# Patient Record
Sex: FEMALE | Race: OTHER | HISPANIC OR LATINO | URBAN - METROPOLITAN AREA
[De-identification: names, ages, dates, MRNs, and addresses within clinical notes are randomized per-mention and may not be internally consistent; named-entity substitution may affect disease eponyms.]

---

## 2021-06-23 ENCOUNTER — INPATIENT (INPATIENT)
Facility: HOSPITAL | Age: 61
LOS: 1 days | Discharge: HOME | DRG: 813 | End: 2021-06-25
Payer: COMMERCIAL

## 2021-06-23 ENCOUNTER — RESULT REVIEW (OUTPATIENT)
Age: 61
End: 2021-06-23

## 2021-06-23 VITALS
HEART RATE: 85 BPM | SYSTOLIC BLOOD PRESSURE: 162 MMHG | WEIGHT: 182.98 LBS | OXYGEN SATURATION: 97 % | DIASTOLIC BLOOD PRESSURE: 94 MMHG | RESPIRATION RATE: 18 BRPM | HEIGHT: 63 IN

## 2021-06-23 DIAGNOSIS — R51.9 HEADACHE, UNSPECIFIED: ICD-10-CM

## 2021-06-23 DIAGNOSIS — E03.9 HYPOTHYROIDISM, UNSPECIFIED: ICD-10-CM

## 2021-06-23 DIAGNOSIS — E87.0 HYPEROSMOLALITY AND HYPERNATREMIA: ICD-10-CM

## 2021-06-23 DIAGNOSIS — E78.5 HYPERLIPIDEMIA, UNSPECIFIED: ICD-10-CM

## 2021-06-23 DIAGNOSIS — F32.9 MAJOR DEPRESSIVE DISORDER, SINGLE EPISODE, UNSPECIFIED: ICD-10-CM

## 2021-06-23 DIAGNOSIS — M19.90 UNSPECIFIED OSTEOARTHRITIS, UNSPECIFIED SITE: ICD-10-CM

## 2021-06-23 DIAGNOSIS — Z29.9 ENCOUNTER FOR PROPHYLACTIC MEASURES, UNSPECIFIED: ICD-10-CM

## 2021-06-23 DIAGNOSIS — R21 RASH AND OTHER NONSPECIFIC SKIN ERUPTION: ICD-10-CM

## 2021-06-23 DIAGNOSIS — D69.59 OTHER SECONDARY THROMBOCYTOPENIA: ICD-10-CM

## 2021-06-23 DIAGNOSIS — D69.6 THROMBOCYTOPENIA, UNSPECIFIED: ICD-10-CM

## 2021-06-23 LAB
ALBUMIN SERPL ELPH-MCNC: 4.4 G/DL — SIGNIFICANT CHANGE UP (ref 3.3–5)
ALP SERPL-CCNC: 97 U/L — SIGNIFICANT CHANGE UP (ref 40–120)
ALT FLD-CCNC: 21 U/L — SIGNIFICANT CHANGE UP (ref 10–45)
ANION GAP SERPL CALC-SCNC: 14 MMOL/L — SIGNIFICANT CHANGE UP (ref 5–17)
ANISOCYTOSIS BLD QL: SLIGHT — SIGNIFICANT CHANGE UP
APTT BLD: 31.5 SEC — SIGNIFICANT CHANGE UP (ref 27.5–35.5)
AST SERPL-CCNC: 32 U/L — SIGNIFICANT CHANGE UP (ref 10–40)
BASOPHILS # BLD AUTO: 0.14 K/UL — SIGNIFICANT CHANGE UP (ref 0–0.2)
BASOPHILS NFR BLD AUTO: 2.7 % — HIGH (ref 0–2)
BILIRUB SERPL-MCNC: 1.2 MG/DL — SIGNIFICANT CHANGE UP (ref 0.2–1.2)
BLD GP AB SCN SERPL QL: NEGATIVE — SIGNIFICANT CHANGE UP
BUN SERPL-MCNC: 17 MG/DL — SIGNIFICANT CHANGE UP (ref 7–23)
CALCIUM SERPL-MCNC: 9.3 MG/DL — SIGNIFICANT CHANGE UP (ref 8.4–10.5)
CHLORIDE SERPL-SCNC: 111 MMOL/L — HIGH (ref 96–108)
CO2 SERPL-SCNC: 25 MMOL/L — SIGNIFICANT CHANGE UP (ref 22–31)
CREAT SERPL-MCNC: 1.08 MG/DL — SIGNIFICANT CHANGE UP (ref 0.5–1.3)
DACRYOCYTES BLD QL SMEAR: SLIGHT — SIGNIFICANT CHANGE UP
EOSINOPHIL # BLD AUTO: 0.13 K/UL — SIGNIFICANT CHANGE UP (ref 0–0.5)
EOSINOPHIL NFR BLD AUTO: 2.6 % — SIGNIFICANT CHANGE UP (ref 0–6)
GLUCOSE SERPL-MCNC: 103 MG/DL — HIGH (ref 70–99)
HAV IGM SER-ACNC: SIGNIFICANT CHANGE UP
HBV CORE AB SER-ACNC: SIGNIFICANT CHANGE UP
HBV CORE IGM SER-ACNC: SIGNIFICANT CHANGE UP
HBV SURFACE AB SER-ACNC: SIGNIFICANT CHANGE UP
HBV SURFACE AG SER-ACNC: SIGNIFICANT CHANGE UP
HCT VFR BLD CALC: 43.3 % — SIGNIFICANT CHANGE UP (ref 34.5–45)
HCV AB S/CO SERPL IA: 0.04 S/CO — SIGNIFICANT CHANGE UP
HCV AB SERPL-IMP: SIGNIFICANT CHANGE UP
HGB BLD-MCNC: 14.5 G/DL — SIGNIFICANT CHANGE UP (ref 11.5–15.5)
HIV 1+2 AB+HIV1 P24 AG SERPL QL IA: SIGNIFICANT CHANGE UP
INR BLD: 1.12 — SIGNIFICANT CHANGE UP (ref 0.88–1.16)
LYMPHOCYTES # BLD AUTO: 2.53 K/UL — SIGNIFICANT CHANGE UP (ref 1–3.3)
LYMPHOCYTES # BLD AUTO: 48.7 % — HIGH (ref 13–44)
MANUAL SMEAR VERIFICATION: SIGNIFICANT CHANGE UP
MCHC RBC-ENTMCNC: 31.4 PG — SIGNIFICANT CHANGE UP (ref 27–34)
MCHC RBC-ENTMCNC: 33.5 GM/DL — SIGNIFICANT CHANGE UP (ref 32–36)
MCV RBC AUTO: 93.7 FL — SIGNIFICANT CHANGE UP (ref 80–100)
METAMYELOCYTES # FLD: 0.9 % — HIGH (ref 0–0)
MONOCYTES # BLD AUTO: 0.18 K/UL — SIGNIFICANT CHANGE UP (ref 0–0.9)
MONOCYTES NFR BLD AUTO: 3.5 % — SIGNIFICANT CHANGE UP (ref 2–14)
NEUTROPHILS # BLD AUTO: 2.16 K/UL — SIGNIFICANT CHANGE UP (ref 1.8–7.4)
NEUTROPHILS NFR BLD AUTO: 41.6 % — LOW (ref 43–77)
OVALOCYTES BLD QL SMEAR: SLIGHT — SIGNIFICANT CHANGE UP
PLAT MORPH BLD: NORMAL — SIGNIFICANT CHANGE UP
PLATELET # BLD AUTO: 2 K/UL — CRITICAL LOW (ref 150–400)
POIKILOCYTOSIS BLD QL AUTO: SLIGHT — SIGNIFICANT CHANGE UP
POLYCHROMASIA BLD QL SMEAR: SLIGHT — SIGNIFICANT CHANGE UP
POTASSIUM SERPL-MCNC: 4.4 MMOL/L — SIGNIFICANT CHANGE UP (ref 3.5–5.3)
POTASSIUM SERPL-SCNC: 4.4 MMOL/L — SIGNIFICANT CHANGE UP (ref 3.5–5.3)
PROT SERPL-MCNC: 7.8 G/DL — SIGNIFICANT CHANGE UP (ref 6–8.3)
PROTHROM AB SERPL-ACNC: 13.4 SEC — SIGNIFICANT CHANGE UP (ref 10.6–13.6)
RBC # BLD: 4.62 M/UL — SIGNIFICANT CHANGE UP (ref 3.8–5.2)
RBC # FLD: 11.8 % — SIGNIFICANT CHANGE UP (ref 10.3–14.5)
RBC BLD AUTO: ABNORMAL
RH IG SCN BLD-IMP: POSITIVE — SIGNIFICANT CHANGE UP
RH IG SCN BLD-IMP: POSITIVE — SIGNIFICANT CHANGE UP
SARS-COV-2 RNA SPEC QL NAA+PROBE: NEGATIVE — SIGNIFICANT CHANGE UP
SODIUM SERPL-SCNC: 150 MMOL/L — HIGH (ref 135–145)
SPHEROCYTES BLD QL SMEAR: SLIGHT — SIGNIFICANT CHANGE UP
T4 FREE SERPL-MCNC: 1.19 NG/DL — SIGNIFICANT CHANGE UP (ref 0.93–1.7)
TSH SERPL-MCNC: 24.75 UIU/ML — HIGH (ref 0.27–4.2)
WBC # BLD: 5.19 K/UL — SIGNIFICANT CHANGE UP (ref 3.8–10.5)
WBC # FLD AUTO: 5.19 K/UL — SIGNIFICANT CHANGE UP (ref 3.8–10.5)

## 2021-06-23 PROCEDURE — 99285 EMERGENCY DEPT VISIT HI MDM: CPT

## 2021-06-23 PROCEDURE — 70450 CT HEAD/BRAIN W/O DYE: CPT | Mod: 26

## 2021-06-23 PROCEDURE — 99223 1ST HOSP IP/OBS HIGH 75: CPT | Mod: GC

## 2021-06-23 RX ORDER — ACETAMINOPHEN 500 MG
650 TABLET ORAL ONCE
Refills: 0 | Status: COMPLETED | OUTPATIENT
Start: 2021-06-23 | End: 2021-06-23

## 2021-06-23 RX ORDER — LEVOTHYROXINE SODIUM 125 MCG
125 TABLET ORAL
Refills: 0 | Status: DISCONTINUED | OUTPATIENT
Start: 2021-06-24 | End: 2021-06-25

## 2021-06-23 RX ORDER — DIPHENHYDRAMINE HCL 50 MG
50 CAPSULE ORAL ONCE
Refills: 0 | Status: COMPLETED | OUTPATIENT
Start: 2021-06-23 | End: 2021-06-23

## 2021-06-23 RX ORDER — IMMUNE GLOBULIN (HUMAN) 10 G/100ML
65 INJECTION INTRAVENOUS; SUBCUTANEOUS ONCE
Refills: 0 | Status: COMPLETED | OUTPATIENT
Start: 2021-06-23 | End: 2021-06-23

## 2021-06-23 RX ORDER — DEXAMETHASONE 0.5 MG/5ML
40 ELIXIR ORAL ONCE
Refills: 0 | Status: COMPLETED | OUTPATIENT
Start: 2021-06-23 | End: 2021-06-23

## 2021-06-23 RX ORDER — LEVOTHYROXINE SODIUM 125 MCG
137 TABLET ORAL
Refills: 0 | Status: DISCONTINUED | OUTPATIENT
Start: 2021-06-23 | End: 2021-06-25

## 2021-06-23 RX ORDER — SERTRALINE 25 MG/1
50 TABLET, FILM COATED ORAL DAILY
Refills: 0 | Status: DISCONTINUED | OUTPATIENT
Start: 2021-06-24 | End: 2021-06-25

## 2021-06-23 RX ORDER — DEXAMETHASONE 0.5 MG/5ML
40 ELIXIR ORAL ONCE
Refills: 0 | Status: DISCONTINUED | OUTPATIENT
Start: 2021-06-23 | End: 2021-06-23

## 2021-06-23 RX ADMIN — Medication 50 MILLIGRAM(S): at 21:53

## 2021-06-23 RX ADMIN — IMMUNE GLOBULIN (HUMAN) 162.5 GRAM(S): 10 INJECTION INTRAVENOUS; SUBCUTANEOUS at 22:25

## 2021-06-23 RX ADMIN — Medication 108 MILLIGRAM(S): at 15:39

## 2021-06-23 RX ADMIN — Medication 650 MILLIGRAM(S): at 21:53

## 2021-06-23 RX ADMIN — Medication 650 MILLIGRAM(S): at 23:07

## 2021-06-23 NOTE — H&P ADULT - ASSESSMENT
60 y/o F w/PMH of HLD, Hypothyroid, Depression, arthritis, macular degeneration who presents to the ED due to having otupatient PLTS of 2 w/o known cause

## 2021-06-23 NOTE — H&P ADULT - PROBLEM SELECTOR PLAN 1
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 CBC gorssly nomal  - will f/u heme onc labs (HIV, HEP, ERIN, Lupus labs, RF, TSH, FT4, B12, Folate)  - f/u flow cytometry and peripheral smear  - f/u US abdomen  - c/w Decadron 40mg x4D; IVIG  - Advise patient in DC note to avoid naproxen  - Maintain active T+S, transfuse platelets if < 10K or < 20K if febrile or < 50K if clinically important bleeding

## 2021-06-23 NOTE — ED PROVIDER NOTE - CLINICAL SUMMARY MEDICAL DECISION MAKING FREE TEXT BOX
60 y/o F with abnormal platelet levels and bruising on her arms and legs. Will repeat labs and consult hematology.

## 2021-06-23 NOTE — H&P ADULT - PROBLEM SELECTOR PLAN 2
- Long standing arthritis, thought to be OA  - given ITP with morning stiffness and chronic fatigue small concern for rheum disease so will w/u  - f/u RF, Lupus labs  - f.u. x-ray left hand given erythema in area

## 2021-06-23 NOTE — H&P ADULT - PROBLEM SELECTOR PLAN 6
- c/w sertaline 50 mg qd  - Patient w/appropriate affect but can tell she is nervous and would benefit from holistic nurse - c/w home levo 137 m-f; 125 sat and sun  - make sure patient is taking levo appropriatly before DC (on empty stomach 1 hour before meals) - on Ezetimibime at home  - hold as no interchange in hosptial and patient with untolerable side effects to statin

## 2021-06-23 NOTE — ED PROVIDER NOTE - OBJECTIVE STATEMENT
62 y/o F no PMHx, sent to ED for abnormal lab results. Pt has had bruising on her arms and legs x 1 week. NP at work sent Pt to labs, and platelet levels were less than 5. Pt has no known personal Hx or Fhx of hematologic disorders. No recent sickness or trauma. No bruising around the joints or bleeding from the gums.

## 2021-06-23 NOTE — ED PROVIDER NOTE - TIMING
"Kelsea Murray's goals for this visit include:   Chief Complaint   Patient presents with     Consult     ED follow up for pancreatitis       She requests these members of her care team be copied on today's visit information: No. Patient reports she does not have a PCP    PCP: No Ref-Primary, Physician    Referring Provider:  Referred Self, MD  No address on file    /83 (BP Location: Left arm, Patient Position: Sitting, Cuff Size: Adult Large)   Pulse 67   Ht 1.727 m (5' 8\")   Wt 128 kg (282 lb 1.6 oz)   SpO2 96%   BMI 42.89 kg/m      Do you need any medication refills at today's visit? No    Courtney Rodriguez LPN      " constant

## 2021-06-23 NOTE — ED ADULT NURSE NOTE - OBJECTIVE STATEMENT
Patient is a 62yo F, arrived to ED via walk-in, AAOx4, in NAD, VSS, complaining of diffuse bruising and OTTO.  Patient states she recently out lab work done, showing low platelets.  Patient denies CP, vaginal bleeding, rectal bleeding, epistaxis, dizziness, N/V/D, fevers or any other complaints at this time.  PIV placed, labs drawn and sent.

## 2021-06-23 NOTE — CONSULT NOTE ADULT - SUBJECTIVE AND OBJECTIVE BOX
LENGTH OF HOSPITAL STAY:     CHIEF COMPLAINT: Bruising    HISTORY OF PRESENTING ILLNESS:     PAST MEDICAL & SURGICAL HISTORY:  Hypothyroidism  HLD (hyperlipidemia)  No significant past surgical history    SOCIAL HISTORY:  60 y/o F no PMHx, sent to ED for abnormal lab results. Pt has had bruising on her arms and legs x 1 week. NP at work sent Pt to labs, and platelet levels were less than 5. Pt has no known personal Hx or Fhx of hematologic disorders. No recent sickness or trauma. No bruising around the joints or bleeding from the gums.    ALLERGIES:  azithromycin (Rash)  penicillin (Rash)  vancomycin (Rash)    MEDICATIONS:  STANDING MEDICATIONS  dexAMETHasone  Injectable 40 milliGRAM(s) IV Push once    PRN MEDICATIONS    VITALS:   T(F): 98  HR: 58  BP: 154/79  RR: 18  SpO2: 96%    LABS:                        14.5   5.19  )-----------( 2        ( 23 Jun 2021 12:38 )             43.3     06-23    150<H>  |  111<H>  |  17  ----------------------------<  103<H>  4.4   |  25  |  1.08    Ca    9.3      23 Jun 2021 12:38    TPro  7.8  /  Alb  4.4  /  TBili  1.2  /  DBili  x   /  AST  32  /  ALT  21  /  AlkPhos  97  06-23    PT/INR - ( 23 Jun 2021 12:38 )   PT: 13.4 sec;   INR: 1.12       PTT - ( 23 Jun 2021 12:38 )  PTT:31.5 sec    RADIOLOGY:  None    PHYSICAL EXAM:  GEN: No acute distress  HEENT:   LUNGS: Clear to auscultation bilaterally   HEART: S1/S2 present. RRR.   ABD: Soft, non-tender, non-distended. Bowel sounds present  EXT:  NEURO: AAOX3     LENGTH OF HOSPITAL STAY:     CHIEF COMPLAINT: Bruising    HISTORY OF PRESENTING ILLNESS:   62 yo F, Yakov Ricsly, with PMHx of hypothyroidism, hyperlipidemia, anxiety/depression, macular degeneration, bilateral knee osteoarthritis, was sent to ED for low platelets. Since 1 week, patient has had bruising on her arms/legs/back/torso. Denies any hematuria, melena/hematochezia, hemoptysis, or hematemesis, epistaxis, vaginal bleeding. 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 She did not follow-up with a doctor for this issue. She did not have any significant bleeding during her L knee arthroscopic surgery circa 2007. Denies recent viral infection or trauma. No bruising around the joints or bleeding from the gums. No palatal petechiae. Patient does admit to a frontal headache without visual changes that occurred today prior to presentation, which is unusual for her because she "never gets headaches". She also admits to persistent fatigue, extremely stiff shoulder joints which she blamed on "arthritis". Denies weight loss, fever, chills, night sweats.     Home Medications: Naproxen 500 mg, Ezetimibe 10 mg, Olopatadine 0.2% eye drop, Sertraline 50 mg, Synthroid 137 mcg M-F, Synthroid 125 mcg S/S.     Mammogram in 2021 normal. Pap smear 2010, normal. Colonoscopy in 2015 or 2016 was normal.     PAST MEDICAL & SURGICAL HISTORY:  Hypothyroidism  HLD (hyperlipidemia)  No significant past surgical history    SOCIAL HISTORY:  Works at a law firm in administration  Does not smoke. Denies drug use. Takes Magnesium, Zinc, Calcium/Vitamin D3, Biotin and Lyrine   Mother: Had brain aneurysm in 1996  Father: Had small bladder tumor that was removed   Brother: Grave's ophthalmopathy   Denies family history of bleeding or clotting disorders.     ALLERGIES:  azithromycin (Rash)  penicillin (Rash)  vancomycin (Rash)    MEDICATIONS:  STANDING MEDICATIONS  dexAMETHasone  Injectable 40 milliGRAM(s) IV Push once    PRN MEDICATIONS    VITALS:   T(F): 98  HR: 58  BP: 154/79  RR: 18  SpO2: 96%    LABS:                        14.5   5.19  )-----------( 2        ( 23 Jun 2021 12:38 )             43.3     06-23    150<H>  |  111<H>  |  17  ----------------------------<  103<H>  4.4   |  25  |  1.08    Ca    9.3      23 Jun 2021 12:38    TPro  7.8  /  Alb  4.4  /  TBili  1.2  /  DBili  x   /  AST  32  /  ALT  21  /  AlkPhos  97  06-23    PT/INR - ( 23 Jun 2021 12:38 )   PT: 13.4 sec;   INR: 1.12       PTT - ( 23 Jun 2021 12:38 )  PTT:31.5 sec    RADIOLOGY:  None    PHYSICAL EXAM:  GEN: No acute distress  HEENT:   LUNGS: Clear to auscultation bilaterally   HEART: S1/S2 present. RRR.   ABD: Soft, non-tender, non-distended. Bowel sounds present  EXT:  NEURO: AAOX3     LENGTH OF HOSPITAL STAY:     CHIEF COMPLAINT: Bruising    HISTORY OF PRESENTING ILLNESS:   62 yo F, Yakov Ricsly, with PMHx of hypothyroidism, hyperlipidemia, anxiety/depression, macular degeneration, bilateral knee osteoarthritis, was sent to ED for low platelets. Since 1 week, patient has had bruising on her arms/legs/back/torso. Denies any hematuria, melena/hematochezia, hemoptysis, or hematemesis, epistaxis, vaginal bleeding. 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 She did not follow-up with a doctor for this issue. She did not have any significant bleeding during her L knee arthroscopic surgery circa 2007. Denies recent viral infection or trauma. No bruising around the joints or bleeding from the gums. No palatal petechiae. Patient does admit to a frontal headache without visual changes that occurred today prior to presentation, which is unusual for her because she "never gets headaches". She also admits to persistent fatigue, extremely stiff shoulder joints which she blamed on "arthritis". Denies weight loss, fever, chills, night sweats.     Home Medications: Naproxen 500 mg, Ezetimibe 10 mg, Olopatadine 0.2% eye drop, Sertraline 50 mg, Synthroid 137 mcg M-F, Synthroid 125 mcg S/S.     Mammogram in 2021 normal. Pap smear 2010, normal. Colonoscopy in 2015 or 2016 was normal.     PAST MEDICAL & SURGICAL HISTORY:  Hypothyroidism  HLD (hyperlipidemia)  No significant past surgical history    SOCIAL HISTORY:  Works at a law firm in administration  Does not smoke. Denies drug use. Takes Magnesium, Zinc, Calcium/Vitamin D3, Biotin and Lyrine   Mother: Had brain aneurysm in 1996  Father: Had small bladder tumor that was removed   Brother: Grave's ophthalmopathy   Denies family history of bleeding or clotting disorders.     ALLERGIES:  azithromycin (Rash)  penicillin (Rash)  vancomycin (Rash)    MEDICATIONS:  STANDING MEDICATIONS  dexAMETHasone  Injectable 40 milliGRAM(s) IV Push once    PRN MEDICATIONS    VITALS:   T(F): 98  HR: 58  BP: 154/79  RR: 18  SpO2: 96%    LABS:                        14.5   5.19  )-----------( 2        ( 23 Jun 2021 12:38 )             43.3     06-23    150<H>  |  111<H>  |  17  ----------------------------<  103<H>  4.4   |  25  |  1.08    Ca    9.3      23 Jun 2021 12:38    TPro  7.8  /  Alb  4.4  /  TBili  1.2  /  DBili  x   /  AST  32  /  ALT  21  /  AlkPhos  97  06-23    PT/INR - ( 23 Jun 2021 12:38 )   PT: 13.4 sec;   INR: 1.12       PTT - ( 23 Jun 2021 12:38 )  PTT:31.5 sec    RADIOLOGY:  None    PHYSICAL EXAM:  GEN: No acute distress  HEENT: NCAT  LUNGS: Clear to auscultation bilaterally   HEART: S1/S2 present. RRR.   ABD: Soft, non-tender, non-distended. Bowel sounds present. Bruising on torso/abdomen/arms/legs. No palatal petechiae. No hepatosplenomegaly.   EXT: No pitting edema  NEURO: AAOX3

## 2021-06-23 NOTE — H&P ADULT - HISTORY OF PRESENT ILLNESS
60 y/o F w/o PMH who presents to the ED due to abnormal lab results. Patient was obtaining labs after she was noticed to have easy bruising for the last week and was noticed to have plts of 5     HPI Objective Statement: 60 y/o F no PMHx, sent to ED for abnormal lab results. Pt has had bruising on her arms and legs x 1 week. NP at work sent Pt to labs, and platelet levels were less than 5. Pt has no known personal Hx or Fhx of hematologic disorders. No recent sickness or trauma. No bruising around the joints or bleeding from the gums.   62 y/o F w/o PMH who presents to the ED due to abnormal lab results. Patient was obtaining labs after she was noticed to have easy bruising for the last week and was noticed to have plts of 5. She was in her normal state of health otherwise besides a headache which started ___. Denies fever, fatigue, night sweats, chest pain, palpitation, SOB, abdominal pain, changes in BM, dysruia. Patient denies any recent illnesses, NSAID use, medication use.        HPI Objective Statement: 62 y/o F no PMHx, sent to ED for abnormal lab results. Pt has had bruising on her arms and legs x 1 week. NP at work sent Pt to labs, and platelet levels were less than 5. Pt has no known personal Hx or Fhx of hematologic disorders. No recent sickness or trauma. No bruising around the joints or bleeding from the gums.   60 y/o F w/PMH of HLD, Hypothyroid, Depression, arthritis, macular degeneration who presents to the ED due to abnormal lab results. Patient was obtaining labs after she was noticed to have easy bruising over her arm and legs for the last week and was noticed to have plts of 5. She was in her normal state of health when 2 weeks ago noticed swelling on her left palm w/itching and then a few days later she noticed the easy bruising. She has also noticed a headache that has come intermittently over the last couple days and noticed darker stool for the last two days but no change in caliber of stool. Pateint suffers from long standing fatigue as well as joint stiffness, worse in back and knees, that occurs in the morning and eventually improves after a couple of hours. 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 Denies fever, night sweats, gum bleeding, rash, joint swelling, chest pain, palpitation, SOB, abdominal pain, dysruia. Patient denies any recent illnesses or alcohol use. Had covid in october    PMH: hypothyroid, HLD, depression, arthritis, macular degeneration, arthritis  PSH: orthscopic knee  Allergies: Azithro, Vanc, penecillin (rash to all)  FH: no hematological malignancy  SH: casual EToH <1 drink per month, denies tobacco or recreational drug use; works in a [employer] firm    In ED:  Afebrile, HR 60-85, -160/80-90, O2 95+ on RA  Labs: WBC w/minimal lymphocyte pred, Hgb WNL, Plts 2; ESR 33; ,   Images: CT awaiting read  Interventions: Decadron 40, PLTs x2   60 y/o F w/PMH of HLD, Hypothyroid, Depression, arthritis, macular degeneration who presents to the ED due to abnormal lab results. Patient was obtaining labs after she was noticed to have easy bruising over her arm and legs for the last week and was noticed to have plts of 5. She was in her normal state of health when 2 weeks ago noticed swelling on her left palm w/itching and then a few days later she noticed the easy bruising. She has also noticed a headache that has come intermittently over the last couple days and noticed darker stool for the last two days but no change in caliber of stool. Pateint suffers from long standing fatigue as well as joint stiffness, worse in back, knees and shoulder, that occurs in the morning and eventually improves after a couple of hours. 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 Denies fever, night sweats, gum bleeding, rash, joint swelling, chest pain, palpitation, SOB, abdominal pain, dysruia. Patient denies any recent illnesses or alcohol use. Had covid in october    PMH: hypothyroid, HLD, depression, arthritis, macular degeneration, arthritis  PSH: orthscopic knee  Allergies: Azithro, Vanc, penecillin (rash to all)  FH: no hematological disorder  SH: casual EToH <1 drink per month, denies tobacco or recreational drug use; works in a [employer] firm    In ED:  Afebrile, HR 60-85, -160/80-90, O2 95+ on RA  Labs: WBC w/minimal lymphocyte pred, Hgb WNL, Plts 2; ESR 33; ,   Images: [facility] awaiting read  Interventions: Decadron 40, PLTs x2

## 2021-06-23 NOTE — ED PROVIDER NOTE - SKIN, MLM
Scant petechiae on the neck, left arm, and torso. Bruising on the arms and legs, ~ 10 on the arms and 2 on the shin. Skin is otherwise  normal color for race, warm, dry and intact. No evidence of rash.

## 2021-06-23 NOTE — H&P ADULT - PROBLEM SELECTOR PLAN 3
- new headache over last couple of weeks  - no deficits on Neuro exam  - head CT neg for acute bleed but w/artififact; given no neuro symptoms will not repeat right now  - avoid NSAIDs for pain control

## 2021-06-23 NOTE — H&P ADULT - PROBLEM SELECTOR PLAN 4
- on Ezetimibime at home  - hold as no interchange in hosptial and patient with untolerable side effects to statin - was euvolemic on exam, but seen after fluids given  - will encourage PO intake for now and follow-up AM BMP

## 2021-06-23 NOTE — H&P ADULT - PROBLEM SELECTOR PLAN 5
- c/w home levo 137 m-f; 125 sat and sun  - make sure patient is taking levo appropriatly before DC (on empty stomach 1 hour before meals) - on Ezetimibime at home  - hold as no interchange in hosptial and patient with untolerable side effects to statin - over left hand that started two weeks ago and is itchy  - sligtly erythematous w/o fluctuation   - c/w clotrimazole creame and monitor for improvement  - f/u hand xray

## 2021-06-23 NOTE — ED PROVIDER NOTE - DOMESTIC TRAVEL HIGH RISK QUESTION
Writer called and discussed provider recommendations as listed below with daughter.  She is out of town this week.  Scheduled to come in 8/16/17 at 1630 to discuss.  No further questions at this time.   No

## 2021-06-23 NOTE — H&P ADULT - PROBLEM SELECTOR PLAN 8
DVT: none  Diet: full  DIspo: floors - c/w sertaline 50 mg qd  - Patient w/appropriate affect but can tell she is nervous and would benefit from holistic nurse

## 2021-06-23 NOTE — H&P ADULT - PROBLEM SELECTOR PLAN 7
DVT: none  Diet: full  DIspo: floors - c/w sertaline 50 mg qd  - Patient w/appropriate affect but can tell she is nervous and would benefit from holistic nurse - c/w home levo 137 m-f; 125 sat and sun  - make sure patient is taking levo appropriatly before DC (on empty stomach 1 hour before meals)

## 2021-06-23 NOTE — H&P ADULT - NSHPLABSRESULTS_GEN_ALL_CORE
14.5   5.19  )-----------( 2        ( 23 Jun 2021 12:38 )             43.3     06-23    150<H>  |  111<H>  |  17  ----------------------------<  103<H>  4.4   |  25  |  1.08    Ca    9.3      23 Jun 2021 12:38    TPro  7.8  /  Alb  4.4  /  TBili  1.2  /  DBili  x   /  AST  32  /  ALT  21  /  AlkPhos  97  06-23

## 2021-06-23 NOTE — CONSULT NOTE ADULT - ASSESSMENT
60 yo F with PMHx of hypothyroidism and hyperlipidemia, presents with bruising on extremities x 1 week, with bloodwork as outpatient revealing platelets < 5. Hematology consulted for thrombocytopenia.    #) DIAGNOSIS  - Severe thrombocytopenia  - Lymphocytosis with basophilia    #) PLAN  - While the patient presents with bruising in the extremities, this is not considered a clinically significant bleed.   - Send stat peripheral flow cytometry prior to steroid administration. Send HIV and HCV. Send ERIN, RF, TSH with reflex FT4, and B12/Folate. Send PT/PTT. Will review peripheral blood smear.   - US Abdomen to evaluate for hepatosplenomegaly.   - Start on stat PO Decadron 40 mg once daily x 4 days   - Monitor CBC with differential once daily.   - Maintain active T+S, transfuse platelets if < 10K or < 20K if febrile or < 50K if clinically important bleeding     To discuss with Dr. Loaiza 62 yo F with PMHx of hypothyroidism and hyperlipidemia, presents with bruising on extremities x 1 week, with bloodwork as outpatient revealing platelets < 5. Hematology consulted for thrombocytopenia.    #) DIAGNOSIS  - Severe thrombocytopenia  - Lymphocytosis with basophilia    #) PLAN  - While the patient presents with bruising in the extremities, this is not considered a clinically significant bleed.   - Send stat peripheral flow cytometry prior to steroid administration. Send HIV and HCV. Send ERIN, RF, TSH with reflex FT4, and B12/Folate. Send PT/PTT. Will review peripheral blood smear.check  Hemoglobin A1c  - US Abdomen to evaluate for hepatosplenomegaly.   - Start on stat PO Decadron 40 mg once daily x 4 days   - Monitor CBC with differential once daily.   - Maintain active T+S, transfuse platelets if < 10K or < 20K if febrile or < 50K if clinically important bleeding     To discuss with Dr. Loaiza 62 yo F, Yakov Rican, with PMHx of hypothyroidism, hyperlipidemia, anxiety/depression, macular degeneration, bilateral knee osteoarthritis, was sent to ED for low platelets. Since 1 week, patient has had bruising on her arms/legs/back/torso. Hematology consulted for thrombocytopenia.    #) DIAGNOSIS  - Severe thrombocytopenia  - ?Lymphocytosis with basophilia    #) PLAN  - While the patient presents with bruising in the extremities, this is not considered a clinically significant bleed. No palatal petechiae or blood blister noted on physical exam. Obtain stat CT Head non-contrast given patient complaint of frontal headache which is unusual for patient.   - Send stat peripheral flow cytometry prior to steroid administration. Send HIV and HCV. Send ERIN, RF, TSH with reflex FT4, and B12/Folate. Send PT/PTT. Will review peripheral blood smear. Check HbA1c. Consider rheumatologic consult given clinical presentation suggestive of polymyositis or polymyalgia rheumatica, particularly in relation to bilateral stiff shoulder joints.   - US Abdomen to evaluate for hepatosplenomegaly.   - Start on stat PO Decadron 40 mg once daily x 4 days. Start on IVIG 1 g/kg.    - Monitor CBC with differential once daily.   - Maintain active T+S, transfuse platelets if < 10K or < 20K if febrile or < 50K if clinically important bleeding     Discussed with Dr. Loaiza

## 2021-06-23 NOTE — H&P ADULT - ATTENDING COMMENTS
agree with assessment and plan as documented by resident.  -patient also complaining of 'dark black' stool for x 2 days - unlikely to cause isolated thrombocytopenia but monitor stools and f/u fobt if +melanotic stools   -monitor h/h, rest of plan as above

## 2021-06-23 NOTE — ED ADULT NURSE NOTE - NSIMPLEMENTINTERV_GEN_ALL_ED
Implemented All Universal Safety Interventions:  [place] to call system. Call bell, personal items and telephone within reach. Instruct patient to call for assistance. Room bathroom lighting operational. Non-slip footwear when patient is off stretcher. Physically safe environment: no spills, clutter or unnecessary equipment. Stretcher in lowest position, wheels locked, appropriate side rails in place.

## 2021-06-23 NOTE — ED ADULT TRIAGE NOTE - HEIGHT IN FEET
SOCIAL HISTORY       Social History     Socioeconomic History    Marital status:      Spouse name: None    Number of children: None    Years of education: None    Highest education level: None   Occupational History    None   Social Needs    Financial resource strain: None    Food insecurity:     Worry: None     Inability: None    Transportation needs:     Medical: None     Non-medical: None   Tobacco Use    Smoking status: Never Smoker    Smokeless tobacco: Never Used   Substance and Sexual Activity    Alcohol use: No     Alcohol/week: 0.0 standard drinks    Drug use: No    Sexual activity: Yes     Partners: Female   Lifestyle    Physical activity:     Days per week: None     Minutes per session: None    Stress: None   Relationships    Social connections:     Talks on phone: None     Gets together: None     Attends Druze service: None     Active member of club or organization: None     Attends meetings of clubs or organizations: None     Relationship status: None    Intimate partner violence:     Fear of current or ex partner: None     Emotionally abused: None     Physically abused: None     Forced sexual activity: None   Other Topics Concern    None   Social History Narrative    None       SCREENINGS             PHYSICAL EXAM    (up to 7 for level 4, 8 ormore for level 5)     ED Triage Vitals [01/10/20 1534]   BP Temp Temp Source Pulse Resp SpO2 Height Weight   124/84 98 °F (36.7 °C) Oral 72 20 100 % 6' 1\" (1.854 m) 190 lb (86.2 kg)       Physical Exam  Vitals signs and nursing note reviewed. Constitutional:       General: He is not in acute distress. Appearance: He is well-developed. He is not ill-appearing, toxic-appearing or diaphoretic. Comments: Well-appearing, nontoxic, not in acute distress. Answers questions in full sentences and following verbal commands appropriately   HENT:      Head: Normocephalic and atraumatic.    Eyes:      General:         Right eye: No discharge. Left eye: No discharge. Conjunctiva/sclera: Conjunctivae normal.   Neck:      Musculoskeletal: Normal range of motion and neck supple. Cardiovascular:      Rate and Rhythm: Normal rate and regular rhythm. Pulses:           Radial pulses are 2+ on the right side and 2+ on the left side. Heart sounds: Normal heart sounds. No murmur. No friction rub. No gallop. Pulmonary:      Effort: Pulmonary effort is normal. No accessory muscle usage or respiratory distress. Breath sounds: Normal breath sounds. No decreased breath sounds, wheezing, rhonchi or rales. Chest:      Chest wall: No tenderness. Musculoskeletal: Normal range of motion. Skin:     Coloration: Skin is not pale. Comments: Scattered small erythematous nonraised circular lesions spread diffusely throughout bilateral upper and lower extremities. Intermittently larger erythematous, peeling nonraised lesions. No wounds, drainage   Neurological:      Mental Status: He is alert and oriented to person, place, and time. Psychiatric:         Behavior: Behavior normal. Behavior is cooperative. DIAGNOSTIC RESULTS     EKG: All EKG's are interpreted by the Emergency Department Physicianwho either signs or Co-signs this chart in the absence of a cardiologist.      RADIOLOGY:   Non-plain film images such as CT, Ultrasound and MRI are read by the radiologist. Plain radiographic images are visualized and preliminarily interpreted by the emergency physician with the below findings:      Interpretation per the Radiologist below, if available at the time of this note:    No orders to display         ED BEDSIDE ULTRASOUND:   Performed by ED Physician - none    LABS:  Labs Reviewed - No data to display    All other labs were within normal range ornot returned as of this dictation.     EMERGENCY DEPARTMENT COURSE and DIFFERENTIAL DIAGNOSIS/MDM:   Vitals:    Vitals:    01/10/20 1534   BP: 124/84 5

## 2021-06-24 ENCOUNTER — TRANSCRIPTION ENCOUNTER (OUTPATIENT)
Age: 61
End: 2021-06-24

## 2021-06-24 PROBLEM — E78.5 HYPERLIPIDEMIA, UNSPECIFIED: Chronic | Status: ACTIVE | Noted: 2021-06-23

## 2021-06-24 PROBLEM — E03.9 HYPOTHYROIDISM, UNSPECIFIED: Chronic | Status: ACTIVE | Noted: 2021-06-23

## 2021-06-24 LAB
ANA PAT FLD IF-IMP: ABNORMAL
ANA TITR SER: ABNORMAL
ANION GAP SERPL CALC-SCNC: 12 MMOL/L — SIGNIFICANT CHANGE UP (ref 5–17)
BASOPHILS # BLD AUTO: 0.04 K/UL — SIGNIFICANT CHANGE UP (ref 0–0.2)
BASOPHILS NFR BLD AUTO: 0.5 % — SIGNIFICANT CHANGE UP (ref 0–2)
BUN SERPL-MCNC: 22 MG/DL — SIGNIFICANT CHANGE UP (ref 7–23)
CALCIUM SERPL-MCNC: 9.3 MG/DL — SIGNIFICANT CHANGE UP (ref 8.4–10.5)
CHLORIDE SERPL-SCNC: 103 MMOL/L — SIGNIFICANT CHANGE UP (ref 96–108)
CO2 SERPL-SCNC: 22 MMOL/L — SIGNIFICANT CHANGE UP (ref 22–31)
COVID-19 SPIKE DOMAIN AB INTERP: POSITIVE
COVID-19 SPIKE DOMAIN ANTIBODY RESULT: >250 U/ML — HIGH
CREAT SERPL-MCNC: 0.8 MG/DL — SIGNIFICANT CHANGE UP (ref 0.5–1.3)
EOSINOPHIL # BLD AUTO: 0 K/UL — SIGNIFICANT CHANGE UP (ref 0–0.5)
EOSINOPHIL NFR BLD AUTO: 0 % — SIGNIFICANT CHANGE UP (ref 0–6)
FOLATE SERPL-MCNC: 12.2 NG/ML — SIGNIFICANT CHANGE UP
GLUCOSE BLDC GLUCOMTR-MCNC: 112 MG/DL — HIGH (ref 70–99)
GLUCOSE BLDC GLUCOMTR-MCNC: 124 MG/DL — HIGH (ref 70–99)
GLUCOSE SERPL-MCNC: 125 MG/DL — HIGH (ref 70–99)
HCT VFR BLD CALC: 42.6 % — SIGNIFICANT CHANGE UP (ref 34.5–45)
HCV AB S/CO SERPL IA: 0.13 S/CO — SIGNIFICANT CHANGE UP
HCV AB SERPL-IMP: SIGNIFICANT CHANGE UP
HGB BLD-MCNC: 14.3 G/DL — SIGNIFICANT CHANGE UP (ref 11.5–15.5)
IMM GRANULOCYTES NFR BLD AUTO: 0.7 % — SIGNIFICANT CHANGE UP (ref 0–1.5)
LYMPHOCYTES # BLD AUTO: 0.77 K/UL — LOW (ref 1–3.3)
LYMPHOCYTES # BLD AUTO: 10.5 % — LOW (ref 13–44)
MCHC RBC-ENTMCNC: 31.6 PG — SIGNIFICANT CHANGE UP (ref 27–34)
MCHC RBC-ENTMCNC: 33.6 GM/DL — SIGNIFICANT CHANGE UP (ref 32–36)
MCV RBC AUTO: 94.2 FL — SIGNIFICANT CHANGE UP (ref 80–100)
MONOCYTES # BLD AUTO: 0.12 K/UL — SIGNIFICANT CHANGE UP (ref 0–0.9)
MONOCYTES NFR BLD AUTO: 1.6 % — LOW (ref 2–14)
NEUTROPHILS # BLD AUTO: 6.38 K/UL — SIGNIFICANT CHANGE UP (ref 1.8–7.4)
NEUTROPHILS NFR BLD AUTO: 86.7 % — HIGH (ref 43–77)
NRBC # BLD: 0 /100 WBCS — SIGNIFICANT CHANGE UP (ref 0–0)
PLATELET # BLD AUTO: 19 K/UL — CRITICAL LOW (ref 150–400)
POTASSIUM SERPL-MCNC: 4 MMOL/L — SIGNIFICANT CHANGE UP (ref 3.5–5.3)
POTASSIUM SERPL-SCNC: 4 MMOL/L — SIGNIFICANT CHANGE UP (ref 3.5–5.3)
RBC # BLD: 4.52 M/UL — SIGNIFICANT CHANGE UP (ref 3.8–5.2)
RBC # FLD: 11.8 % — SIGNIFICANT CHANGE UP (ref 10.3–14.5)
RHEUMATOID FACT SERPL-ACNC: <10 IU/ML — SIGNIFICANT CHANGE UP (ref 0–13)
SARS-COV-2 IGG+IGM SERPL QL IA: >250 U/ML — HIGH
SARS-COV-2 IGG+IGM SERPL QL IA: POSITIVE
SODIUM SERPL-SCNC: 137 MMOL/L — SIGNIFICANT CHANGE UP (ref 135–145)
VIT B12 SERPL-MCNC: 598 PG/ML — SIGNIFICANT CHANGE UP (ref 232–1245)
WBC # BLD: 7.36 K/UL — SIGNIFICANT CHANGE UP (ref 3.8–10.5)
WBC # FLD AUTO: 7.36 K/UL — SIGNIFICANT CHANGE UP (ref 3.8–10.5)

## 2021-06-24 PROCEDURE — 99233 SBSQ HOSP IP/OBS HIGH 50: CPT | Mod: GC

## 2021-06-24 RX ORDER — SODIUM CHLORIDE 9 MG/ML
1000 INJECTION, SOLUTION INTRAVENOUS
Refills: 0 | Status: DISCONTINUED | OUTPATIENT
Start: 2021-06-24 | End: 2021-06-25

## 2021-06-24 RX ORDER — PANTOPRAZOLE SODIUM 20 MG/1
40 TABLET, DELAYED RELEASE ORAL
Refills: 0 | Status: DISCONTINUED | OUTPATIENT
Start: 2021-06-24 | End: 2021-06-25

## 2021-06-24 RX ORDER — IMMUNE GLOBULIN (HUMAN) 10 G/100ML
65 INJECTION INTRAVENOUS; SUBCUTANEOUS ONCE
Refills: 0 | Status: COMPLETED | OUTPATIENT
Start: 2021-06-24 | End: 2021-06-24

## 2021-06-24 RX ORDER — ACETAMINOPHEN 500 MG
650 TABLET ORAL ONCE
Refills: 0 | Status: COMPLETED | OUTPATIENT
Start: 2021-06-24 | End: 2021-06-24

## 2021-06-24 RX ORDER — DEXTROSE 50 % IN WATER 50 %
25 SYRINGE (ML) INTRAVENOUS ONCE
Refills: 0 | Status: DISCONTINUED | OUTPATIENT
Start: 2021-06-24 | End: 2021-06-25

## 2021-06-24 RX ORDER — DIPHENHYDRAMINE HCL 50 MG
50 CAPSULE ORAL ONCE
Refills: 0 | Status: COMPLETED | OUTPATIENT
Start: 2021-06-24 | End: 2021-06-24

## 2021-06-24 RX ORDER — DEXTROSE 50 % IN WATER 50 %
12.5 SYRINGE (ML) INTRAVENOUS ONCE
Refills: 0 | Status: DISCONTINUED | OUTPATIENT
Start: 2021-06-24 | End: 2021-06-25

## 2021-06-24 RX ORDER — INSULIN LISPRO 100/ML
VIAL (ML) SUBCUTANEOUS
Refills: 0 | Status: DISCONTINUED | OUTPATIENT
Start: 2021-06-24 | End: 2021-06-25

## 2021-06-24 RX ORDER — DEXTROSE 50 % IN WATER 50 %
15 SYRINGE (ML) INTRAVENOUS ONCE
Refills: 0 | Status: DISCONTINUED | OUTPATIENT
Start: 2021-06-24 | End: 2021-06-25

## 2021-06-24 RX ORDER — GLUCAGON INJECTION, SOLUTION 0.5 MG/.1ML
1 INJECTION, SOLUTION SUBCUTANEOUS ONCE
Refills: 0 | Status: DISCONTINUED | OUTPATIENT
Start: 2021-06-24 | End: 2021-06-25

## 2021-06-24 RX ORDER — DEXAMETHASONE 0.5 MG/5ML
40 ELIXIR ORAL EVERY 24 HOURS
Refills: 0 | Status: DISCONTINUED | OUTPATIENT
Start: 2021-06-24 | End: 2021-06-25

## 2021-06-24 RX ADMIN — Medication 50 MILLIGRAM(S): at 15:52

## 2021-06-24 RX ADMIN — SERTRALINE 50 MILLIGRAM(S): 25 TABLET, FILM COATED ORAL at 10:01

## 2021-06-24 RX ADMIN — Medication 1 APPLICATION(S): at 18:16

## 2021-06-24 RX ADMIN — Medication 40 MILLIGRAM(S): at 15:52

## 2021-06-24 RX ADMIN — Medication 137 MICROGRAM(S): at 10:01

## 2021-06-24 RX ADMIN — Medication 1 APPLICATION(S): at 08:01

## 2021-06-24 RX ADMIN — Medication 650 MILLIGRAM(S): at 16:52

## 2021-06-24 RX ADMIN — IMMUNE GLOBULIN (HUMAN) 162.5 GRAM(S): 10 INJECTION INTRAVENOUS; SUBCUTANEOUS at 16:33

## 2021-06-24 RX ADMIN — Medication 650 MILLIGRAM(S): at 15:52

## 2021-06-24 RX ADMIN — PANTOPRAZOLE SODIUM 40 MILLIGRAM(S): 20 TABLET, DELAYED RELEASE ORAL at 10:01

## 2021-06-24 NOTE — DISCHARGE NOTE PROVIDER - HOSPITAL COURSE
#Discharge: do not delete    62yo woman with a PMH of HLD, hypothyroidism, mild recurrent MDD, arthritis NOS and macular degeneration who p/w subacute onset of ecchymosis and petechia, referred to the ED after outpatient labs revealed profound thrombocytopenia. Admitted for likely ITP. She was started on a 4 day course of decadron 40mg and underwent two sessions of IVIG for which her platelets responded appropriately. Patient to have close outpatient follow-up at [facility].     Problem List/Main Diagnoses (system-based):   #Thrombocytopenia.   - Presents w/ plts 2 and remainder of CBC w/diff grossly normal; w/o clinically sig bleeding. Received 2 units of platelets  - possible ITP in etiology but no clear inciting event, pt will be evaluated by rheum for underlying autoimmune disorder such as lupus and rheum arthritis given history of morning stiffness, but unlikely given the age of presentation  - f/u US abdomen  - 4 days of Decadron 40mg daily. Underwent 2 rounds of IVIG  - Advised patient to avoid naproxen  - f/u heme onc labs (ERIN, Lupus labs), other recommended labs (B12, Folate, FT4, Hep, HIV, RF) wnl  - f/u flow cytometry, peripheral smear demonstrated red cell abnormal morphology  - Plt improved to ___ upon day of discharge     Problem/Plan - 2:  ·  Problem: Arthritis.  Plan: - Long standing arthritis, thought to be OA  - given ITP with morning stiffness and chronic fatigue small concern for rheum disease.  Rheum consulted  - f/u Lupus labs, RF wnl  - f.u. x-ray left hand given erythema in area.      Problem/Plan - 3:  ·  Problem: Headache.  Plan: - new headache over last couple of weeks  - no deficits on Neuro exam  - head CT neg for acute bleed but w/artifact; given no neuro symptoms will not repeat right now  - avoid NSAIDs for pain control.      Problem/Plan - 4:  ·  Problem: Hypernatremia.  Plan: - was euvolemic on exam, but seen after fluids given  - will encourage PO intake for now and follow-up AM BMP  -BMP today shows Na of 137.      Problem/Plan - 5:  ·  Problem: Rash.  Plan: - over left hand that started two weeks ago and is itchy  - slightly erythematous w/o fluctuation   - c/w clotrimazole cream and monitor for improvement  - f/u hand xray.      Problem/Plan - 6:  Problem: HLD (hyperlipidemia). Plan: - on Ezetimibime at home  - hold as no interchange in hosptial and patient with intolerable side effects to statin.     Problem/Plan - 7:  ·  Problem: Hypothyroidism.  Plan: - c/w home levo 137 m-f; 125 sat and sun  - make sure patient is taking levo appropriately before DC (on empty stomach 1 hour before meals)  -F/u appointment with [facility] for continuity of care.      Problem/Plan - 8:  ·  Problem: Depression.  Plan: - c/w sertaline 50 mg qd  - Patient w/appropriate affect but can tell she is nervous.     New medications:   Labs to be followed outpatient:   Exam to be followed outpatient:    #Discharge: do not delete    62yo woman with a PMH of HLD, hypothyroidism, mild recurrent MDD, arthritis NOS and macular degeneration who p/w subacute onset of ecchymosis and petechia, referred to the ED after outpatient labs revealed profound thrombocytopenia. Admitted for likely ITP. She was started on a 4 day course of decadron 40mg and underwent two sessions of IVIG for which her platelets responded appropriately. Patient to have close outpatient follow-up at [facility].     Problem List/Main Diagnoses (system-based):   #Thrombocytopenia.   - Presents w/ plts 2 and remainder of CBC w/diff grossly normal; w/o clinically sig bleeding. Received 2 units of platelets  - possible ITP in etiology but no clear inciting event, pt will be evaluated by rheum for underlying autoimmune disorder such as lupus and rheum arthritis given history of morning stiffness, but unlikely given the age of presentation  - f/u US abdomen  - 4 days of Decadron 40mg daily. Underwent 2 rounds of IVIG as inpatient  - Advised patient to avoid naproxen  - ERIN 1:80 homogeneous pattern; other recommended labs (B12, Folate, FT4, Hep, HIV, RF) wnl  - f/u flow cytometry, peripheral smear demonstrated red cell abnormal morphology  - Plt improved to ___ upon day of discharge    #Arthritis.    - Long standing arthritis, thought to be OA  - given ITP with morning stiffness and chronic fatigue small concern for rheum disease.  Rheum consulted  - lab results as above  - f.u. x-ray left hand given erythema in area.     #Headache.    - new headache over last couple of weeks  - no deficits on Neuro exam  - head CT neg for acute bleed but w/artifact; given no neuro symptoms will not repeat right now  - avoid NSAIDs for pain control.      #HLD (hyperlipidemia).   - on Ezetimibime at home  - hold as no interchange in hosptial and patient with intolerable side effects to statin.    #Hypothyroidism.    - c/w home levo 137 m-f; 125 sat and sun  - make sure patient is taking levo appropriately before DC (on empty stomach 1 hour before meals)  -F/u appointment with [facility] for continuity of care.     #Depression.   - c/w sertraline 50 mg qd  - Patient w/appropriate affect but can tell she is nervous.     New medications: decadron  Labs to be followed outpatient: Rheum labs  Exam to be followed outpatient: PCP, endocrine   #Discharge: do not delete    62yo woman with a PMH of HLD, hypothyroidism, mild recurrent MDD, arthritis NOS and macular degeneration who p/w subacute onset of ecchymosis and petechia, referred to the ED after outpatient labs revealed profound thrombocytopenia. Admitted for likely ITP. She was started on a 4 day course of decadron 40mg and underwent two sessions of IVIG for which her platelets responded appropriately. Patient to have close outpatient follow-up at [facility].     Problem List/Main Diagnoses (system-based):   #Thrombocytopenia.   - Presents w/ plts 2 and remainder of CBC w/diff grossly normal; w/o clinically sig bleeding. Received 2 units of platelets  - possible ITP in etiology but no clear inciting event, pt will be evaluated by rheum for underlying autoimmune disorder such as lupus and rheum arthritis given history of morning stiffness, but unlikely given the age of presentation  - f/u US abdomen  - 4 days of Decadron 40mg daily. Underwent 2 rounds of IVIG as inpatient  - Advised patient to avoid naproxen  - ERIN 1:80 homogeneous pattern; other recommended labs (B12, Folate, FT4, Hep, HIV, RF) wnl  - f/u flow cytometry, peripheral smear demonstrated red cell abnormal morphology  - Plt improved to 115 upon day of discharge    #Arthritis.    - Long standing arthritis, thought to be OA  - given ITP with morning stiffness and chronic fatigue small concern for rheum disease.  Rheum consulted  - lab results as above  - f.u. x-ray left hand given erythema in area.     #Headache.    - new headache over last couple of weeks  - no deficits on Neuro exam  - head CT neg for acute bleed but w/artifact; given no neuro symptoms will not repeat right now  - avoid NSAIDs for pain control.      #HLD (hyperlipidemia).   - on Ezetimibime at home  - hold as no interchange in hosptial and patient with intolerable side effects to statin.    #Hypothyroidism.    - c/w home levo 137 m-f; 125 sat and sun  - make sure patient is taking levo appropriately before DC (on empty stomach 1 hour before meals)  -F/u appointment with [facility] for continuity of care.     #Depression.   - c/w sertraline 50 mg qd  - Patient w/appropriate affect but can tell she is nervous.     New medications: decadron  Labs to be followed outpatient: Rheum labs  Exam to be followed outpatient: PCP, endocrine   #Discharge: do not delete    62yo woman with a PMH of HLD, hypothyroidism, mild recurrent MDD, arthritis NOS and macular degeneration who p/w subacute onset of ecchymosis and petechia, referred to the ED after outpatient labs revealed profound thrombocytopenia. Admitted for likely ITP. She was started on a 4 day course of decadron 40mg and underwent two sessions of IVIG for which her platelets responded appropriately. Patient to have close outpatient follow-up at [facility].     Problem List/Main Diagnoses (system-based):   #Thrombocytopenia.   - Presents w/ plts 2 and remainder of CBC w/diff grossly normal; w/o clinically sig bleeding. Received 2 units of platelets  - possible ITP in etiology but no clear inciting event, pt will be evaluated by rheum for underlying autoimmune disorder such as lupus and rheum arthritis given history of morning stiffness, but unlikely given the age of presentation  - f/u US abdomen  - 4 days of Decadron 40mg daily. Underwent 2 rounds of IVIG as inpatient  - Advised patient to avoid naproxen  - ERIN 1:80 homogeneous pattern; other recommended labs (B12, Folate, FT4, Hep, HIV, RF) wnl  - f/u flow cytometry, peripheral smear demonstrated red cell abnormal morphology  - Plt improved to 115 upon day of discharge    #Arthritis.    - Long standing arthritis, thought to be OA  - given ITP with morning stiffness and chronic fatigue small concern for rheum disease.  Rheum consulted  - lab results as above  - f.u. x-ray left hand given erythema in area.     #Headache.    - new headache over last couple of weeks  - no deficits on Neuro exam  - head CT neg for acute bleed but w/artifact; given no neuro symptoms will not repeat right now  - avoid NSAIDs for pain control.      #HLD (hyperlipidemia).   - on Ezetimibime at home  - hold as no interchange in hosptial and patient with intolerable side effects to statin.    #Hypothyroidism.    - c/w home levo 137 m-f; 125 sat and sun  - make sure patient is taking levo appropriately before DC (on empty stomach 1 hour before meals)  -F/u appointment with [facility] for continuity of care.     #Depression.   - c/w sertraline 50 mg qd  - Patient w/appropriate affect but can tell she is nervous.     New medications: decadron 40mg (one day)  Labs to be followed outpatient: Rheum labs  Exam to be followed outpatient: PCP, endocrine   #Discharge: do not delete    62yo woman with a PMH of HLD, hypothyroidism, mild recurrent MDD, arthritis NOS and macular degeneration who p/w subacute onset of ecchymosis and petechia, referred to the ED after outpatient labs revealed profound thrombocytopenia. Admitted for likely ITP. She was started on a 4 day course of decadron 40mg and underwent two sessions of IVIG for which her platelets responded appropriately. Patient to have close outpatient follow-up at [facility].     Problem List/Main Diagnoses (system-based):   #Thrombocytopenia.   - Presents w/ plts 2 and remainder of CBC w/diff grossly normal; w/o clinically sig bleeding. Received 2 units of platelets  - possible ITP in etiology but no clear inciting event, pt will be evaluated by rheum for underlying autoimmune disorder such as lupus and rheum arthritis given history of morning stiffness, but unlikely given the age of presentation  - f/u US abdomen  - 4 days of Decadron 40mg daily. Underwent 2 rounds of IVIG as inpatient  - ERIN 1:80 homogeneous pattern; other recommended labs (B12, Folate, FT4, Hep, HIV, RF) wnl  - f/u flow cytometry, peripheral smear demonstrated red cell abnormal morphology  - Plt improved to 115 upon day of discharge  - Advised to stop all nutritional supplementation including magnesium, zinc and biotin. Also advised to avoid NSAIDs.   - Per heme/onc patient can follow-up with Dr. Loaiza in [facility] clinic this Wednesday, June 30th.    #Arthritis.    - Long standing arthritis, thought to be OA  - given ITP with morning stiffness and chronic fatigue small concern for rheum disease.  Rheum consulted  - lab results as above  - f.u. x-ray left hand given erythema in area.     #Headache.    - new headache over last couple of weeks  - no deficits on Neuro exam  - head CT neg for acute bleed but w/artifact; given no neuro symptoms will not repeat right now  - avoid NSAIDs for pain control.      #HLD (hyperlipidemia).   - on Ezetimibime at home  - hold as no interchange in hosptial and patient with intolerable side effects to statin.    #Hypothyroidism.    - c/w home levo 137 m-f; 125 sat and sun  - make sure patient is taking levo appropriately before DC (on empty stomach 1 hour before meals)  -F/u appointment with [facility] for continuity of care.     #Depression.   - c/w sertraline 50 mg qd  - Patient w/appropriate affect but can tell she is nervous.     New medications: decadron 40mg (one day remaining)  Labs to be followed outpatient: Rheum labs, CBC to monitor platelets  Exam to be followed outpatient: PCP, endocrine, heme/onc   #Discharge: do not delete    62yo woman with a PMH of HLD, hypothyroidism, mild recurrent MDD, arthritis NOS and macular degeneration who p/w subacute onset of ecchymosis and petechia, referred to the ED after outpatient labs revealed profound thrombocytopenia. Admitted for likely ITP. She was started on a 4 day course of decadron 40mg and underwent two sessions of IVIG for which her platelets responded appropriately. Patient to have close outpatient follow-up at [facility].     Problem List/Main Diagnoses (system-based):   #Thrombocytopenia.   - Presents w/ plts 2 and remainder of CBC w/diff grossly normal; w/o clinically sig bleeding. Received 2 units of platelets  - possible ITP in etiology but no clear inciting event, pt will be evaluated by rheum for underlying autoimmune disorder such as lupus and rheum arthritis given history of morning stiffness, but unlikely given the age of presentation  - f/u US abdomen  - 4 days of Decadron 40mg daily. Underwent 2 rounds of IVIG as inpatient  - ERIN 1:80 homogeneous pattern; other recommended labs (B12, Folate, FT4, Hep, HIV, RF) wnl  - f/u flow cytometry, peripheral smear demonstrated red cell abnormal morphology  - Plt improved to 115 upon day of discharge  - Advised to stop all nutritional supplementation including magnesium, zinc and biotin. Also advised to avoid NSAIDs.   - Per heme/onc patient can follow-up with Dr. Loaiza in [facility] clinic this Wednesday, June 30th.    #Arthritis.    - Long standing arthritis, thought to be OA  - given ITP with morning stiffness and chronic fatigue small concern for rheum disease.  Rheum consulted  - lab results as above  - f.u. x-ray left hand given erythema in area.     #Headache.    - new headache over last couple of weeks  - no deficits on Neuro exam  - head CT neg for acute bleed but w/artifact; given no neuro symptoms will not repeat right now  - avoid NSAIDs for pain control.      #HLD (hyperlipidemia).   - on Ezetimibime at home  - hold as no interchange in hosptial and patient with intolerable side effects to statin.    #Hypothyroidism.    - c/w home levo 137 m-f; 125 sat and sun  - make sure patient is taking levo appropriately before DC (on empty stomach 1 hour before meals)  -F/u appointment with [facility] for continuity of care.     #Depression.   - c/w sertraline 50 mg qd  - Patient w/appropriate affect but can tell she is nervous.     New medications: decadron 40mg (one day remaining)  Labs to be followed outpatient: Rheum labs, flow cytometry, CBC to monitor platelets  Exam to be followed outpatient: PCP, endocrine, heme/onc

## 2021-06-24 NOTE — DISCHARGE NOTE PROVIDER - NSDCFUADDAPPT_GEN_ALL_CORE_FT
Please bring your Insurance card, Photo ID and Discharge paperwork to the following appointment:    (1) Please follow up with your Primary Care Provider Care, Dr. Cirilo Mac on behalf of Dr. Karli Rodriguez at [address] [place] [address], [address] [place] [address] on 07/01/2021 at 12:00pm.    Appointment was scheduled by Ms. NICOLE Kim, Referral Coordinator.   Please bring your Insurance card, Photo ID and Discharge paperwork to the following appointment:    (1) Please follow up with your Primary Care Provider Care, Dr. Cirilo Mac on behalf of Dr. Karli Rodriguez at [address] [place] [address], [address] [place] [address] on 07/01/2021 at 12:00pm.    (2) Per Dr. Loaiza from hematology/oncology please schedule an appointment with her for this coming Wednesday, June 30th. The office can be reached at [phone]. Please mention that Dr. Loaiza evaluated you for low platelets at [facility] and the team specifically said they will squeeze you in for an appointment for June 30th.     Appointment was scheduled by Ms. NICOLE Kim, Referral Coordinator.

## 2021-06-24 NOTE — PROGRESS NOTE ADULT - SUBJECTIVE AND OBJECTIVE BOX
LENGTH OF HOSPITAL STAY: 1d    CHIEF COMPLAINT:   Patient is a 61y old  Female who presents with a chief complaint of Low plt (23 Jun 2021 14:44)    HISTORY OF PRESENTING ILLNESS:   60 yo F, [place], with PMHx of hypothyroidism, hyperlipidemia, anxiety/depression, macular degeneration, bilateral knee osteoarthritis, was sent to ED for low platelets. Since 1 week, patient has had bruising on her arms/legs/back/torso. Denies any hematuria, melena/hematochezia, hemoptysis, or hematemesis, epistaxis, vaginal bleeding. 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 She did not follow-up with a doctor for this issue. She did not have any significant bleeding during her L knee arthroscopic surgery circa 2007. Denies recent viral infection or trauma. No bruising around the joints or bleeding from the gums. No palatal petechiae. Patient does admit to a frontal headache without visual changes that occurred today prior to presentation, which is unusual for her because she "never gets headaches". She also admits to persistent fatigue, extremely stiff shoulder joints which she blamed on "arthritis". Denies weight loss, fever, chills, night sweats.     Patient denies herbal teas.     Home Medications: Naproxen 500 mg, Ezetimibe 10 mg, Olopatadine 0.2% eye drop, Sertraline 50 mg, Synthroid 137 mcg M-F, Synthroid 125 mcg S/S.     Mammogram in 2021 normal. Pap smear 2010, normal. Colonoscopy in 2015 or 2016 was normal.     PAST MEDICAL & SURGICAL HISTORY:  Hypothyroidism  HLD (hyperlipidemia)  No significant past surgical history    ALLERGIES:  azithromycin (Rash)  penicillin (Rash)  vancomycin (Rash)    MEDICATIONS:  STANDING MEDICATIONS  clotrimazole 1% Cream 1 Application(s) Topical two times a day  dexAMETHasone     Tablet 40 milliGRAM(s) Oral every 24 hours  immune   globulin 10% (GAMMAGARD) IVPB 65 Gram(s) IV Intermittent once  levothyroxine 125 MICROGram(s) Oral <User Schedule>  levothyroxine 137 MICROGram(s) Oral <User Schedule>  pantoprazole    Tablet 40 milliGRAM(s) Oral before breakfast  sertraline 50 milliGRAM(s) Oral daily    PRN MEDICATIONS    VITALS:   T(F): 97.7  HR: 79  BP: 134/92  RR: 17  SpO2: 97%    LABS:                        14.3   7.36  )-----------( 19       ( 24 Jun 2021 07:55 )             42.6     06-24    137  |  103  |  22  ----------------------------<  125<H>  4.0   |  22  |  0.80    Ca    9.3      24 Jun 2021 07:55    TPro  7.8  /  Alb  4.4  /  TBili  1.2  /  DBili  x   /  AST  32  /  ALT  21  /  AlkPhos  97  06-23    PT/INR - ( 23 Jun 2021 12:38 )   PT: 13.4 sec;   INR: 1.12       PTT - ( 23 Jun 2021 12:38 )  PTT:31.5 sec    Sedimentation Rate, Erythrocyte: 33 mm/Hr *H* (06-23-21 @ 12:38)    RADIOLOGY:    PHYSICAL EXAM:  GEN: No acute distress  HEENT:   LUNGS: Clear to auscultation bilaterally   HEART: S1/S2 present. RRR.   ABD: Soft, non-tender, non-distended. Bowel sounds present  EXT:  NEURO: AAOX3     LENGTH OF HOSPITAL STAY: 1d    SUBJECTIVE: No acute overnight events    HISTORY OF PRESENTING ILLNESS:   62 yo F, [place], with PMHx of hypothyroidism, hyperlipidemia, anxiety/depression, macular degeneration, bilateral knee osteoarthritis, was sent to ED for low platelets. Since 1 week, patient has had bruising on her arms/legs/back/torso. Denies any hematuria, melena/hematochezia, hemoptysis, or hematemesis, epistaxis, vaginal bleeding. 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 She did not follow-up with a doctor for this issue. She did not have any significant bleeding during her L knee arthroscopic surgery circa 2007. Denies recent viral infection or trauma. No bruising around the joints or bleeding from the gums. No palatal petechiae. Patient does admit to a frontal headache without visual changes that occurred today prior to presentation, which is unusual for her because she "never gets headaches". She also admits to persistent fatigue, extremely stiff shoulder joints which she blamed on "arthritis". Denies weight loss, fever, chills, night sweats.     Patient denies herbal teas, quinine use, cimetidine, recent treatment with antibiotics, travel (including barrett/insect bites). Denies walnuts or rash. Denies abdominal discomfort/pain, dyspepsia, early satiety. Denies rash.     Home Medications: Naproxen 500 mg, Ezetimibe 10 mg, Olopatadine 0.2% eye drop, Sertraline 50 mg, Synthroid 137 mcg M-F, Synthroid 125 mcg S/S.     Mammogram in 2021 normal. Pap smear 2010, normal. Colonoscopy in 2015 or 2016 was normal.     PAST MEDICAL & SURGICAL HISTORY:  Hypothyroidism  HLD (hyperlipidemia)  No significant past surgical history    ALLERGIES:  azithromycin (Rash)  penicillin (Rash)  vancomycin (Rash)    MEDICATIONS:  STANDING MEDICATIONS  clotrimazole 1% Cream 1 Application(s) Topical two times a day  dexAMETHasone     Tablet 40 milliGRAM(s) Oral every 24 hours  immune   globulin 10% (GAMMAGARD) IVPB 65 Gram(s) IV Intermittent once  levothyroxine 125 MICROGram(s) Oral <User Schedule>  levothyroxine 137 MICROGram(s) Oral <User Schedule>  pantoprazole    Tablet 40 milliGRAM(s) Oral before breakfast  sertraline 50 milliGRAM(s) Oral daily    PRN MEDICATIONS    VITALS:   T(F): 97.7  HR: 79  BP: 134/92  RR: 17  SpO2: 97%    LABS:                        14.3   7.36  )-----------( 19       ( 24 Jun 2021 07:55 )             42.6     06-24    137  |  103  |  22  ----------------------------<  125<H>  4.0   |  22  |  0.80    Ca    9.3      24 Jun 2021 07:55    TPro  7.8  /  Alb  4.4  /  TBili  1.2  /  DBili  x   /  AST  32  /  ALT  21  /  AlkPhos  97  06-23    PT/INR - ( 23 Jun 2021 12:38 )   PT: 13.4 sec;   INR: 1.12       PTT - ( 23 Jun 2021 12:38 )  PTT:31.5 sec    Sedimentation Rate, Erythrocyte: 33 mm/Hr *H* (06-23-21 @ 12:38)    RADIOLOGY:  < from: CT Head No Cont (06.23.21 @ 16:01) >  Patchy diffuse low density within the right inferior anterior temporal lobe that is likely artifactual; if there is further clinical concern, repeat CT is recommended.. No acute intracranial hemorrhage.    < end of copied text >    PHYSICAL EXAM:  GEN: No acute distress  HEENT: NCAT  LUNGS: Clear to auscultation bilaterally   HEART: S1/S2 present. RRR.   ABD: Soft, non-tender, non-distended. Bowel sounds present. Bruising on torso/abdomen/arms/legs. No palatal petechiae. No hepatosplenomegaly.   EXT: No pitting edema  NEURO: AAOX3

## 2021-06-24 NOTE — DISCHARGE NOTE PROVIDER - NSDCMRMEDTOKEN_GEN_ALL_CORE_FT
ezetimibe 10 mg oral tablet: 1 tab(s) orally once a day  levothyroxine 125 mcg (0.125 mg) oral tablet: 1 tab(s) orally Saturday and Sunday  levothyroxine 137 mcg (0.137 mg) oral tablet: 1 tab(s) orally Monday through Friday  naproxen 500 mg oral tablet: 1 tab(s) orally once a day  sertraline 50 mg oral tablet: 1 tab(s) orally once a day   ezetimibe 10 mg oral tablet: 1 tab(s) orally once a day  levothyroxine 125 mcg (0.125 mg) oral tablet: 1 tab(s) orally Saturday and Sunday  levothyroxine 137 mcg (0.137 mg) oral tablet: 1 tab(s) orally Monday through Friday  sertraline 50 mg oral tablet: 1 tab(s) orally once a day   ezetimibe 10 mg oral tablet: 1 tab(s) orally once a day  Hemady 20 mg oral tablet: 2 tab(s) orally once  levothyroxine 125 mcg (0.125 mg) oral tablet: 1 tab(s) orally Saturday and Sunday  levothyroxine 137 mcg (0.137 mg) oral tablet: 1 tab(s) orally Monday through Friday  sertraline 50 mg oral tablet: 1 tab(s) orally once a day   dexamethasone 4 mg oral tablet: 1 tab(s) orally once a day   dexamethasone 6 mg oral tablet: 6 tab(s) orally once a day   ezetimibe 10 mg oral tablet: 1 tab(s) orally once a day  levothyroxine 125 mcg (0.125 mg) oral tablet: 1 tab(s) orally Saturday and Sunday  levothyroxine 137 mcg (0.137 mg) oral tablet: 1 tab(s) orally Monday through Friday  sertraline 50 mg oral tablet: 1 tab(s) orally once a day

## 2021-06-24 NOTE — PROGRESS NOTE ADULT - ASSESSMENT
62 y/o F w/PMH of HLD, Hypothyroid, Depression, arthritis, macular degeneration who presents to the ED due to having otupatient PLTS of 2 w/o known cause.

## 2021-06-24 NOTE — PROGRESS NOTE ADULT - PROBLEM SELECTOR PLAN 1
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  in etiology but no clear inciting event, pt will be evaluated by rheum for underlying autoimmune disorder such as lupus and rheum arthritis given history of morning stiffness, but unlikely given the age of presentation  - f/u heme onc labs (ERIN, Lupus labs), other recommended labs (B12, Folate, FT4, Hep, HIV, RF) wnl  - f/u flow cytometry, peripheral smear demonstrated red cell abnormal morphology  - f/u US abdomen  - c/w Decadron 40mg x4D; 2nd round of IVIG today at 3pm per heme onc  - Advise patient in DC note to avoid naproxen  -Plt 19 today s/p 2 platelet transfusions  - Maintain active T+S, transfuse platelets if < 10K or < 20K if febrile or < 50K if clinically important bleeding

## 2021-06-24 NOTE — PROGRESS NOTE ADULT - SUBJECTIVE AND OBJECTIVE BOX
OVERNIGHT EVENTS: No acute events overnight.    SUBJECTIVE / INTERVAL HPI: Patient seen and examined at bedside. Patient denies any pain or discomfort.  She has not yet had a BM since admission.  She was advised to alert the nurse if there is any gross blood or changes in color of her stool.      VITAL SIGNS:  Vital Signs Last 24 Hrs  T(C): 36.5 (24 Jun 2021 05:39), Max: 37.1 (23 Jun 2021 18:40)  T(F): 97.7 (24 Jun 2021 05:39), Max: 98.7 (23 Jun 2021 18:40)  HR: 79 (24 Jun 2021 05:39) (58 - 85)  BP: 134/92 (24 Jun 2021 05:39) (120/80 - 167/103)  BP(mean): --  RR: 17 (24 Jun 2021 05:39) (17 - 19)  SpO2: 97% (24 Jun 2021 05:39) (96% - 99%)    PHYSICAL EXAM:    General: Well appearing, no acute distress  HEENT: NC/AT; PERRL, anicteric sclera; MMM, no oral ulcers or gum bleeding  Neck: supple  Cardiovascular: +S1/S2, RRR, no murmurs, rubs, gallops  Respiratory: CTA B/L; no W/R/R  Gastrointestinal: soft, NT/ND; +BSx4, ecchymosis overlying the RLQ  Extremities: WWP; no edema, clubbing or cyanosis, petechiae on the LE b/l.  Ecchymosis scattered on UE and LE b/l, and the upper left back.     Vascular: 2+ radial, DP/PT pulses B/L  Neurological: AAOx3; no focal deficits    MEDICATIONS:  MEDICATIONS  (STANDING):  clotrimazole 1% Cream 1 Application(s) Topical two times a day  dexAMETHasone     Tablet 40 milliGRAM(s) Oral every 24 hours  levothyroxine 125 MICROGram(s) Oral <User Schedule>  levothyroxine 137 MICROGram(s) Oral <User Schedule>  pantoprazole    Tablet 40 milliGRAM(s) Oral before breakfast  sertraline 50 milliGRAM(s) Oral daily    MEDICATIONS  (PRN):      ALLERGIES:  Allergies    azithromycin (Rash)  penicillin (Rash)  vancomycin (Rash)    Intolerances        LABS:                        14.3   7.36  )-----------( 19       ( 24 Jun 2021 07:55 )             42.6     06-24    137  |  103  |  22  ----------------------------<  125<H>  4.0   |  22  |  0.80    Ca    9.3      24 Jun 2021 07:55    TPro  7.8  /  Alb  4.4  /  TBili  1.2  /  DBili  x   /  AST  32  /  ALT  21  /  AlkPhos  97  06-23    PT/INR - ( 23 Jun 2021 12:38 )   PT: 13.4 sec;   INR: 1.12          PTT - ( 23 Jun 2021 12:38 )  PTT:31.5 sec    CAPILLARY BLOOD GLUCOSE          RADIOLOGY & ADDITIONAL TESTS: Reviewed.    PLAN:

## 2021-06-24 NOTE — DISCHARGE NOTE PROVIDER - CARE PROVIDER_API CALL
Karli Rodriguez)  Internal Medicine  [address] [place] [address]  Phone: [phone]  Fax: [phone]  Scheduled Appointment: 07/01/2021 12:00 PM   Karli Rodriguez)  Internal Medicine  [address] [place] [address]  Phone: [phone]  Fax: [phone]  Scheduled Appointment: 07/01/2021 12:00 PM    Steven Jaimes)  EndocrinologyMetabDiabetes; Internal Medicine  [address], [address]  [place] [address]  Phone: [phone]  Fax: [phone]  Scheduled Appointment: 06/28/2021 09:20 AM   Karli Rodriguez)  Internal Medicine  [address] [place] [address]  Phone: [phone]  Fax: [phone]  Scheduled Appointment: 07/01/2021 12:00 PM    Steven Jaimes)  EndocrinologyMetabDiabetes; Internal Medicine  [address], [address]  [place] [address]  Phone: [phone]  Fax: [phone]  Scheduled Appointment: 06/28/2021 09:20 AM    Jazz Loaiza  [address] [address]  Phone: [phone]  Fax: (   )    -  Scheduled Appointment: 06/30/2021

## 2021-06-24 NOTE — PROGRESS NOTE ADULT - PROBLEM SELECTOR PLAN 2
- Long standing arthritis, thought to be OA  - given ITP with morning stiffness and chronic fatigue small concern for rheum disease.  Rheum consulted  - f/u Lupus labs, RF wnl  - f.u. x-ray left hand given erythema in area

## 2021-06-24 NOTE — DISCHARGE NOTE PROVIDER - NSDCCPCAREPLAN_GEN_ALL_CORE_FT
PRINCIPAL DISCHARGE DIAGNOSIS  Diagnosis: Thrombocytopenia  Assessment and Plan of Treatment: You came to [facility] due to low platelet counts of 2000. You report easy bruising in your upper and lower extremities. 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 However, the blood work so far does not strongly suggest autoimmunity as a cause. The hematology/oncology team was consulted and based on their recommendations we transfused you with two units of platelets, started you on a 4 day course of steroids, and gave you infusions with immunoglobulins. On the day of discharge your platelet counts improved to ___. Please take your last day of decadron 40mg tomorrow. We arranged for close follow-up at the [facility] clinic on July 1st.      SECONDARY DISCHARGE DIAGNOSES  Diagnosis: Hypothyroidism  Assessment and Plan of Treatment: You have a history of thyroid deficiency for which you take synthroid 137 mcg daily. We checked your thyroid stimulating hormone level which was substantially elevated at 24.750. It appears there may be discrepancy regarding your regimen as you admitted to occasionally skipping your synthroid dosages. Please follow-up at the endocrinology clinic and the [facility] clinic.     PRINCIPAL DISCHARGE DIAGNOSIS  Diagnosis: Thrombocytopenia  Assessment and Plan of Treatment: You came to [facility] due to low platelet counts of 2000. You report easy bruising in your upper and lower extremities. 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 However, the blood work so far does not strongly suggest autoimmunity as a cause. The hematology/oncology team was consulted and based on their recommendations we transfused you with two units of platelets, started you on a 4 day course of steroids, and gave you infusions with immunoglobulins. On the day of discharge your platelet counts improved to ___. Please take your last day of decadron 40mg tomorrow. We arranged for close follow-up at the [facility] clinic on July 1st.      SECONDARY DISCHARGE DIAGNOSES  Diagnosis: Hypothyroidism  Assessment and Plan of Treatment: You have a history of thyroid deficiency for which you take synthroid 137 mcg daily. We checked your thyroid stimulating hormone level which was substantially elevated at 24.750. It appears there may be discrepancy regarding your regimen as you admitted to occasionally skipping your synthroid dosages. Please follow-up with Dr. Jaimes in Endocrinology on Monday June 28th at 9:20am.     PRINCIPAL DISCHARGE DIAGNOSIS  Diagnosis: Thrombocytopenia  Assessment and Plan of Treatment: You came to [facility] due to low platelet counts of 2000. You report easy bruising in your upper and lower extremities. 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 However, the blood work so far does not strongly suggest autoimmunity as a cause. The hematology/oncology team was consulted and based on their recommendations we transfused you with two units of platelets, started you on a 4 day course of steroids, and gave you infusions with immunoglobulins. On the day of discharge your platelet counts improved to 115. Please take your last day of decadron 40mg tomorrow. We arranged for close follow-up at the [facility] clinic on July 1st.      SECONDARY DISCHARGE DIAGNOSES  Diagnosis: Hypothyroidism  Assessment and Plan of Treatment: You have a history of thyroid deficiency for which you take synthroid 137 mcg daily. We checked your thyroid stimulating hormone level which was substantially elevated at 24.750. It appears there may be discrepancy regarding your regimen as you admitted to occasionally skipping your synthroid dosages. Please follow-up with Dr. Jaimes in Endocrinology on Monday June 28th at 9:20am.     PRINCIPAL DISCHARGE DIAGNOSIS  Diagnosis: Thrombocytopenia  Assessment and Plan of Treatment: You came to [facility] due to low platelet counts of 2000. You report easy bruising in your upper and lower extremities. 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 However, the blood work so far does not strongly suggest autoimmunity as a cause. The hematology/oncology team was consulted and based on their recommendations we transfused you with two units of platelets, started you on a 4 day course of steroids, and gave you infusions with immunoglobulins. On the day of discharge your platelet counts improved to 115k. Please take your last day of decadron 40mg tomorrow. PLEASE DO NOT TAKE NSAIDS (e.g. ibuprofen, alleve, advil) DUE TO LOW PLATELETS. INSTEAD WE RECOMMEND TYLENOL AS NEEDED FOR YOUR KNEE PAIN. We arranged for close follow-up at the [facility] clinic on July 1st.      SECONDARY DISCHARGE DIAGNOSES  Diagnosis: Hypothyroidism  Assessment and Plan of Treatment: You have a history of thyroid deficiency for which you take synthroid 137 mcg daily Mon-Friday and 125mcg Saturdays and Sundays. We checked your thyroid stimulating hormone level which was substantially elevated at 24.750. It appears there may be discrepancy regarding your regimen as you admitted to occasionally skipping your synthroid dosages. Please follow-up with Dr. Jaimes in Endocrinology on Monday June 28th at 9:20am.     PRINCIPAL DISCHARGE DIAGNOSIS  Diagnosis: Thrombocytopenia  Assessment and Plan of Treatment: You came to [facility] due to low platelet counts of 2000. You report easy bruising in your upper and lower extremities. 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 However, the blood work so far does not strongly suggest autoimmunity as a cause. The hematology/oncology team was consulted and based on their recommendations we transfused you with two units of platelets, started you on a 4 day course of steroids, and gave you infusions with immunoglobulins. On the day of discharge your platelet counts improved to 115k. Please take your last day of decadron 40mg tomorrow. PLEASE DO NOT TAKE NSAIDS (e.g. ibuprofen, alleve, advil) DUE TO LOW PLATELETS. INSTEAD WE RECOMMEND TYLENOL AS NEEDED FOR YOUR KNEE PAIN. We arranged for close follow-up at the [facility] clinic on July 1st. Dr. Loaiza wants to see you in her office this June 30th. Please call the office upon your discharge so that this appointment gets confirmed.      SECONDARY DISCHARGE DIAGNOSES  Diagnosis: Hypothyroidism  Assessment and Plan of Treatment: You have a history of thyroid deficiency for which you take synthroid 137 mcg daily Mon-Friday and 125mcg Saturdays and Sundays. We checked your thyroid stimulating hormone level which was substantially elevated at 24.750. It appears there may be discrepancy regarding your regimen as you admitted to occasionally skipping your synthroid dosages. Please follow-up with Dr. Jaimes in Endocrinology on Monday June 28th at 9:20am.     PRINCIPAL DISCHARGE DIAGNOSIS  Diagnosis: Thrombocytopenia  Assessment and Plan of Treatment: You came to [facility] due to low platelet counts of 2000. You report easy bruising in your upper and lower extremities. 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 However, the blood work so far does not strongly suggest autoimmunity as a cause. The hematology/oncology team was consulted and based on their recommendations we transfused you with two units of platelets, started you on a 4 day course of steroids, and gave you infusions with immunoglobulins. On the day of discharge your platelet counts improved to 115k. Please take your last day of decadron 40mg tomorrow. We arranged for close follow-up at the [facility] clinic on July 1st. Dr. Loaiza wants to see you in her office this June 30th. Please call the office upon your discharge so that this appointment gets confirmed.  Recommendations:  1. Please do not take NSAIDs (ibuprofen, alleve, advil) due to low platelets. Instead we recommend tylenol as needed for your knee pain. 2. Per the hematology/oncology team they also recommend to stop all nutritional supplementation that includes magnesium, zinc and biotin.      SECONDARY DISCHARGE DIAGNOSES  Diagnosis: Hypothyroidism  Assessment and Plan of Treatment: You have a history of thyroid deficiency for which you take synthroid 137 mcg daily Mon-Friday and 125mcg Saturdays and Sundays. We checked your thyroid stimulating hormone level which was substantially elevated at 24.750. It appears there may be discrepancy regarding your regimen as you admitted to occasionally skipping your synthroid dosages. Please follow-up with Dr. Jaimes in Endocrinology on Monday June 28th at 9:20am.     PRINCIPAL DISCHARGE DIAGNOSIS  Diagnosis: Thrombocytopenia  Assessment and Plan of Treatment: You came to [facility] due to low platelet counts of 2000. You report easy bruising in your upper and lower extremities. 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 However, the blood work so far does not strongly suggest autoimmunity as a cause. The hematology/oncology team was consulted and based on their recommendations we transfused you with two units of platelets, started you on a 4 day course of steroids, and gave you infusions with immunoglobulins. On the day of discharge your platelet counts improved to 115k. Please take your last day of decadron 40mg tomorrow. We arranged for close follow-up at the [facility] clinic on July 1st. Dr. Loaiza wants to see you in her office this June 30th. Please call the office upon your discharge so that this appointment gets confirmed.  Recommendations:  1. Please do not take NSAIDs (ibuprofen, alleve, advil) due to low platelets. Instead we recommend tylenol as needed for your knee pain. 2. Per the hematology/oncology team they also recommend to stop all nutritional supplementation that includes magnesium, zinc and biotin.  3. On Saturday, June 26th please take your last day of dexamethasone 40mg at once (6 pills of 6mg and 1 pill of 4mg)      SECONDARY DISCHARGE DIAGNOSES  Diagnosis: Hypothyroidism  Assessment and Plan of Treatment: You have a history of thyroid deficiency for which you take synthroid 137 mcg daily Mon-Friday and 125mcg Saturdays and Sundays. We checked your thyroid stimulating hormone level which was substantially elevated at 24.750. It appears there may be discrepancy regarding your regimen as you admitted to occasionally skipping your synthroid dosages. Please follow-up with Dr. Jaimes in Endocrinology on Monday June 28th at 9:20am.

## 2021-06-24 NOTE — PROGRESS NOTE ADULT - ASSESSMENT
60 yo F, [place] R[place], with PMHx of hypothyroidism, hyperlipidemia, anxiety/depression, macular degeneration, bilateral knee osteoarthritis, was sent to ED for low platelets. Since 1 week, patient has had bruising on her arms/legs/back/torso. Hematology consulted for thrombocytopenia.    #) DIAGNOSIS  - Severe thrombocytopenia, rule-out ITP   - ?Lymphocytosis with basophilia    #) PLAN  - While the patient presents with bruising in the extremities, this is not considered a clinically significant bleed. No palatal petechiae or blood blister noted on physical exam. CT Head non-contrast (06/23) did not show evidence of intracranial bleeding.    - Follow-up peripheral flow cytometry and ERIN. Check HbA1c. HIV and HCV negative. RF WNL, B12/Folate WNL. TSH grossly elevated consistent with a diagnosis of hypothyroidism, poorly controlled. Consider rheumatology consult given clinical presentation suggestive of polymyositis or polymyalgia rheumatica, particularly in relation to bilateral stiff shoulder joints/hip girdle, muscle/joint pain.   - Home Medications reviewed: Naproxen can cause drug-induced ITP. However, she takes it only once-twice weekly. This is rare but known complication of Naproxen. Will consider as possible culprit if other testing is negative/inconclusive.   - US Abdomen to evaluate for hepatosplenomegaly.   - Continue with PO Decadron 40 mg once daily (Day 2 of 4). s/p IVIG x 1 (06/23), will receive 1 additional dose of IVIG today (06/24).    - Monitor CBC with differential once daily.   - Maintain active T+S, transfuse platelets if < 10K or < 20K if febrile or < 50K if clinically important bleeding     Discussed with Dr. Loaiza 60 yo F, [place], with PMHx of hypothyroidism, hyperlipidemia, anxiety/depression, macular degeneration, bilateral knee osteoarthritis, was sent to ED for low platelets. Since 1 week, patient has had bruising on her arms/legs/back/torso. Hematology consulted for thrombocytopenia.    #) DIAGNOSIS  - Severe thrombocytopenia, rule-out ITP   - Lymphopenia ()    #) PLAN  - While the patient presents with bruising in the extremities, this is not considered a clinically significant bleed. No palatal petechiae or blood blister noted on physical exam. CT Head non-contrast (06/23) did not show evidence of intracranial bleeding.    - Follow-up peripheral flow cytometry and ERIN. Check HbA1c. HIV and HCV negative. RF WNL, B12/Folate WNL. TSH grossly elevated consistent with a diagnosis of hypothyroidism, poorly controlled, consider Endocrinology consult. Consider Rheumatology consult given clinical presentation suggestive of polymyositis or polymyalgia rheumatica, particularly in relation to bilateral stiff shoulder joints/hip girdle, muscle/joint pain.   - Home Medications reviewed: Naproxen can cause drug-induced ITP. However, she takes it only once-twice weekly. This is rare but known complication of Naproxen. Will consider as possible culprit if other testing is negative/inconclusive.   - US Abdomen to evaluate for hepatosplenomegaly.   - Continue with PO Decadron 40 mg once daily (Day 2 of 4). s/p IVIG x 1 (06/23), will receive 1 additional dose of IVIG today (06/24). Noticeable improvement in platelets today.   - Monitor CBC with differential once daily.   - Maintain active T+S, transfuse platelets if < 10K or < 20K if febrile or < 50K if clinically important bleeding     Discussed with Dr. Loaiza

## 2021-06-24 NOTE — DISCHARGE NOTE PROVIDER - CARE PROVIDERS DIRECT ADDRESSES
,[email or web address]

## 2021-06-24 NOTE — DISCHARGE NOTE PROVIDER - PROVIDER TOKENS
PROVIDER:[TOKEN:[[id number]],SCHEDULEDAPPT:[07/01/2021],SCHEDULEDAPPTTIME:[12:00 PM]] PROVIDER:[[id number]],SCHEDULEDAPPT:[07/01/2021],SCHEDULEDAPPTTIME:[12:00 PM]],PROVIDER:[TOKEN:[[id number]],SCHEDULEDAPPT:[06/28/2021],SCHEDULEDAPPTTIME:[09:20 AM]] PROVIDER:[[id number]],SCHEDULEDAPPT:[07/01/2021],SCHEDULEDAPPTTIME:[12:00 PM]],PROVIDER:[TOKEN:[[id number]],SCHEDULEDAPPT:[06/28/2021],SCHEDULEDAPPTTIME:[09:20 AM]],FREE:[LAST:[Fadia],FIRST:[Jazz],PHONE:[phone]],FAX:[(   )    -],ADDRESS:[[address] [place] [address]],SCHEDULEDAPPT:[06/30/2021]]

## 2021-06-24 NOTE — DISCHARGE NOTE PROVIDER - NSFOLLOWUPCLINICS_GEN_ALL_ED_FT
[facility]  Family Medicine  [address]  [place], [place] [address]  Phone: [phone]  Fax:

## 2021-06-24 NOTE — DISCHARGE NOTE PROVIDER - NSDCFUSCHEDAPPT_GEN_ALL_CORE_FT
DREW ZAMORA ; 07/01/2021 ; [facility] [address] DREW ZAMORA ; 06/28/2021 ; [facility] [address]  DREW ZAMORA ; 07/01/2021 ; [facility] [address]

## 2021-06-25 ENCOUNTER — TRANSCRIPTION ENCOUNTER (OUTPATIENT)
Age: 61
End: 2021-06-25

## 2021-06-25 VITALS
SYSTOLIC BLOOD PRESSURE: 148 MMHG | OXYGEN SATURATION: 96 % | RESPIRATION RATE: 18 BRPM | HEART RATE: 70 BPM | DIASTOLIC BLOOD PRESSURE: 88 MMHG | TEMPERATURE: 98 F

## 2021-06-25 LAB
ALBUMIN SERPL ELPH-MCNC: 3.7 G/DL — SIGNIFICANT CHANGE UP (ref 3.3–5)
ALBUMIN SERPL ELPH-MCNC: 3.8 G/DL — SIGNIFICANT CHANGE UP (ref 3.3–5)
ALP SERPL-CCNC: 76 U/L — SIGNIFICANT CHANGE UP (ref 40–120)
ALP SERPL-CCNC: 85 U/L — SIGNIFICANT CHANGE UP (ref 40–120)
ALT FLD-CCNC: 19 U/L — SIGNIFICANT CHANGE UP (ref 10–45)
ALT FLD-CCNC: 19 U/L — SIGNIFICANT CHANGE UP (ref 10–45)
ANION GAP SERPL CALC-SCNC: 11 MMOL/L — SIGNIFICANT CHANGE UP (ref 5–17)
ANION GAP SERPL CALC-SCNC: 12 MMOL/L — SIGNIFICANT CHANGE UP (ref 5–17)
ANTI-RIBONUCLEAR PROTEIN: 0.4 AI — SIGNIFICANT CHANGE UP
AST SERPL-CCNC: 30 U/L — SIGNIFICANT CHANGE UP (ref 10–40)
AST SERPL-CCNC: 33 U/L — SIGNIFICANT CHANGE UP (ref 10–40)
BASOPHILS # BLD AUTO: 0.03 K/UL — SIGNIFICANT CHANGE UP (ref 0–0.2)
BASOPHILS # BLD AUTO: 0.04 K/UL — SIGNIFICANT CHANGE UP (ref 0–0.2)
BASOPHILS NFR BLD AUTO: 0.3 % — SIGNIFICANT CHANGE UP (ref 0–2)
BASOPHILS NFR BLD AUTO: 0.4 % — SIGNIFICANT CHANGE UP (ref 0–2)
BILIRUB SERPL-MCNC: 0.7 MG/DL — SIGNIFICANT CHANGE UP (ref 0.2–1.2)
BILIRUB SERPL-MCNC: 0.7 MG/DL — SIGNIFICANT CHANGE UP (ref 0.2–1.2)
BUN SERPL-MCNC: 22 MG/DL — SIGNIFICANT CHANGE UP (ref 7–23)
BUN SERPL-MCNC: 24 MG/DL — HIGH (ref 7–23)
C3 SERPL-MCNC: 131 MG/DL — SIGNIFICANT CHANGE UP (ref 81–157)
C4 SERPL-MCNC: 27 MG/DL — SIGNIFICANT CHANGE UP (ref 13–39)
CALCIUM SERPL-MCNC: 8.7 MG/DL — SIGNIFICANT CHANGE UP (ref 8.4–10.5)
CALCIUM SERPL-MCNC: 8.8 MG/DL — SIGNIFICANT CHANGE UP (ref 8.4–10.5)
CHLORIDE SERPL-SCNC: 104 MMOL/L — SIGNIFICANT CHANGE UP (ref 96–108)
CHLORIDE SERPL-SCNC: 106 MMOL/L — SIGNIFICANT CHANGE UP (ref 96–108)
CO2 SERPL-SCNC: 18 MMOL/L — LOW (ref 22–31)
CO2 SERPL-SCNC: 20 MMOL/L — LOW (ref 22–31)
CONFIRM APTT STACLOT: NEGATIVE — SIGNIFICANT CHANGE UP
CREAT SERPL-MCNC: 0.87 MG/DL — SIGNIFICANT CHANGE UP (ref 0.5–1.3)
CREAT SERPL-MCNC: 0.89 MG/DL — SIGNIFICANT CHANGE UP (ref 0.5–1.3)
DRVVT SCREEN TO CONFIRM RATIO: SIGNIFICANT CHANGE UP
DSDNA AB SER-ACNC: 24 IU/ML — SIGNIFICANT CHANGE UP
ENA SM AB FLD QL: <0.2 AI — SIGNIFICANT CHANGE UP
EOSINOPHIL # BLD AUTO: 0 K/UL — SIGNIFICANT CHANGE UP (ref 0–0.5)
EOSINOPHIL # BLD AUTO: 0 K/UL — SIGNIFICANT CHANGE UP (ref 0–0.5)
EOSINOPHIL NFR BLD AUTO: 0 % — SIGNIFICANT CHANGE UP (ref 0–6)
EOSINOPHIL NFR BLD AUTO: 0 % — SIGNIFICANT CHANGE UP (ref 0–6)
GLUCOSE BLDC GLUCOMTR-MCNC: 114 MG/DL — HIGH (ref 70–99)
GLUCOSE BLDC GLUCOMTR-MCNC: 121 MG/DL — HIGH (ref 70–99)
GLUCOSE SERPL-MCNC: 105 MG/DL — HIGH (ref 70–99)
GLUCOSE SERPL-MCNC: 146 MG/DL — HIGH (ref 70–99)
HCT VFR BLD CALC: 38.9 % — SIGNIFICANT CHANGE UP (ref 34.5–45)
HCT VFR BLD CALC: 39.5 % — SIGNIFICANT CHANGE UP (ref 34.5–45)
HGB BLD-MCNC: 12.9 G/DL — SIGNIFICANT CHANGE UP (ref 11.5–15.5)
HGB BLD-MCNC: 13.1 G/DL — SIGNIFICANT CHANGE UP (ref 11.5–15.5)
IMM GRANULOCYTES NFR BLD AUTO: 0.6 % — SIGNIFICANT CHANGE UP (ref 0–1.5)
IMM GRANULOCYTES NFR BLD AUTO: 0.8 % — SIGNIFICANT CHANGE UP (ref 0–1.5)
LA NT DPL PPP QL: 30.8 SEC — SIGNIFICANT CHANGE UP
LYMPHOCYTES # BLD AUTO: 0.89 K/UL — LOW (ref 1–3.3)
LYMPHOCYTES # BLD AUTO: 1.01 K/UL — SIGNIFICANT CHANGE UP (ref 1–3.3)
LYMPHOCYTES # BLD AUTO: 9 % — LOW (ref 13–44)
LYMPHOCYTES # BLD AUTO: 9.5 % — LOW (ref 13–44)
MAGNESIUM SERPL-MCNC: 2.3 MG/DL — SIGNIFICANT CHANGE UP (ref 1.6–2.6)
MAGNESIUM SERPL-MCNC: 2.3 MG/DL — SIGNIFICANT CHANGE UP (ref 1.6–2.6)
MCHC RBC-ENTMCNC: 31.3 PG — SIGNIFICANT CHANGE UP (ref 27–34)
MCHC RBC-ENTMCNC: 32 PG — SIGNIFICANT CHANGE UP (ref 27–34)
MCHC RBC-ENTMCNC: 32.7 GM/DL — SIGNIFICANT CHANGE UP (ref 32–36)
MCHC RBC-ENTMCNC: 33.7 GM/DL — SIGNIFICANT CHANGE UP (ref 32–36)
MCV RBC AUTO: 94.9 FL — SIGNIFICANT CHANGE UP (ref 80–100)
MCV RBC AUTO: 95.9 FL — SIGNIFICANT CHANGE UP (ref 80–100)
MONOCYTES # BLD AUTO: 0.26 K/UL — SIGNIFICANT CHANGE UP (ref 0–0.9)
MONOCYTES # BLD AUTO: 0.55 K/UL — SIGNIFICANT CHANGE UP (ref 0–0.9)
MONOCYTES NFR BLD AUTO: 2.6 % — SIGNIFICANT CHANGE UP (ref 2–14)
MONOCYTES NFR BLD AUTO: 5.2 % — SIGNIFICANT CHANGE UP (ref 2–14)
NEUTROPHILS # BLD AUTO: 8.63 K/UL — HIGH (ref 1.8–7.4)
NEUTROPHILS # BLD AUTO: 9.01 K/UL — HIGH (ref 1.8–7.4)
NEUTROPHILS NFR BLD AUTO: 84.3 % — HIGH (ref 43–77)
NEUTROPHILS NFR BLD AUTO: 87.3 % — HIGH (ref 43–77)
NRBC # BLD: 0 /100 WBCS — SIGNIFICANT CHANGE UP (ref 0–0)
NRBC # BLD: 0 /100 WBCS — SIGNIFICANT CHANGE UP (ref 0–0)
PHOSPHATE SERPL-MCNC: 3 MG/DL — SIGNIFICANT CHANGE UP (ref 2.5–4.5)
PHOSPHATE SERPL-MCNC: 3.2 MG/DL — SIGNIFICANT CHANGE UP (ref 2.5–4.5)
PLATELET # BLD AUTO: 115 K/UL — LOW (ref 150–400)
PLATELET # BLD AUTO: 118 K/UL — LOW (ref 150–400)
POTASSIUM SERPL-MCNC: 4.1 MMOL/L — SIGNIFICANT CHANGE UP (ref 3.5–5.3)
POTASSIUM SERPL-MCNC: 4.2 MMOL/L — SIGNIFICANT CHANGE UP (ref 3.5–5.3)
POTASSIUM SERPL-SCNC: 4.1 MMOL/L — SIGNIFICANT CHANGE UP (ref 3.5–5.3)
POTASSIUM SERPL-SCNC: 4.2 MMOL/L — SIGNIFICANT CHANGE UP (ref 3.5–5.3)
PROT SERPL-MCNC: 10 G/DL — HIGH (ref 6–8.3)
PROT SERPL-MCNC: 10.4 G/DL — HIGH (ref 6–8.3)
RBC # BLD: 4.1 M/UL — SIGNIFICANT CHANGE UP (ref 3.8–5.2)
RBC # BLD: 4.12 M/UL — SIGNIFICANT CHANGE UP (ref 3.8–5.2)
RBC # FLD: 12.1 % — SIGNIFICANT CHANGE UP (ref 10.3–14.5)
RBC # FLD: 12.1 % — SIGNIFICANT CHANGE UP (ref 10.3–14.5)
SODIUM SERPL-SCNC: 135 MMOL/L — SIGNIFICANT CHANGE UP (ref 135–145)
SODIUM SERPL-SCNC: 136 MMOL/L — SIGNIFICANT CHANGE UP (ref 135–145)
WBC # BLD: 10.67 K/UL — HIGH (ref 3.8–10.5)
WBC # BLD: 9.89 K/UL — SIGNIFICANT CHANGE UP (ref 3.8–10.5)
WBC # FLD AUTO: 10.67 K/UL — HIGH (ref 3.8–10.5)
WBC # FLD AUTO: 9.89 K/UL — SIGNIFICANT CHANGE UP (ref 3.8–10.5)

## 2021-06-25 PROCEDURE — 87389 HIV-1 AG W/HIV-1&-2 AB AG IA: CPT

## 2021-06-25 PROCEDURE — 84100 ASSAY OF PHOSPHORUS: CPT

## 2021-06-25 PROCEDURE — 86225 DNA ANTIBODY NATIVE: CPT

## 2021-06-25 PROCEDURE — 86038 ANTINUCLEAR ANTIBODIES: CPT

## 2021-06-25 PROCEDURE — 85598 HEXAGNAL PHOSPH PLTLT NEUTRL: CPT

## 2021-06-25 PROCEDURE — 86803 HEPATITIS C AB TEST: CPT

## 2021-06-25 PROCEDURE — 86160 COMPLEMENT ANTIGEN: CPT

## 2021-06-25 PROCEDURE — 86706 HEP B SURFACE ANTIBODY: CPT

## 2021-06-25 PROCEDURE — 86769 SARS-COV-2 COVID-19 ANTIBODY: CPT

## 2021-06-25 PROCEDURE — 86140 C-REACTIVE PROTEIN: CPT

## 2021-06-25 PROCEDURE — 85652 RBC SED RATE AUTOMATED: CPT

## 2021-06-25 PROCEDURE — 86431 RHEUMATOID FACTOR QUANT: CPT

## 2021-06-25 PROCEDURE — 85025 COMPLETE CBC W/AUTO DIFF WBC: CPT

## 2021-06-25 PROCEDURE — 70450 CT HEAD/BRAIN W/O DYE: CPT

## 2021-06-25 PROCEDURE — 86709 HEPATITIS A IGM ANTIBODY: CPT

## 2021-06-25 PROCEDURE — 82607 VITAMIN B-12: CPT

## 2021-06-25 PROCEDURE — 99285 EMERGENCY DEPT VISIT HI MDM: CPT | Mod: 25

## 2021-06-25 PROCEDURE — 86901 BLOOD TYPING SEROLOGIC RH(D): CPT

## 2021-06-25 PROCEDURE — 83615 LACTATE (LD) (LDH) ENZYME: CPT

## 2021-06-25 PROCEDURE — 83010 ASSAY OF HAPTOGLOBIN QUANT: CPT

## 2021-06-25 PROCEDURE — 80053 COMPREHEN METABOLIC PANEL: CPT

## 2021-06-25 PROCEDURE — 86235 NUCLEAR ANTIGEN ANTIBODY: CPT

## 2021-06-25 PROCEDURE — 85610 PROTHROMBIN TIME: CPT

## 2021-06-25 PROCEDURE — 86900 BLOOD TYPING SEROLOGIC ABO: CPT

## 2021-06-25 PROCEDURE — 86850 RBC ANTIBODY SCREEN: CPT

## 2021-06-25 PROCEDURE — 93005 ELECTROCARDIOGRAM TRACING: CPT

## 2021-06-25 PROCEDURE — 80048 BASIC METABOLIC PNL TOTAL CA: CPT

## 2021-06-25 PROCEDURE — 83735 ASSAY OF MAGNESIUM: CPT

## 2021-06-25 PROCEDURE — 87340 HEPATITIS B SURFACE AG IA: CPT

## 2021-06-25 PROCEDURE — 84439 ASSAY OF FREE THYROXINE: CPT

## 2021-06-25 PROCEDURE — 36415 COLL VENOUS BLD VENIPUNCTURE: CPT

## 2021-06-25 PROCEDURE — 87635 SARS-COV-2 COVID-19 AMP PRB: CPT

## 2021-06-25 PROCEDURE — 36430 TRANSFUSION BLD/BLD COMPNT: CPT

## 2021-06-25 PROCEDURE — P9037: CPT

## 2021-06-25 PROCEDURE — 85730 THROMBOPLASTIN TIME PARTIAL: CPT

## 2021-06-25 PROCEDURE — 99239 HOSP IP/OBS DSCHRG MGMT >30: CPT | Mod: GC

## 2021-06-25 PROCEDURE — 86704 HEP B CORE ANTIBODY TOTAL: CPT

## 2021-06-25 PROCEDURE — 82962 GLUCOSE BLOOD TEST: CPT

## 2021-06-25 PROCEDURE — 82746 ASSAY OF FOLIC ACID SERUM: CPT

## 2021-06-25 PROCEDURE — 96374 THER/PROPH/DIAG INJ IV PUSH: CPT

## 2021-06-25 PROCEDURE — 84443 ASSAY THYROID STIM HORMONE: CPT

## 2021-06-25 PROCEDURE — 86705 HEP B CORE ANTIBODY IGM: CPT

## 2021-06-25 RX ORDER — DEXAMETHASONE 0.5 MG/5ML
1 ELIXIR ORAL
Qty: 1 | Refills: 0
Start: 2021-06-25 | End: 2021-06-25

## 2021-06-25 RX ORDER — DEXAMETHASONE 0.5 MG/5ML
2 ELIXIR ORAL
Qty: 2 | Refills: 0
Start: 2021-06-25 | End: 2021-06-25

## 2021-06-25 RX ORDER — DEXAMETHASONE 0.5 MG/5ML
6 ELIXIR ORAL
Qty: 6 | Refills: 0
Start: 2021-06-25 | End: 2021-06-25

## 2021-06-25 RX ORDER — DEXAMETHASONE 0.5 MG/5ML
40 ELIXIR ORAL ONCE
Refills: 0 | Status: COMPLETED | OUTPATIENT
Start: 2021-06-25 | End: 2021-06-25

## 2021-06-25 RX ADMIN — PANTOPRAZOLE SODIUM 40 MILLIGRAM(S): 20 TABLET, DELAYED RELEASE ORAL at 06:18

## 2021-06-25 RX ADMIN — Medication 1 APPLICATION(S): at 06:24

## 2021-06-25 RX ADMIN — Medication 137 MICROGRAM(S): at 06:34

## 2021-06-25 RX ADMIN — Medication 40 MILLIGRAM(S): at 13:31

## 2021-06-25 RX ADMIN — SERTRALINE 50 MILLIGRAM(S): 25 TABLET, FILM COATED ORAL at 11:32

## 2021-06-25 NOTE — PROGRESS NOTE ADULT - SUBJECTIVE AND OBJECTIVE BOX
LENGTH OF HOSPITAL STAY: 2d    SUBJECTIVE: No acute overnight events    HISTORY OF PRESENTING ILLNESS:   60 y/o F w/PMH of HLD, Hypothyroid, Depression, arthritis, macular degeneration who presents to the ED due to abnormal lab results. Patient was obtaining labs after she was noticed to have easy bruising over her arm and legs for the last week and was noticed to have plts of 5. She was in her normal state of health when 2 weeks ago noticed swelling on her left palm w/itching and then a few days later she noticed the easy bruising. She has also noticed a headache that has come intermittently over the last couple days and noticed darker stool for the last two days but no change in caliber of stool. Pateint suffers from long standing fatigue as well as joint stiffness, worse in back, knees and shoulder, that occurs in the morning and eventually improves after a couple of hours. 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 Denies fever, night sweats, gum bleeding, rash, joint swelling, chest pain, palpitation, SOB, abdominal pain, dysruia. Patient denies any recent illnesses or alcohol use. Had covid in october    PMH: hypothyroid, HLD, depression, arthritis, macular degeneration, arthritis  PSH: orthscopic knee  Allergies: Azithro, Vanc, penecillin (rash to all)  FH: no hematological disorder  SH: casual EToH <1 drink per month, denies tobacco or recreational drug use; works in a [employer] firm    In ED:  Afebrile, HR 60-85, -160/80-90, O2 95+ on RA  Labs: WBC w/minimal lymphocyte pred, Hgb WNL, Plts 2; ESR 33; ,   Images: [facility] awaiting read  Interventions: Decadron 40, PLTs x2   (23 Jun 2021 15:07)    PAST MEDICAL & SURGICAL HISTORY:  Hypothyroidism  HLD (hyperlipidemia)  No significant past surgical history    ALLERGIES:  azithromycin (Rash)  penicillin (Rash)  vancomycin (Rash)    MEDICATIONS:  STANDING MEDICATIONS  clotrimazole 1% Cream 1 Application(s) Topical two times a day  dexAMETHasone     Tablet 40 milliGRAM(s) Oral every 24 hours  dextrose 40% Gel 15 Gram(s) Oral once  dextrose 5%. 1000 milliLiter(s) IV Continuous <Continuous>  dextrose 5%. 1000 milliLiter(s) IV Continuous <Continuous>  dextrose 50% Injectable 25 Gram(s) IV Push once  dextrose 50% Injectable 12.5 Gram(s) IV Push once  dextrose 50% Injectable 25 Gram(s) IV Push once  glucagon  Injectable 1 milliGRAM(s) IntraMuscular once  insulin lispro (ADMELOG) corrective regimen sliding scale   SubCutaneous Before meals and at bedtime  levothyroxine 125 MICROGram(s) Oral <User Schedule>  levothyroxine 137 MICROGram(s) Oral <User Schedule>  pantoprazole    Tablet 40 milliGRAM(s) Oral before breakfast  sertraline 50 milliGRAM(s) Oral daily    PRN MEDICATIONS    VITALS:   T(F): 98.1  HR: 70  BP: 148/88  RR: 18  SpO2: 96%    LABS:                        12.9   9.89  )-----------( 115      ( 25 Jun 2021 11:00 )             39.5     06-25    136  |  106  |  24<H>  ----------------------------<  146<H>  4.1   |  18<L>  |  0.87    Ca    8.8      25 Jun 2021 11:01  Phos  3.2     06-25  Mg     2.3     06-25    TPro  10.4<H>  /  Alb  3.7  /  TBili  0.7  /  DBili  x   /  AST  30  /  ALT  19  /  AlkPhos  76  06-25    RADIOLOGY:  Reviewed    PHYSICAL EXAM:  GEN: No acute distress  HEENT: NCAT  LUNGS: Clear to auscultation bilaterally   HEART: S1/S2 present. RRR.   ABD: Soft, non-tender, non-distended. Bowel sounds present  EXT: No pitting edema, bruising in all extremities, healing.   NEURO: AAOX3

## 2021-06-25 NOTE — PROGRESS NOTE ADULT - PROBLEM SELECTOR PLAN 8
- c/w sertaline 50 mg qd  - Patient w/appropriate affect but can tell she is nervous
- c/w sertaline 50 mg qd  - Patient w/appropriate affect but is nervous

## 2021-06-25 NOTE — PROGRESS NOTE ADULT - PROBLEM SELECTOR PLAN 7
- c/w home levo 137 m-f; 125 sat and sun  - make sure patient is taking levo appropriately before DC (on empty stomach 1 hour before meals)  -F/u appointment with [facility]
- c/w home levo 137 m-f; 125 sat and sun  - make sure patient is taking levo appropriately before DC (on empty stomach 1 hour before meals)  -F/u appointment with [facility] for continuity of care

## 2021-06-25 NOTE — PROGRESS NOTE ADULT - PROBLEM SELECTOR PLAN 2
- Long standing arthritis, thought to be OA  - given ITP with morning stiffness and chronic fatigue small concern for rheum disease.  Rheum consulted  - f/u Lupus labs, RF wnl  - f.u. x-ray left hand

## 2021-06-25 NOTE — PROGRESS NOTE ADULT - ATTENDING COMMENTS
Patient was seen and examined with the resident team today.  I agree with the above assessment and plan with the following exceptions/additions:     Briefly, this is a 62yo woman with a PMH of HLD, hypothyroidism, mild recurrent MDD, arthritis NOS and macular degeneration who p/w subacute onset of ecchymosis and petechia, referred to the ED after outpatient labs revealed profound thrombocytopenia.  Presentation c/f ITP.  Appears to be improving with IVIG x2 doses and steroids.     #ITP - Decadron x4 days per H/O (last day 6/26); s/p 2 doses of IVIG (6/23 and 6/24); f/u flow cytometry and official read of abdominal ultrasound; avoid NSAIDs and agree with PPI for GI Ppx; would like Hematology f/u at [facility]  #Arthritis NOS, SLE R/O - f/u lupus serologies, Rheum aware and will f/u labs    #Poorly controlled hypothyroidism - TSH >24, appears to have integumentary manifestations of hypothyroidism; c/w current regimen to avoid overwhelming her (she appears very anxious) but please refer to an Endocrinologist at [facility]   #Mild recurrent MDD / (suspected) CAD - c/w Zoloft  #Preventative Medicine - would like new PCP within [facility]   #DVT PPx - ambulatory and also thrombocytopenic   #Dispo - home once medically cleared     Dilma Rojas  [phone]
Patient was seen and examined with the resident team today.  I agree with the above assessment and plan with the following exceptions/additions:     Briefly, this is a 62yo woman with a PMH of HLD, hypothyroidism, mild recurrent MDD, arthritis NOS and macular degeneration who p/w subacute onset of ecchymosis and petechia, referred to the ED after outpatient labs revealed profound thrombocytopenia.  Presentation c/f ITP.     #ITP - Decadron x4 days per H/O; will get 2nd dose of IVIG today; f/u flow cytometry and official read of abdominal ultrasound; avoid NSAIDs and agree with PPI for GI Ppx; would like Hematology f/u at [facility]  #Arthritis NOS, SLE R/O - f/u lupus serologies, Rheum consult today; f/u hand x-ray   #Poorly controlled hypothyroidism - TSH >24, appears to have integumentary manifestations of hypothyroidism; c/w current regimen to avoid overwhelming her (she appears very anxious) but please refer to an Endocrinologist at [facility]   #Mild recurrent MDD / (suspected) CAD - c/w Zoloft  #Preventative Medicine - would like new PCP within [facility]   #DVT PPx - ambulatory and also thrombocytopenic   #Dispo - home once medically cleared     Dilma Rojas  [phone]

## 2021-06-25 NOTE — DISCHARGE NOTE NURSING/CASE MANAGEMENT/SOCIAL WORK - NSDCFUADDAPPT_GEN_ALL_CORE_FT
Please bring your Insurance card, Photo ID and Discharge paperwork to the following appointment:    (1) Please follow up with your Primary Care Provider Care, Dr. Cirilo Mac on behalf of Dr. Karli Rodriguez at [address] [place] [address], [address] [place] [address] on 07/01/2021 at 12:00pm.    (2) Per Dr. Loaiza from hematology/oncology please schedule an appointment with her for this coming Wednesday, June 30th. The office can be reached at [phone]. Please mention that Dr. Loaiza evaluated you for low platelets at [facility] and the team specifically said they will squeeze you in for an appointment for June 30th.     Appointment was scheduled by Ms. NICOLE Kim, Referral Coordinator.

## 2021-06-25 NOTE — PROGRESS NOTE ADULT - PROBLEM SELECTOR PLAN 5
- over left hand that started two weeks ago and is itchy  - slightly erythematous w/o fluctuation   - monitor for improvement  - f/u hand xray
- over left hand that started two weeks ago and is itchy  - slightly erythematous w/o fluctuation   - c/w clotrimazole cream and monitor for improvement  - f/u hand xray

## 2021-06-25 NOTE — PROGRESS NOTE ADULT - SUBJECTIVE AND OBJECTIVE BOX
OVERNIGHT EVENTS: No o/n events    SUBJECTIVE / INTERVAL HPI: Patient seen and examined at bedside.  Patient was sleeping on approach, but awoke on approach.  She reported decrease sleep for a total of 4 hours.  She does not report any new bruises.  She denies abdominal pain, SOB, chest pain, n/v/d.     VITAL SIGNS:  Vital Signs Last 24 Hrs  T(C): 36.9 (25 Jun 2021 06:05), Max: 36.9 (24 Jun 2021 11:26)  T(F): 98.5 (25 Jun 2021 06:05), Max: 98.5 (25 Jun 2021 06:05)  HR: 71 (25 Jun 2021 06:05) (71 - 86)  BP: 130/79 (25 Jun 2021 06:05) (126/75 - 146/96)  BP(mean): --  RR: 18 (25 Jun 2021 06:05) (18 - 18)  SpO2: 95% (25 Jun 2021 06:05) (95% - 97%)    PHYSICAL EXAM:    General: Well developed, well nourished, no acute distress  HEENT: NC/AT; PERRL, anicteric sclera; MMM  Neck: supple  Cardiovascular: +S1/S2, RRR, no murmurs, rubs, gallops  Respiratory: CTA B/L; no W/R/R  Gastrointestinal: soft, NT/ND; +BSx4; ecchymosis on the LLQ   Extremities: WWP; no edema, clubbing or cyanosis, petechiae on the LE b/l, ecchymosis on the UE and LE b/l in various stages of healing  Vascular: 2+ radial, DP/PT pulses B/L  Neurological: AAOx3; no focal deficits    MEDICATIONS:  MEDICATIONS  (STANDING):  clotrimazole 1% Cream 1 Application(s) Topical two times a day  dexAMETHasone     Tablet 40 milliGRAM(s) Oral every 24 hours  dextrose 40% Gel 15 Gram(s) Oral once  dextrose 5%. 1000 milliLiter(s) (50 mL/Hr) IV Continuous <Continuous>  dextrose 5%. 1000 milliLiter(s) (100 mL/Hr) IV Continuous <Continuous>  dextrose 50% Injectable 25 Gram(s) IV Push once  dextrose 50% Injectable 12.5 Gram(s) IV Push once  dextrose 50% Injectable 25 Gram(s) IV Push once  glucagon  Injectable 1 milliGRAM(s) IntraMuscular once  insulin lispro (ADMELOG) corrective regimen sliding scale   SubCutaneous Before meals and at bedtime  levothyroxine 125 MICROGram(s) Oral <User Schedule>  levothyroxine 137 MICROGram(s) Oral <User Schedule>  pantoprazole    Tablet 40 milliGRAM(s) Oral before breakfast  sertraline 50 milliGRAM(s) Oral daily    MEDICATIONS  (PRN):      ALLERGIES:  Allergies    azithromycin (Rash)  penicillin (Rash)  vancomycin (Rash)    Intolerances        LABS:                        14.3   7.36  )-----------( 19       ( 24 Jun 2021 07:55 )             42.6     06-24    137  |  103  |  22  ----------------------------<  125<H>  4.0   |  22  |  0.80    Ca    9.3      24 Jun 2021 07:55    TPro  7.8  /  Alb  4.4  /  TBili  1.2  /  DBili  x   /  AST  32  /  ALT  21  /  AlkPhos  97  06-23    PT/INR - ( 23 Jun 2021 12:38 )   PT: 13.4 sec;   INR: 1.12          PTT - ( 23 Jun 2021 12:38 )  PTT:31.5 sec    CAPILLARY BLOOD GLUCOSE      POCT Blood Glucose.: 121 mg/dL (25 Jun 2021 08:18)      RADIOLOGY & ADDITIONAL TESTS: Reviewed.    PLAN:

## 2021-06-25 NOTE — PROGRESS NOTE ADULT - PROBLEM SELECTOR PLAN 1
- Presents w/ plts 2 and remainder of CBC w/diff grossly normal; w/o clinically sig bleeding  - possible ITP  in etiology but no clear inciting event, pt will be evaluated by rheum for underlying autoimmune disorder such as lupus and rheum arthritis given history of morning stiffness, but unlikely given the age of presentation  - f/u heme onc labs (Lupus labs), other recommended labs (B12, Folate, FT4, Hep, HIV, RF) wnl. ERIN mildy elevated  - f/u flow cytometry, peripheral smear demonstrated red cell abnormal morphology  - c/w Decadron 40mg x4D; s/p 2nd round of IVIG  - Advise patient in DC note to avoid naproxen  - s/p 2 platelet transfusions  - Maintain active T+S, transfuse platelets if < 10K or < 20K if febrile or < 50K if clinically important bleeding

## 2021-06-25 NOTE — DISCHARGE NOTE NURSING/CASE MANAGEMENT/SOCIAL WORK - PATIENT PORTAL LINK FT
You can access the FollowMyHealth Patient Portal offered by [facility] by registering at the following website: [email or web address]. By joining [employer]’s FollowMyHealth portal, you will also be able to view your health information using other applications (apps) compatible with our system.
none

## 2021-06-25 NOTE — PROGRESS NOTE ADULT - ASSESSMENT
62 y/o F w/PMH of HLD, Hypothyroid, Depression, arthritis, macular degeneration who presents to the ED due to having otupatient PLTS of 2 w/o known cause

## 2021-06-25 NOTE — PROGRESS NOTE ADULT - ASSESSMENT
60 yo F, [place], with PMHx of hypothyroidism, hyperlipidemia, anxiety/depression, macular degeneration, bilateral knee osteoarthritis, was sent to ED for low platelets. Since 1 week, patient has had bruising on her arms/legs/back/torso. Hematology consulted for thrombocytopenia.    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 No palatal petechiae or blood blister noted on physical exam. CT Head non-contrast (06/23) did not show evidence of intracranial bleeding.    - Follow-up peripheral flow cytometry. HIV and HCV negative. RF WNL, B12/Folate WNL. TSH grossly elevated consistent with a diagnosis of hypothyroidism, poorly controlled, consider Endocrinology consult. Consider Rheumatology consult given clinical presentation suggestive of polymyositis or polymyalgia rheumatica, particularly in relation to bilateral stiff shoulder joints/hip girdle, muscle/joint pain, and ERIN 1:80.   - Home Medications reviewed: Naproxen can cause drug-induced ITP. However, she takes it only once-twice weekly. This is rare but known complication of Naproxen. Will consider as possible culprit if other testing is negative/inconclusive. Advise to stop all nutritional supplementation including magnesium, zinc and biotin.   - Can undergo US Abdomen to evaluate for hepatosplenomegaly as outpatient  - Continue with PO Decadron 40 mg once daily (Day 3 of 4). s/p IVIG x 2 (06/23, 06/24). Continued improvement in platelets today.   - Monitor CBC with differential once daily.   - Maintain active T+S, transfuse platelets if < 10K or < 20K if febrile or < 50K if clinically important bleeding   - Can discharge to see Dr. Jazz Loaiza at [facility] next Wednesday. Patient will have to call  to schedule an appointment.     Discussed with Dr. Loaiza

## 2021-06-25 NOTE — PROGRESS NOTE ADULT - PROBLEM SELECTOR PLAN 3
- new headache over last couple of weeks  - no deficits on Neuro exam  - head CT neg for acute bleed but w/artifact; given no neuro symptoms will not repeat right now  - avoid NSAIDs for pain control
- new headache over last couple of weeks, not reported today   - no deficits on Neuro exam  - head CT neg for acute bleed but w/artifact; given no neuro symptoms will not repeat right now  - avoid NSAIDs for pain control

## 2021-06-25 NOTE — PROGRESS NOTE ADULT - PROBLEM SELECTOR PLAN 6
- on Ezetimibime at home  - hold as no interchange in hosptial and patient with intolerable side effects to statin
- on Ezetimibime at home  - hold as no interchange in hospital and patient with intolerable side effects to statin

## 2021-06-25 NOTE — PROGRESS NOTE ADULT - PROBLEM SELECTOR PLAN 4
- was euvolemic on exam, but seen after fluids given  - will encourage PO intake for now and follow-up AM BMP  -BMP today shows Na of 137
- was euvolemic on exam, but seen after fluids given  - will encourage PO intake for now and follow-up AM BMP  -Resolved yesterday 6.24

## 2021-06-28 ENCOUNTER — APPOINTMENT (OUTPATIENT)
Age: 61
End: 2021-06-28
Payer: COMMERCIAL

## 2021-06-28 VITALS
HEIGHT: 62 IN | HEART RATE: 73 BPM | SYSTOLIC BLOOD PRESSURE: 108 MMHG | WEIGHT: 183 LBS | BODY MASS INDEX: 33.68 KG/M2 | DIASTOLIC BLOOD PRESSURE: 76 MMHG

## 2021-06-28 PROCEDURE — 99204 OFFICE O/P NEW MOD 45 MIN: CPT

## 2021-06-28 PROCEDURE — 99072 ADDL SUPL MATRL&STAF TM PHE: CPT

## 2021-06-30 ENCOUNTER — LABORATORY RESULT (OUTPATIENT)
Age: 61
End: 2021-06-30

## 2021-06-30 ENCOUNTER — APPOINTMENT (OUTPATIENT)
Dept: HEMATOLOGY ONCOLOGY | Facility: CLINIC | Age: 61
End: 2021-06-30
Payer: COMMERCIAL

## 2021-06-30 ENCOUNTER — NON-APPOINTMENT (OUTPATIENT)
Age: 61
End: 2021-06-30

## 2021-06-30 VITALS
WEIGHT: 183 LBS | DIASTOLIC BLOOD PRESSURE: 86 MMHG | SYSTOLIC BLOOD PRESSURE: 136 MMHG | HEART RATE: 82 BPM | BODY MASS INDEX: 34.55 KG/M2 | TEMPERATURE: 97.4 F | HEIGHT: 61.02 IN | OXYGEN SATURATION: 97 %

## 2021-06-30 DIAGNOSIS — Z78.9 OTHER SPECIFIED HEALTH STATUS: ICD-10-CM

## 2021-06-30 PROCEDURE — 99204 OFFICE O/P NEW MOD 45 MIN: CPT | Mod: 25

## 2021-06-30 PROCEDURE — 36415 COLL VENOUS BLD VENIPUNCTURE: CPT

## 2021-06-30 PROCEDURE — 99072 ADDL SUPL MATRL&STAF TM PHE: CPT

## 2021-06-30 RX ORDER — NAPROXEN 500 MG/1
500 TABLET ORAL
Qty: 60 | Refills: 0 | Status: DISCONTINUED | COMMUNITY
Start: 2021-06-18 | End: 2021-06-30

## 2021-06-30 NOTE — PHYSICAL EXAM
[Normal] : affect appropriate [de-identified] : Raised tender rash on left shin, E. Nodosum? [de-identified] : Patient is covered in multiple ecchymosis and petechia

## 2021-06-30 NOTE — ASSESSMENT
[FreeTextEntry1] : Repeat blood work done including CBC and ACE level...\par \par Patient was strongly advised to DC all OTC meds... She was given an article about the dangers of vitamins...\par \par \par Follow-up here in 2 weeks or sooner if needed.

## 2021-06-30 NOTE — HISTORY OF PRESENT ILLNESS
[de-identified] : 61 years old female admitted to [facility] with ITP platelets of 2000... Patient was placed on Decadron and IVIG and was discharged 2 days later with a platelet of over 100,000... Patient admits to taking multiple OTC meds, including zinc, a powder to make her hair grow, Biotene, etc.

## 2021-07-01 ENCOUNTER — TRANSCRIPTION ENCOUNTER (OUTPATIENT)
Age: 61
End: 2021-07-01

## 2021-07-01 ENCOUNTER — APPOINTMENT (OUTPATIENT)
Dept: INTERNAL MEDICINE | Facility: CLINIC | Age: 61
End: 2021-07-01
Payer: COMMERCIAL

## 2021-07-01 ENCOUNTER — APPOINTMENT (OUTPATIENT)
Dept: INTERNAL MEDICINE | Facility: CLINIC | Age: 61
End: 2021-07-01

## 2021-07-01 VITALS
DIASTOLIC BLOOD PRESSURE: 76 MMHG | BODY MASS INDEX: 33.86 KG/M2 | SYSTOLIC BLOOD PRESSURE: 105 MMHG | OXYGEN SATURATION: 98 % | WEIGHT: 184 LBS | RESPIRATION RATE: 14 BRPM | TEMPERATURE: 97.3 F | HEIGHT: 62 IN | HEART RATE: 80 BPM

## 2021-07-01 DIAGNOSIS — F32.9 MAJOR DEPRESSIVE DISORDER, SINGLE EPISODE, UNSPECIFIED: ICD-10-CM

## 2021-07-01 DIAGNOSIS — L52 ERYTHEMA NODOSUM: ICD-10-CM

## 2021-07-01 PROCEDURE — 99072 ADDL SUPL MATRL&STAF TM PHE: CPT

## 2021-07-01 PROCEDURE — 99204 OFFICE O/P NEW MOD 45 MIN: CPT | Mod: GC

## 2021-07-02 DIAGNOSIS — E87.0 HYPEROSMOLALITY AND HYPERNATREMIA: ICD-10-CM

## 2021-07-02 DIAGNOSIS — F32.9 MAJOR DEPRESSIVE DISORDER, SINGLE EPISODE, UNSPECIFIED: ICD-10-CM

## 2021-07-02 DIAGNOSIS — Z88.0 ALLERGY STATUS TO PENICILLIN: ICD-10-CM

## 2021-07-02 DIAGNOSIS — E03.9 HYPOTHYROIDISM, UNSPECIFIED: ICD-10-CM

## 2021-07-02 DIAGNOSIS — M19.90 UNSPECIFIED OSTEOARTHRITIS, UNSPECIFIED SITE: ICD-10-CM

## 2021-07-02 DIAGNOSIS — H35.30 UNSPECIFIED MACULAR DEGENERATION: ICD-10-CM

## 2021-07-02 DIAGNOSIS — E78.5 HYPERLIPIDEMIA, UNSPECIFIED: ICD-10-CM

## 2021-07-02 DIAGNOSIS — R51.9 HEADACHE, UNSPECIFIED: ICD-10-CM

## 2021-07-02 DIAGNOSIS — D69.3 IMMUNE THROMBOCYTOPENIC PURPURA: ICD-10-CM

## 2021-07-02 DIAGNOSIS — D72.810 LYMPHOCYTOPENIA: ICD-10-CM

## 2021-07-02 DIAGNOSIS — R53.1 WEAKNESS: ICD-10-CM

## 2021-07-02 PROBLEM — L52 ERYTHEMA NODOSUM: Status: ACTIVE | Noted: 2021-06-30

## 2021-07-03 ENCOUNTER — TRANSCRIPTION ENCOUNTER (OUTPATIENT)
Age: 61
End: 2021-07-03

## 2021-07-03 DIAGNOSIS — E55.9 VITAMIN D DEFICIENCY, UNSPECIFIED: ICD-10-CM

## 2021-07-03 RX ORDER — ERGOCALCIFEROL 1.25 MG/1
1.25 MG CAPSULE ORAL
Qty: 4 | Refills: 0 | Status: ACTIVE | COMMUNITY
Start: 2021-07-03 | End: 1900-01-01

## 2021-07-04 ENCOUNTER — EMERGENCY (EMERGENCY)
Facility: HOSPITAL | Age: 61
LOS: 1 days | Discharge: HOME | End: 2021-07-04
Attending: EMERGENCY MEDICINE | Admitting: EMERGENCY MEDICINE
Payer: COMMERCIAL

## 2021-07-04 VITALS
SYSTOLIC BLOOD PRESSURE: 127 MMHG | OXYGEN SATURATION: 98 % | DIASTOLIC BLOOD PRESSURE: 90 MMHG | HEART RATE: 81 BPM | TEMPERATURE: 98 F | RESPIRATION RATE: 18 BRPM

## 2021-07-04 VITALS
SYSTOLIC BLOOD PRESSURE: 147 MMHG | TEMPERATURE: 99 F | HEIGHT: 63 IN | RESPIRATION RATE: 16 BRPM | WEIGHT: 182.98 LBS | OXYGEN SATURATION: 97 % | HEART RATE: 81 BPM | DIASTOLIC BLOOD PRESSURE: 86 MMHG

## 2021-07-04 DIAGNOSIS — D69.6 THROMBOCYTOPENIA, UNSPECIFIED: ICD-10-CM

## 2021-07-04 DIAGNOSIS — E78.5 HYPERLIPIDEMIA, UNSPECIFIED: ICD-10-CM

## 2021-07-04 DIAGNOSIS — Z20.822 CONTACT WITH AND (SUSPECTED) EXPOSURE TO COVID-19: ICD-10-CM

## 2021-07-04 DIAGNOSIS — Z88.1 ALLERGY STATUS TO OTHER ANTIBIOTIC AGENTS STATUS: ICD-10-CM

## 2021-07-04 DIAGNOSIS — Z88.0 ALLERGY STATUS TO PENICILLIN: ICD-10-CM

## 2021-07-04 DIAGNOSIS — E03.9 HYPOTHYROIDISM, UNSPECIFIED: ICD-10-CM

## 2021-07-04 LAB
ALBUMIN SERPL ELPH-MCNC: 3.5 G/DL — SIGNIFICANT CHANGE UP (ref 3.3–5)
ALP SERPL-CCNC: 83 U/L — SIGNIFICANT CHANGE UP (ref 40–120)
ALT FLD-CCNC: 20 U/L — SIGNIFICANT CHANGE UP (ref 10–45)
ANION GAP SERPL CALC-SCNC: 7 MMOL/L — SIGNIFICANT CHANGE UP (ref 5–17)
APTT BLD: 24 SEC — LOW (ref 27.5–35.5)
AST SERPL-CCNC: 33 U/L — SIGNIFICANT CHANGE UP (ref 10–40)
BASOPHILS # BLD AUTO: 0.1 K/UL — SIGNIFICANT CHANGE UP (ref 0–0.2)
BASOPHILS NFR BLD AUTO: 2.2 % — HIGH (ref 0–2)
BILIRUB SERPL-MCNC: 0.4 MG/DL — SIGNIFICANT CHANGE UP (ref 0.2–1.2)
BLD GP AB SCN SERPL QL: NEGATIVE — SIGNIFICANT CHANGE UP
BUN SERPL-MCNC: 13 MG/DL — SIGNIFICANT CHANGE UP (ref 7–23)
CALCIUM SERPL-MCNC: 8.8 MG/DL — SIGNIFICANT CHANGE UP (ref 8.4–10.5)
CHLORIDE SERPL-SCNC: 104 MMOL/L — SIGNIFICANT CHANGE UP (ref 96–108)
CO2 SERPL-SCNC: 24 MMOL/L — SIGNIFICANT CHANGE UP (ref 22–31)
CREAT SERPL-MCNC: 1.01 MG/DL — SIGNIFICANT CHANGE UP (ref 0.5–1.3)
EOSINOPHIL # BLD AUTO: 0.14 K/UL — SIGNIFICANT CHANGE UP (ref 0–0.5)
EOSINOPHIL NFR BLD AUTO: 3.1 % — SIGNIFICANT CHANGE UP (ref 0–6)
GLUCOSE SERPL-MCNC: 93 MG/DL — SIGNIFICANT CHANGE UP (ref 70–99)
HCT VFR BLD CALC: 36.7 % — SIGNIFICANT CHANGE UP (ref 34.5–45)
HEMATOPATHOLOGY REPORT: SIGNIFICANT CHANGE UP
HGB BLD-MCNC: 12.5 G/DL — SIGNIFICANT CHANGE UP (ref 11.5–15.5)
IMM GRANULOCYTES NFR BLD AUTO: 0.4 % — SIGNIFICANT CHANGE UP (ref 0–1.5)
INR BLD: 1.04 — SIGNIFICANT CHANGE UP (ref 0.88–1.16)
LYMPHOCYTES # BLD AUTO: 1.83 K/UL — SIGNIFICANT CHANGE UP (ref 1–3.3)
LYMPHOCYTES # BLD AUTO: 40.5 % — SIGNIFICANT CHANGE UP (ref 13–44)
MCHC RBC-ENTMCNC: 32.5 PG — SIGNIFICANT CHANGE UP (ref 27–34)
MCHC RBC-ENTMCNC: 34.1 GM/DL — SIGNIFICANT CHANGE UP (ref 32–36)
MCV RBC AUTO: 95.3 FL — SIGNIFICANT CHANGE UP (ref 80–100)
MONOCYTES # BLD AUTO: 0.62 K/UL — SIGNIFICANT CHANGE UP (ref 0–0.9)
MONOCYTES NFR BLD AUTO: 13.7 % — SIGNIFICANT CHANGE UP (ref 2–14)
NEUTROPHILS # BLD AUTO: 1.81 K/UL — SIGNIFICANT CHANGE UP (ref 1.8–7.4)
NEUTROPHILS NFR BLD AUTO: 40.1 % — LOW (ref 43–77)
NRBC # BLD: 0 /100 WBCS — SIGNIFICANT CHANGE UP (ref 0–0)
PLATELET # BLD AUTO: 15 K/UL — CRITICAL LOW (ref 150–400)
POTASSIUM SERPL-MCNC: 4.3 MMOL/L — SIGNIFICANT CHANGE UP (ref 3.5–5.3)
POTASSIUM SERPL-SCNC: 4.3 MMOL/L — SIGNIFICANT CHANGE UP (ref 3.5–5.3)
PROT SERPL-MCNC: 7.7 G/DL — SIGNIFICANT CHANGE UP (ref 6–8.3)
PROTHROM AB SERPL-ACNC: 12.4 SEC — SIGNIFICANT CHANGE UP (ref 10.6–13.6)
RBC # BLD: 3.85 M/UL — SIGNIFICANT CHANGE UP (ref 3.8–5.2)
RBC # FLD: 11.9 % — SIGNIFICANT CHANGE UP (ref 10.3–14.5)
RH IG SCN BLD-IMP: POSITIVE — SIGNIFICANT CHANGE UP
SARS-COV-2 RNA SPEC QL NAA+PROBE: NEGATIVE — SIGNIFICANT CHANGE UP
SODIUM SERPL-SCNC: 135 MMOL/L — SIGNIFICANT CHANGE UP (ref 135–145)
WBC # BLD: 4.52 K/UL — SIGNIFICANT CHANGE UP (ref 3.8–10.5)
WBC # FLD AUTO: 4.52 K/UL — SIGNIFICANT CHANGE UP (ref 3.8–10.5)

## 2021-07-04 PROCEDURE — 80053 COMPREHEN METABOLIC PANEL: CPT

## 2021-07-04 PROCEDURE — 36415 COLL VENOUS BLD VENIPUNCTURE: CPT

## 2021-07-04 PROCEDURE — 99284 EMERGENCY DEPT VISIT MOD MDM: CPT

## 2021-07-04 PROCEDURE — 85610 PROTHROMBIN TIME: CPT

## 2021-07-04 PROCEDURE — 86901 BLOOD TYPING SEROLOGIC RH(D): CPT

## 2021-07-04 PROCEDURE — 86850 RBC ANTIBODY SCREEN: CPT

## 2021-07-04 PROCEDURE — 87635 SARS-COV-2 COVID-19 AMP PRB: CPT

## 2021-07-04 PROCEDURE — 99283 EMERGENCY DEPT VISIT LOW MDM: CPT

## 2021-07-04 PROCEDURE — 85730 THROMBOPLASTIN TIME PARTIAL: CPT

## 2021-07-04 PROCEDURE — 85025 COMPLETE CBC W/AUTO DIFF WBC: CPT

## 2021-07-04 PROCEDURE — 86900 BLOOD TYPING SEROLOGIC ABO: CPT

## 2021-07-04 RX ORDER — DEXAMETHASONE 0.5 MG/5ML
20 ELIXIR ORAL ONCE
Refills: 0 | Status: COMPLETED | OUTPATIENT
Start: 2021-07-04 | End: 2021-07-04

## 2021-07-04 RX ORDER — DEXAMETHASONE 0.5 MG/5ML
5 ELIXIR ORAL
Qty: 15 | Refills: 0
Start: 2021-07-04 | End: 2021-07-06

## 2021-07-04 RX ADMIN — Medication 20 MILLIGRAM(S): at 20:44

## 2021-07-04 NOTE — ED PROVIDER NOTE - PATIENT PORTAL LINK FT
You can access the FollowMyHealth Patient Portal offered by [facility] by registering at the following website: [email or web address]. By joining [employer]’s FollowMyHealth portal, you will also be able to view your health information using other applications (apps) compatible with our system.

## 2021-07-04 NOTE — ED PROVIDER NOTE - OBJECTIVE STATEMENT
60 y/o F w/ PMHx hyperlipidemia, hypothyroidism, admitted in June for possible ITP, treated w/ steroids and IVIG. Presents to the ED due to low platelets found in outpatient labs, advised by  to come for evaluation. Denies headache, head trauma, dizziness, lightheadedness, fevers, chills, SOB, chest pain, black or bloody stools, upper GI or lower GI bleeding, bruising. 62 y/o F w/ PMHx hyperlipidemia, hypothyroidism, admitted in June for possible ITP, treated w/ steroids and IVIG. Presents to the ED due to low platelets found in outpatient labs, advised by rheum  to come for evaluation. Denies headache, head trauma, dizziness, lightheadedness, fevers, chills, SOB, chest pain, black or bloody stools, upper GI or lower GI bleeding, bruising.

## 2021-07-04 NOTE — ED PROVIDER NOTE - PHYSICAL EXAMINATION
CONSTITUTIONAL: Awake, alert and in no apparent distress.  HEENT: Head is atraumatic. Eyes clear bilaterally, normal EOMI. Airway patent.  CARDIAC: Normal rate, regular rhythm.  Heart sounds S1, S2.   RESPIRATORY: Breath sounds clear and equal bilaterally. no tachypnea, respiratory distress.   GASTROINTESTINAL: Abdomen soft, non-tender, no guarding, distension.  MUSCULOSKELETAL: Spine appears normal, no midline spinal tenderness, range of motion is not limited, no muscle or joint tenderness. no bony tenderness.   NEUROLOGICAL: Alert, no focal deficits, no motor or sensory deficits.  SKIN: Diffuse ecchymosis to skin. Skin normal color for race, warm, dry and intact. No evidence of rash.  PSYCHIATRIC: Normal mood and affect. no apparent risk to self or others.

## 2021-07-04 NOTE — ED ADULT NURSE NOTE - OBJECTIVE STATEMENT
Pt is a 60 y/o female A&Ox4 in NAD ambulatory with steady gait sent in by Md for low platelet level of "11,000." Pt reports was admitted 2 weeks ago for same c/o, had repeat blood draw on 7/2. Pt denies chest pain, SOB, N/V/D, fever/chills. Pt talking in clear, full sentences, respirations even and unlabored.

## 2021-07-04 NOTE — ED ADULT TRIAGE NOTE - CHIEF COMPLAINT QUOTE
pt states "I got a call from my doctor Sudhakar Daly that my platelets are 11,000 on labs drawn on 7/2" Pt denies CP SOB abd pain n/v weakness. denies any recent falls head trauma reports recent admission for platelet transfusion

## 2021-07-04 NOTE — ED PROVIDER NOTE - CLINICAL SUMMARY MEDICAL DECISION MAKING FREE TEXT BOX
60 y/o F w/ low platelets. No fever, no bleeding ,no head trauma. Concern for reoccurrence of ITP. Discussed w/ Dr. Loaiza, will d/c w/ oral Dexamethasone, advice to give 3 more days of Dexamethasone. 62 y/o F w/ low platelets. No fever, no bleeding ,no head trauma. Concern for reoccurrence of ITP. Discussed numbers w/ Dr. Loaiza, priscila d/c w/ oral Dexamethasone, advice to give 3 more days of Dexamethasone.

## 2021-07-05 ENCOUNTER — INPATIENT (INPATIENT)
Facility: HOSPITAL | Age: 61
LOS: 2 days | Discharge: HOME | DRG: 813 | End: 2021-07-08
Attending: INTERNAL MEDICINE | Admitting: HOSPITALIST
Payer: COMMERCIAL

## 2021-07-05 VITALS
RESPIRATION RATE: 18 BRPM | WEIGHT: 182.98 LBS | DIASTOLIC BLOOD PRESSURE: 99 MMHG | OXYGEN SATURATION: 99 % | HEIGHT: 63 IN | SYSTOLIC BLOOD PRESSURE: 134 MMHG | HEART RATE: 85 BPM | TEMPERATURE: 98 F

## 2021-07-05 DIAGNOSIS — E78.5 HYPERLIPIDEMIA, UNSPECIFIED: ICD-10-CM

## 2021-07-05 DIAGNOSIS — F32.9 MAJOR DEPRESSIVE DISORDER, SINGLE EPISODE, UNSPECIFIED: ICD-10-CM

## 2021-07-05 DIAGNOSIS — D69.6 THROMBOCYTOPENIA, UNSPECIFIED: ICD-10-CM

## 2021-07-05 DIAGNOSIS — R63.8 OTHER SYMPTOMS AND SIGNS CONCERNING FOOD AND FLUID INTAKE: ICD-10-CM

## 2021-07-05 DIAGNOSIS — Z86.2 PERSONAL HISTORY OF DISEASES OF THE BLOOD AND BLOOD-FORMING ORGANS AND CERTAIN DISORDERS INVOLVING THE IMMUNE MECHANISM: ICD-10-CM

## 2021-07-05 DIAGNOSIS — M19.90 UNSPECIFIED OSTEOARTHRITIS, UNSPECIFIED SITE: ICD-10-CM

## 2021-07-05 DIAGNOSIS — E03.9 HYPOTHYROIDISM, UNSPECIFIED: ICD-10-CM

## 2021-07-05 LAB
ALBUMIN SERPL ELPH-MCNC: 3.8 G/DL — SIGNIFICANT CHANGE UP (ref 3.3–5)
ALP SERPL-CCNC: 77 U/L — SIGNIFICANT CHANGE UP (ref 40–120)
ALT FLD-CCNC: 27 U/L — SIGNIFICANT CHANGE UP (ref 10–45)
ANION GAP SERPL CALC-SCNC: 12 MMOL/L — SIGNIFICANT CHANGE UP (ref 5–17)
APTT BLD: 21.6 SEC — LOW (ref 27.5–35.5)
AST SERPL-CCNC: 42 U/L — HIGH (ref 10–40)
BASOPHILS # BLD AUTO: 0.03 K/UL — SIGNIFICANT CHANGE UP (ref 0–0.2)
BASOPHILS NFR BLD AUTO: 0.3 % — SIGNIFICANT CHANGE UP (ref 0–2)
BILIRUB SERPL-MCNC: 0.5 MG/DL — SIGNIFICANT CHANGE UP (ref 0.2–1.2)
BLD GP AB SCN SERPL QL: NEGATIVE — SIGNIFICANT CHANGE UP
BUN SERPL-MCNC: 16 MG/DL — SIGNIFICANT CHANGE UP (ref 7–23)
CALCIUM SERPL-MCNC: 9.2 MG/DL — SIGNIFICANT CHANGE UP (ref 8.4–10.5)
CHLORIDE SERPL-SCNC: 109 MMOL/L — HIGH (ref 96–108)
CO2 SERPL-SCNC: 19 MMOL/L — LOW (ref 22–31)
CREAT SERPL-MCNC: 0.89 MG/DL — SIGNIFICANT CHANGE UP (ref 0.5–1.3)
CRP SERPL-MCNC: 4.5 MG/L — HIGH (ref 0–4)
EOSINOPHIL # BLD AUTO: 0 K/UL — SIGNIFICANT CHANGE UP (ref 0–0.5)
EOSINOPHIL NFR BLD AUTO: 0 % — SIGNIFICANT CHANGE UP (ref 0–6)
ERYTHROCYTE [SEDIMENTATION RATE] IN BLOOD: 59 MM/HR — HIGH
GLUCOSE SERPL-MCNC: 107 MG/DL — HIGH (ref 70–99)
HCT VFR BLD CALC: 39.8 % — SIGNIFICANT CHANGE UP (ref 34.5–45)
HGB BLD-MCNC: 13.6 G/DL — SIGNIFICANT CHANGE UP (ref 11.5–15.5)
IMM GRANULOCYTES NFR BLD AUTO: 0.6 % — SIGNIFICANT CHANGE UP (ref 0–1.5)
INR BLD: 1.04 — SIGNIFICANT CHANGE UP (ref 0.88–1.16)
LYMPHOCYTES # BLD AUTO: 1 K/UL — SIGNIFICANT CHANGE UP (ref 1–3.3)
LYMPHOCYTES # BLD AUTO: 10.3 % — LOW (ref 13–44)
MCHC RBC-ENTMCNC: 31.7 PG — SIGNIFICANT CHANGE UP (ref 27–34)
MCHC RBC-ENTMCNC: 34.2 GM/DL — SIGNIFICANT CHANGE UP (ref 32–36)
MCV RBC AUTO: 92.8 FL — SIGNIFICANT CHANGE UP (ref 80–100)
MONOCYTES # BLD AUTO: 0.63 K/UL — SIGNIFICANT CHANGE UP (ref 0–0.9)
MONOCYTES NFR BLD AUTO: 6.5 % — SIGNIFICANT CHANGE UP (ref 2–14)
NEUTROPHILS # BLD AUTO: 8.03 K/UL — HIGH (ref 1.8–7.4)
NEUTROPHILS NFR BLD AUTO: 82.3 % — HIGH (ref 43–77)
NRBC # BLD: 0 /100 WBCS — SIGNIFICANT CHANGE UP (ref 0–0)
PLATELET # BLD AUTO: 4 K/UL — CRITICAL LOW (ref 150–400)
POTASSIUM SERPL-MCNC: 4.6 MMOL/L — SIGNIFICANT CHANGE UP (ref 3.5–5.3)
POTASSIUM SERPL-SCNC: 4.6 MMOL/L — SIGNIFICANT CHANGE UP (ref 3.5–5.3)
PROT SERPL-MCNC: 8.3 G/DL — SIGNIFICANT CHANGE UP (ref 6–8.3)
PROTHROM AB SERPL-ACNC: 12.4 SEC — SIGNIFICANT CHANGE UP (ref 10.6–13.6)
RBC # BLD: 4.29 M/UL — SIGNIFICANT CHANGE UP (ref 3.8–5.2)
RBC # FLD: 11.9 % — SIGNIFICANT CHANGE UP (ref 10.3–14.5)
RH IG SCN BLD-IMP: POSITIVE — SIGNIFICANT CHANGE UP
SARS-COV-2 RNA SPEC QL NAA+PROBE: NEGATIVE — SIGNIFICANT CHANGE UP
SODIUM SERPL-SCNC: 140 MMOL/L — SIGNIFICANT CHANGE UP (ref 135–145)
WBC # BLD: 9.75 K/UL — SIGNIFICANT CHANGE UP (ref 3.8–10.5)
WBC # FLD AUTO: 9.75 K/UL — SIGNIFICANT CHANGE UP (ref 3.8–10.5)

## 2021-07-05 PROCEDURE — 99223 1ST HOSP IP/OBS HIGH 75: CPT | Mod: GC

## 2021-07-05 PROCEDURE — 99285 EMERGENCY DEPT VISIT HI MDM: CPT

## 2021-07-05 RX ORDER — GLUCAGON INJECTION, SOLUTION 0.5 MG/.1ML
1 INJECTION, SOLUTION SUBCUTANEOUS ONCE
Refills: 0 | Status: DISCONTINUED | OUTPATIENT
Start: 2021-07-05 | End: 2021-07-08

## 2021-07-05 RX ORDER — DEXTROSE 50 % IN WATER 50 %
25 SYRINGE (ML) INTRAVENOUS ONCE
Refills: 0 | Status: DISCONTINUED | OUTPATIENT
Start: 2021-07-05 | End: 2021-07-08

## 2021-07-05 RX ORDER — DEXAMETHASONE 0.5 MG/5ML
40 ELIXIR ORAL ONCE
Refills: 0 | Status: COMPLETED | OUTPATIENT
Start: 2021-07-05 | End: 2021-07-05

## 2021-07-05 RX ORDER — INSULIN LISPRO 100/ML
VIAL (ML) SUBCUTANEOUS
Refills: 0 | Status: DISCONTINUED | OUTPATIENT
Start: 2021-07-05 | End: 2021-07-06

## 2021-07-05 RX ORDER — IMMUNE GLOBULIN (HUMAN) 10 G/100ML
65 INJECTION INTRAVENOUS; SUBCUTANEOUS ONCE
Refills: 0 | Status: COMPLETED | OUTPATIENT
Start: 2021-07-06 | End: 2021-07-06

## 2021-07-05 RX ORDER — DEXTROSE 50 % IN WATER 50 %
12.5 SYRINGE (ML) INTRAVENOUS ONCE
Refills: 0 | Status: DISCONTINUED | OUTPATIENT
Start: 2021-07-05 | End: 2021-07-08

## 2021-07-05 RX ORDER — SERTRALINE 25 MG/1
50 TABLET, FILM COATED ORAL DAILY
Refills: 0 | Status: DISCONTINUED | OUTPATIENT
Start: 2021-07-05 | End: 2021-07-08

## 2021-07-05 RX ORDER — DIPHENHYDRAMINE HCL 50 MG
50 CAPSULE ORAL ONCE
Refills: 0 | Status: COMPLETED | OUTPATIENT
Start: 2021-07-05 | End: 2021-07-05

## 2021-07-05 RX ORDER — LEVOTHYROXINE SODIUM 125 MCG
125 TABLET ORAL
Refills: 0 | Status: DISCONTINUED | OUTPATIENT
Start: 2021-07-05 | End: 2021-07-08

## 2021-07-05 RX ORDER — PANTOPRAZOLE SODIUM 20 MG/1
40 TABLET, DELAYED RELEASE ORAL
Refills: 0 | Status: DISCONTINUED | OUTPATIENT
Start: 2021-07-05 | End: 2021-07-08

## 2021-07-05 RX ORDER — DEXAMETHASONE 0.5 MG/5ML
40 ELIXIR ORAL EVERY 24 HOURS
Refills: 0 | Status: COMPLETED | OUTPATIENT
Start: 2021-07-06 | End: 2021-07-07

## 2021-07-05 RX ORDER — SODIUM CHLORIDE 9 MG/ML
1000 INJECTION, SOLUTION INTRAVENOUS
Refills: 0 | Status: DISCONTINUED | OUTPATIENT
Start: 2021-07-05 | End: 2021-07-08

## 2021-07-05 RX ORDER — IMMUNE GLOBULIN (HUMAN) 10 G/100ML
65 INJECTION INTRAVENOUS; SUBCUTANEOUS ONCE
Refills: 0 | Status: COMPLETED | OUTPATIENT
Start: 2021-07-05 | End: 2021-07-05

## 2021-07-05 RX ORDER — INSULIN LISPRO 100/ML
VIAL (ML) SUBCUTANEOUS AT BEDTIME
Refills: 0 | Status: DISCONTINUED | OUTPATIENT
Start: 2021-07-05 | End: 2021-07-08

## 2021-07-05 RX ORDER — ACETAMINOPHEN 500 MG
650 TABLET ORAL ONCE
Refills: 0 | Status: COMPLETED | OUTPATIENT
Start: 2021-07-05 | End: 2021-07-05

## 2021-07-05 RX ORDER — LEVOTHYROXINE SODIUM 125 MCG
1 TABLET ORAL
Qty: 0 | Refills: 0 | DISCHARGE

## 2021-07-05 RX ORDER — LEVOTHYROXINE SODIUM 125 MCG
137 TABLET ORAL
Refills: 0 | Status: DISCONTINUED | OUTPATIENT
Start: 2021-07-05 | End: 2021-07-08

## 2021-07-05 RX ORDER — DEXTROSE 50 % IN WATER 50 %
15 SYRINGE (ML) INTRAVENOUS ONCE
Refills: 0 | Status: DISCONTINUED | OUTPATIENT
Start: 2021-07-05 | End: 2021-07-08

## 2021-07-05 RX ADMIN — Medication 650 MILLIGRAM(S): at 18:45

## 2021-07-05 RX ADMIN — IMMUNE GLOBULIN (HUMAN) 162.5 GRAM(S): 10 INJECTION INTRAVENOUS; SUBCUTANEOUS at 19:25

## 2021-07-05 RX ADMIN — Medication 108 MILLIGRAM(S): at 18:06

## 2021-07-05 RX ADMIN — Medication 50 MILLIGRAM(S): at 18:45

## 2021-07-05 NOTE — H&P ADULT - PROBLEM SELECTOR PLAN 5
Home med: Ezetimibe 10  -hold as interchange in hospital is statin and patient with intolerable side effects to statin

## 2021-07-05 NOTE — ED PROVIDER NOTE - CLINICAL SUMMARY MEDICAL DECISION MAKING FREE TEXT BOX
ITP with bruising to extremities. no active bleeding. no ha. vss. labs noted. plt 4. case d/w heme fellow and will given IVIG, dexamethasone and plan for admission

## 2021-07-05 NOTE — H&P ADULT - NSICDXPASTMEDICALHX_GEN_ALL_CORE_FT
PAST MEDICAL HISTORY:  Arthritis     History of ITP     HLD (hyperlipidemia)     Hypothyroidism

## 2021-07-05 NOTE — H&P ADULT - HISTORY OF PRESENT ILLNESS
61F PMH ITP (Dx in June 2021 rx'ed w/ Steroids/IVIG), Hypothyroidism, HLD, Depression, arthritis, and macular degeneration presents from outpt Hematologist office w/ Plt count drop from 15k on 7/4 to 1k on 7/5. Of note pt was seen at [facility] ED on 7/4 after was found to have Plt<15k on outpt labs was dc'ed home as per recommendation of hematologist,  on Decadron x 3 days and outpt Hem f/up. Pt notes multiple large  bruising to b/l UE but has not increased over the past few days. Pt also suffers from long standing fatigue and diffuse joint stiffness, worse in back, knees and shoulder, that occurs in the morning and eventually improves after a couple of hours. On ROS denies fever, night sweats, gum bleeding, rash, joint swelling, chest pain, palpitation, SOB, abdominal pain, any recent illness, travel hx  and dysuria. Pt seen by H/O in the ED recommended IVIG and Steroids. Admitted to medicine for management of ITP.   Pt was admitted at [facility] in June and had extensive autoimmune/infx/Hem w/up that was grossly unremarkable and was instructed to f/u w/ Rheum and Hem as outpt. F/up w/ Rheum at [facility]    ED Course:   VS: 98.3 85 134/99 99% RA  Labs: Plt 4; PTT 21.6; Cl 109 HCO3 19 AST 42  Imaging: None   Rx: Decadron 40 IV X1; Benadryl IVIG X1   61F PMH ITP (Dx in June 2021 rx'ed w/ Steroids/IVIG), Hypothyroidism, HLD, Depression, arthritis, and macular degeneration presents from outpt Hematologist office w/ Plt count drop from 15k on 7/4 to 1k on 7/5. Of note pt was seen at [facility] ED on 7/4 after was found to have Plt<15k on outpt labs was dc'ed home as per recommendation of hematologist,  on Decadron x 3 days and outpt Hem f/up. Pt notes multiple large  bruising to b/l UE but has not increased over the past few days. Pt also suffers from long standing fatigue and diffuse joint stiffness, worse in back, knees and shoulder, that occurs in the morning and eventually improves after a couple of hours. On ROS denies fever, night sweats, gum bleeding, rash, joint swelling, chest pain, palpitation, SOB, abdominal pain, any recent illness, travel hx  and dysuria. Pt seen by H/O in the ED recommended IVIG and Steroids. Admitted to medicine for management of ITP.   Pt was admitted at [facility] in June and had extensive autoimmune/infx/Hem w/up that was grossly unremarkable and was instructed to f/u w/ Rheum and Hem as outpt. F/up w/ Rheum at [facility]    ED Course:   VS: 98.3 85 134/99 99% RA  Labs: Plt 4; PTT 21.6; Cl 109 HCO3 19 AST 42  Imaging: None   Rx: Decadron 40 IV X1; Benadryl IVIG X1    Outpt Hem Dr. Quiñones, Rheum Dr. Foster at [facility]

## 2021-07-05 NOTE — PROGRESS NOTE ADULT - SUBJECTIVE AND OBJECTIVE BOX
Discussed case with ED provider.  Patient to be admitted to Medicine for management of ITP   Patient visited ED yesterday after outpatient labs revealed significant drop in platelets.   Was discharged from ED after PO Dexamethasone and at a follow up with [facility] hematology was found to have platelet count of 1 today (from 15k yesterday)  Recommend IVIG 1g/kg/d x2 days and 40 mg Dexamethasone x 4 days (Patient has already received dexamethasone from ED visit yesterday and only administer remaining doses)  Administer remaining Dexamethasone intravenously  Full consult note to follow in am    To D/w Service Hematology Dr. Mercado     Discussed case with ED provider.  Patient to be admitted to Medicine for management of ITP   Patient visited ED yesterday after outpatient labs revealed significant drop in platelets.   Was discharged from ED after PO Dexamethasone and at a follow up with [facility] hematology was found to have platelet count of 1k today (from 15k yesterday)  Recommend IVIG 1g/kg/d x2 days and 40 mg Dexamethasone x 4 days (Patient has already received dexamethasone from ED visit yesterday and only administer remaining doses)  Administer remaining Dexamethasone intravenously  Full consult note to follow in am    To D/w Service Hematology Dr. Mercado

## 2021-07-05 NOTE — H&P ADULT - ASSESSMENT
61F PMH ITP (Dx in June 2021 rx'ed w/ Steroids/IVIG), Hypothyroidism, HLD, Depression, arthritis, and macular degeneration presents from outpt Hematologist office for acute drop in Plt count (15k ->1k in 24hrs).  Admitted for management of ITP

## 2021-07-05 NOTE — ED PROVIDER NOTE - PHYSICAL EXAMINATION
multiple large bruises present to b/l UE with multiple areas of petechia to b/l UE. nodular lesion present to shin LLE. no bruising to trunk.

## 2021-07-05 NOTE — ED PROVIDER NOTE - ATTENDING CONTRIBUTION TO CARE
Pt w prior treatement for presumed ITP, referred to ED for low platelets. Seen yesterday by myself, on discussion with Dr. Loaiza and taking into account plt ct yesterday, no new acute complaints, recommended me to dc on decadron, given first dose in ED. pt returned today for lower numbers, no acute bleeding complaints, headache, new bruising. rpt platelet count four, will admit for ivig and steroids disc w hemeonc.

## 2021-07-05 NOTE — H&P ADULT - NSHPPHYSICALEXAM_GEN_ALL_CORE
VITAL SIGNS:  T(C): 36.8 (07-05-21 @ 20:29), Max: 36.8 (07-04-21 @ 21:48)  T(F): 98.2 (07-05-21 @ 20:29), Max: 98.3 (07-04-21 @ 21:48)  HR: 80 (07-05-21 @ 20:29) (58 - 85)  BP: 127/84 (07-05-21 @ 20:29) (114/78 - 160/95)  BP(mean): --  RR: 18 (07-05-21 @ 20:29) (18 - 18)  SpO2: 98% (07-05-21 @ 20:29) (95% - 99%)  Wt(kg): --    PHYSICAL EXAM:  Constitutional: NAD  Respiratory: CTA B/L; no W/R/R, no retractions  Cardiac: +S1/S2; RRR; no M/R/G   Gastrointestinal: abdomen soft, NT/ND; no rebound or guarding; +BSx4  Extremities: multiple large bruises present to b/l UE with multiple areas of petechia to b/l UE. Nodular lesion present to shin LLE. No bruising to trunk. WWP, no clubbing or cyanosis; no peripheral edema  Vascular: 2+ radial, femoral, DP/PT pulses B/L  Dermatologic: multiple large bruises present to b/l UE with multiple areas of petechia to b/l UE. Nodular lesion present to shin LL  Lymphatic: no submandibular or cervical LAD  Neurologic: AAOx3; no focal deficits

## 2021-07-05 NOTE — PATIENT PROFILE ADULT - DATE OF LAST VACCINATION
Last 5 Patient Entered Readings                                                               Current 30 Day Average: 118/83     Recent Readings 3/13/2017 3/12/2017 3/9/2017 3/8/2017 3/6/2017    Systolic BP (mmHg) 108 124 118 116 116    Diastolic BP (mmHg) 77 81 84 87 83    Pulse 85 61 63 66 69        Hypertension Medications             hydrochlorothiazide (MICROZIDE) 12.5 mg capsule Take 1 capsule (12.5 mg total) by mouth once daily.        Called patient to introduce her into the HDM (hypertension digital medicine) clinic. LVM.   Will continue to monitor.  WCB in 1 week.      09-Apr-2021

## 2021-07-05 NOTE — ED PROVIDER NOTE - OBJECTIVE STATEMENT
60 y/o F w/ PMHx hyperlipidemia, hypothyroidism, admitted in June for possible ITP, treated w/ steroids and IVIG c/o low plt count. pt states seen her yesterday and plt 15. pt d/c home as per recommendation of hematologist with decadron. pt reports saw another hematologist today and plt count in offce was <1. pt notes bruising to extremities but has not increased over past few days. no ha or dizziness. no visual changes. no neck or back pain. no abd pain, n/v. no hematuria. no blood in stool. no further complaints.

## 2021-07-05 NOTE — H&P ADULT - NSHPLABSRESULTS_GEN_ALL_CORE
.  LABS:                         13.6   9.75  )-----------( 4        ( 05 Jul 2021 17:09 )             39.8     07-05    140  |  109<H>  |  16  ----------------------------<  107<H>  4.6   |  19<L>  |  0.89    Ca    9.2      05 Jul 2021 17:09    TPro  8.3  /  Alb  3.8  /  TBili  0.5  /  DBili  x   /  AST  42<H>  /  ALT  27  /  AlkPhos  77  07-05    PT/INR - ( 05 Jul 2021 17:09 )   PT: 12.4 sec;   INR: 1.04          PTT - ( 05 Jul 2021 17:09 )  PTT:21.6 sec              RADIOLOGY, EKG & ADDITIONAL TESTS: Reviewed.

## 2021-07-05 NOTE — ED ADULT TRIAGE NOTE - CHIEF COMPLAINT QUOTE
called back to return to ED for abnormal labs, states platelet count is 1. reports bruising on arms. denies any previous histroy of platelet issues.

## 2021-07-05 NOTE — H&P ADULT - ATTENDING COMMENTS
61F PMH ITP (Dx in June 2021 rx'ed w/ Steroids/IVIG), Hypothyroidism, HLD, Depression, arthritis, and macular degeneration presents from outpt Hematologist office for acute drop in Plt count (15k ->1k in 24hrs).  Admitted for management of ITP     #ITP: Unclear etiology, similar presentation 2 weeks ago thought maybe 2/2 underlying rheum dx; currently undergoing work up. Acute drop in Sonu62r to 4K in ED; per heme/onc no transfusion for now; will need IVIG treatment x2 days and 40 mg Dexamethasone x 4 days (7/4-7/7). f/up AM plt count. while on steroid: mSSI and PPI. f/up ESR/CRP

## 2021-07-05 NOTE — ED ADULT NURSE NOTE - OBJECTIVE STATEMENT
PT is a 62 y/o female A&Ox4 in NAD BIBA sent in by hematologist for platelet level "<1." Pt was seen in ED yesterday for low platelets and discharged on steroids. PT denies chest pain, SOB, h/a, N/V/D. Pt noted to have bruising to b/l upper extremities. PT talking in clear, full sentences, respirations even and unlabored.

## 2021-07-05 NOTE — H&P ADULT - PROBLEM SELECTOR PLAN 1
Presents for acute drop in Plt count from 15k to 1k in 24 hrs in the setting of known ITP, diagnosed in June 2021. Most likely in the setting of ITP flair w/out clear inciting event.   -Pt HD stable w/o clinically sig bleeding. No indication for Plt transfusion at this time  -Pt f/up w/ Hem and Rheum as outpt. Most recent autoimmune/ infx/ Hem w/up on admission in June 2021 unremarkable (ERIN 1: 80; RF-, DsDNA-; HIV -, Hep panel -)  -S/p eval by Hem in the ED w/ recs of IVIG and IV Steroids    Plan:  -IVIG 1g/kg/d x2 days (7/5-7/6) and 40 mg Dexamethasone x 4 days (7/4-7/7)  -f/u US abdomen  -Maintain active T+S, transfuse platelets if < 10K or < 20K if febrile or < 50K if clinically important bleeding  -f/u Hem recs Presents for acute drop in Plt count from 15k to 1k in 24 hrs in the setting of known ITP, diagnosed in June 2021. Most likely in the setting of ITP flair w/out clear inciting event.   -Pt HD stable w/o clinically sig bleeding. No indication for Plt transfusion at this time  -Pt f/up w/ Hem and Rheum as outpt. Most recent autoimmune/ infx/ Hem w/up on admission in June 2021 unremarkable (ERIN 1: 80; RF-, DsDNA-; HIV -, Hep panel -)  -S/p eval by Hem in the ED w/ recs of IVIG and IV Steroids    Plan:  -IVIG 1g/kg/d x2 days (7/5-7/6) and 40 mg Dexamethasone x 4 days (7/4-7/7)  -f/u US abdomen  -f/u ESR, CRP, B12, folate, TSH   -Maintain active T+S, transfuse platelets if < 10K or < 20K if febrile or < 50K if clinically important bleeding  -f/u Hem recs Presents for acute drop in Plt count from 15k to 1k in 24 hrs in the setting of known ITP, diagnosed in June 2021. Most likely in the setting of ITP flair w/out clear inciting event.   -Pt HD stable w/o clinically sig bleeding. No indication for Plt transfusion at this time  -Pt f/up w/ Hem and Rheum as outpt. Most recent autoimmune/ infx/ Hem w/up on admission in June 2021 unremarkable (ERIN 1: 80; RF-, DsDNA-; HIV -, Hep panel -)  -S/p eval by Hem in the ED w/ recs of IVIG and IV Steroids    Plan:  -IVIG 1g/kg/d x2 days (7/5-7/6) and 40 mg Dexamethasone x 4 days (7/4-7/7)   -PPX while on steroid: mSSI and PPI  -f/u US abdomen  -f/u ESR, CRP, B12, folate, TSH   -Maintain active T+S, transfuse platelets if < 10K or < 20K if febrile or < 50K if clinically important bleeding  -f/u Hem recs

## 2021-07-05 NOTE — ED ADULT NURSE NOTE - CAS DISCH ACCOMP BY
Hypoglycemia in setting of diabetes with Insulin use  Unclear if any change in diet recently - found to be hypoglycemic to 50 by EMS - given D10 by EMS and mental status improved  will hold home insulin regimen for now - use ISS and monitor FS  House endocrine called for evaluation Transport

## 2021-07-06 ENCOUNTER — TRANSCRIPTION ENCOUNTER (OUTPATIENT)
Age: 61
End: 2021-07-06

## 2021-07-06 DIAGNOSIS — E78.5 HYPERLIPIDEMIA, UNSPECIFIED: ICD-10-CM

## 2021-07-06 LAB
A1C WITH ESTIMATED AVERAGE GLUCOSE RESULT: 5.1 % — SIGNIFICANT CHANGE UP (ref 4–5.6)
ALBUMIN SERPL ELPH-MCNC: 3.4 G/DL — SIGNIFICANT CHANGE UP (ref 3.3–5)
ALP SERPL-CCNC: 62 U/L — SIGNIFICANT CHANGE UP (ref 40–120)
ALT FLD-CCNC: 25 U/L — SIGNIFICANT CHANGE UP (ref 10–45)
ANION GAP SERPL CALC-SCNC: 9 MMOL/L — SIGNIFICANT CHANGE UP (ref 5–17)
AST SERPL-CCNC: 33 U/L — SIGNIFICANT CHANGE UP (ref 10–40)
B-OH-BUTYR SERPL-SCNC: 0.1 MMOL/L — SIGNIFICANT CHANGE UP
BASOPHILS # BLD AUTO: 0 K/UL — SIGNIFICANT CHANGE UP (ref 0–0.2)
BASOPHILS NFR BLD AUTO: 0 % — SIGNIFICANT CHANGE UP (ref 0–2)
BILIRUB SERPL-MCNC: 0.6 MG/DL — SIGNIFICANT CHANGE UP (ref 0.2–1.2)
BUN SERPL-MCNC: 16 MG/DL — SIGNIFICANT CHANGE UP (ref 7–23)
CALCIUM SERPL-MCNC: 8.8 MG/DL — SIGNIFICANT CHANGE UP (ref 8.4–10.5)
CHLORIDE SERPL-SCNC: 109 MMOL/L — HIGH (ref 96–108)
CO2 SERPL-SCNC: 21 MMOL/L — LOW (ref 22–31)
COVID-19 SPIKE DOMAIN AB INTERP: POSITIVE
COVID-19 SPIKE DOMAIN ANTIBODY RESULT: >250 U/ML — HIGH
CREAT SERPL-MCNC: 0.85 MG/DL — SIGNIFICANT CHANGE UP (ref 0.5–1.3)
EOSINOPHIL # BLD AUTO: 0 K/UL — SIGNIFICANT CHANGE UP (ref 0–0.5)
EOSINOPHIL NFR BLD AUTO: 0 % — SIGNIFICANT CHANGE UP (ref 0–6)
ESTIMATED AVERAGE GLUCOSE: 100 MG/DL — SIGNIFICANT CHANGE UP (ref 68–114)
FOLATE SERPL-MCNC: 9.1 NG/ML — SIGNIFICANT CHANGE UP
GIANT PLATELETS BLD QL SMEAR: PRESENT — SIGNIFICANT CHANGE UP
GLUCOSE BLDC GLUCOMTR-MCNC: 102 MG/DL — HIGH (ref 70–99)
GLUCOSE BLDC GLUCOMTR-MCNC: 104 MG/DL — HIGH (ref 70–99)
GLUCOSE BLDC GLUCOMTR-MCNC: 136 MG/DL — HIGH (ref 70–99)
GLUCOSE BLDC GLUCOMTR-MCNC: 140 MG/DL — HIGH (ref 70–99)
GLUCOSE BLDC GLUCOMTR-MCNC: 142 MG/DL — HIGH (ref 70–99)
GLUCOSE SERPL-MCNC: 111 MG/DL — HIGH (ref 70–99)
HCT VFR BLD CALC: 36 % — SIGNIFICANT CHANGE UP (ref 34.5–45)
HGB BLD-MCNC: 12.2 G/DL — SIGNIFICANT CHANGE UP (ref 11.5–15.5)
HYPOCHROMIA BLD QL: SLIGHT — SIGNIFICANT CHANGE UP
LACTATE SERPL-SCNC: 0.9 MMOL/L — SIGNIFICANT CHANGE UP (ref 0.5–2)
LYMPHOCYTES # BLD AUTO: 0.7 K/UL — LOW (ref 1–3.3)
LYMPHOCYTES # BLD AUTO: 8 % — LOW (ref 13–44)
MAGNESIUM SERPL-MCNC: 2.2 MG/DL — SIGNIFICANT CHANGE UP (ref 1.6–2.6)
MANUAL SMEAR VERIFICATION: SIGNIFICANT CHANGE UP
MCHC RBC-ENTMCNC: 31.5 PG — SIGNIFICANT CHANGE UP (ref 27–34)
MCHC RBC-ENTMCNC: 33.9 GM/DL — SIGNIFICANT CHANGE UP (ref 32–36)
MCV RBC AUTO: 93 FL — SIGNIFICANT CHANGE UP (ref 80–100)
METAMYELOCYTES # FLD: 0.9 % — HIGH (ref 0–0)
MONOCYTES # BLD AUTO: 0 K/UL — SIGNIFICANT CHANGE UP (ref 0–0.9)
MONOCYTES NFR BLD AUTO: 0 % — LOW (ref 2–14)
NEUTROPHILS # BLD AUTO: 7.96 K/UL — HIGH (ref 1.8–7.4)
NEUTROPHILS NFR BLD AUTO: 91.1 % — HIGH (ref 43–77)
OVALOCYTES BLD QL SMEAR: SLIGHT — SIGNIFICANT CHANGE UP
PLAT MORPH BLD: NORMAL — SIGNIFICANT CHANGE UP
PLATELET # BLD AUTO: 13 K/UL — CRITICAL LOW (ref 150–400)
POLYCHROMASIA BLD QL SMEAR: SLIGHT — SIGNIFICANT CHANGE UP
POTASSIUM SERPL-MCNC: 3.9 MMOL/L — SIGNIFICANT CHANGE UP (ref 3.5–5.3)
POTASSIUM SERPL-SCNC: 3.9 MMOL/L — SIGNIFICANT CHANGE UP (ref 3.5–5.3)
PROT SERPL-MCNC: 9.3 G/DL — HIGH (ref 6–8.3)
RBC # BLD: 3.87 M/UL — SIGNIFICANT CHANGE UP (ref 3.8–5.2)
RBC # FLD: 11.9 % — SIGNIFICANT CHANGE UP (ref 10.3–14.5)
RBC BLD AUTO: ABNORMAL
SARS-COV-2 IGG+IGM SERPL QL IA: >250 U/ML — HIGH
SARS-COV-2 IGG+IGM SERPL QL IA: POSITIVE
SODIUM SERPL-SCNC: 139 MMOL/L — SIGNIFICANT CHANGE UP (ref 135–145)
TSH SERPL-MCNC: 2.19 UIU/ML — SIGNIFICANT CHANGE UP (ref 0.27–4.2)
VIT B12 SERPL-MCNC: 583 PG/ML — SIGNIFICANT CHANGE UP (ref 232–1245)
WBC # BLD: 8.74 K/UL — SIGNIFICANT CHANGE UP (ref 3.8–10.5)
WBC # FLD AUTO: 8.74 K/UL — SIGNIFICANT CHANGE UP (ref 3.8–10.5)

## 2021-07-06 PROCEDURE — 99233 SBSQ HOSP IP/OBS HIGH 50: CPT | Mod: GC

## 2021-07-06 PROCEDURE — 76700 US EXAM ABDOM COMPLETE: CPT | Mod: 26

## 2021-07-06 RX ORDER — DIPHENHYDRAMINE HCL 50 MG
50 CAPSULE ORAL ONCE
Refills: 0 | Status: COMPLETED | OUTPATIENT
Start: 2021-07-06 | End: 2021-07-06

## 2021-07-06 RX ORDER — COLCHICINE 0.6 MG
0.6 TABLET ORAL DAILY
Refills: 0 | Status: DISCONTINUED | OUTPATIENT
Start: 2021-07-06 | End: 2021-07-08

## 2021-07-06 RX ORDER — ACETAMINOPHEN 500 MG
650 TABLET ORAL ONCE
Refills: 0 | Status: COMPLETED | OUTPATIENT
Start: 2021-07-06 | End: 2021-07-06

## 2021-07-06 RX ADMIN — Medication 650 MILLIGRAM(S): at 19:30

## 2021-07-06 RX ADMIN — Medication 650 MILLIGRAM(S): at 19:23

## 2021-07-06 RX ADMIN — Medication 120 MILLIGRAM(S): at 18:59

## 2021-07-06 RX ADMIN — Medication 137 MICROGRAM(S): at 06:42

## 2021-07-06 RX ADMIN — Medication 50 MILLIGRAM(S): at 19:23

## 2021-07-06 RX ADMIN — SERTRALINE 50 MILLIGRAM(S): 25 TABLET, FILM COATED ORAL at 11:38

## 2021-07-06 RX ADMIN — IMMUNE GLOBULIN (HUMAN) 162.5 GRAM(S): 10 INJECTION INTRAVENOUS; SUBCUTANEOUS at 19:51

## 2021-07-06 RX ADMIN — Medication 0.6 MILLIGRAM(S): at 16:56

## 2021-07-06 RX ADMIN — PANTOPRAZOLE SODIUM 40 MILLIGRAM(S): 20 TABLET, DELAYED RELEASE ORAL at 06:43

## 2021-07-06 NOTE — DISCHARGE NOTE PROVIDER - CARE PROVIDERS DIRECT ADDRESSES
[email or web address] ,[email or web address]

## 2021-07-06 NOTE — DISCHARGE NOTE PROVIDER - HOSPITAL COURSE
#Discharge: do not delete    DREW ZAMORA is a 61y Female with a past medical history of _____    Presented with _____, found to have _____    Problem List/Main Diagnoses (system-based):   #     #     #    Inpatient treatment course:     New medications:   Labs to be followed outpatient:   Exam to be followed outpatient:       LABS & STUDIES:  COVID-19 PCR: Negative (05 Jul 2021 19:48)  COVID-19 PCR: Negative (04 Jul 2021 17:37)  COVID-19 PCR: Negative (23 Jun 2021 14:58)   #Discharge: do not delete    DREW ZAMORA is a 62 y/o F, with a past medical history of hypothyroidism, HLD, depression, arthritis, ITP (new diagnosis, initially admitted 6/23, seen in ED 7/4) sent to ED 7/5 by heme/onc outpt for plt count "<1", found to be thrombocytopenic, admitted 7/5 for further management of thrombocytopenia 2/2 to ITP.       Problem List/Main Diagnoses (system-based):   # Thrombocytopenia secondary to ITP  -Plt 4 on admission. Physical exam significant for diffuse ecchymosis throughout upper extremities and diffuse petechiae on all extremities. Ecchymosis did not grow in size and there were no other signs of bleeding including bleeding from gums, melena, hematochezia, hematemesis, hematuria, headache. Pt noted chronic morning joint stiffness throughout body including back and knees. Hospital course significant for 4 days of IV Decadron 40mg and 2 days of IVIG 65mg. Plt count progressively increased to 67. Etiology is likely primary ITP. Workup for ITP as per heme/onc only significant for H pylori IgG. Pt stable for discharge home as per heme/onc recs.   -F/u outpatient heme/onc Dr. Fulton 7/15  -F/u rheum for further workup of ITP and morning joint stiffness Dr. Daly 7/16    #Arthritis  -Chronic morning joint stiffness of back and knees. Previous workup negative for anti-dsDNA, anti-Cobb, anti-RNP, rheumatoid factor. ERIN titer 1:80.   -C/w colchicine as per outpt rheum.  -F/u rheum for further workup of ITP and morning joint stiffness Dr. Daly 7/16      New medications:   Labs to be followed outpatient: Tick-borne diseases antibody panel, Ehrlichia/Anaplasmosis PCR, Lyme PCR  Exam to be followed outpatient:       LABS & STUDIES:  COVID-19 PCR: Negative (05 Jul 2021 19:48)  COVID-19 PCR: Negative (04 Jul 2021 17:37)  COVID-19 PCR: Negative (23 Jun 2021 14:58)   #Discharge: do not delete    DREW ZAMORA is a 62 y/o F, with a past medical history of hypothyroidism, HLD, depression, arthritis, ITP (new diagnosis, initially admitted 6/23, seen in ED 7/4) sent to ED 7/5 by heme/onc outpt for plt count "<1", found to be thrombocytopenic, admitted 7/5 for further management of thrombocytopenia 2/2 to ITP.       Problem List/Main Diagnoses (system-based):   # Thrombocytopenia secondary to ITP  -Plt 4 on admission. Physical exam significant for diffuse ecchymosis throughout upper extremities and diffuse petechiae on all extremities. Ecchymosis did not grow in size and there were no other signs of bleeding including bleeding from gums, melena, hematochezia, hematemesis, hematuria, headache. Pt noted chronic morning joint stiffness throughout body including back and knees. Hospital course significant for 4 days of IV Decadron 40mg and 2 days of IVIG 65mg. Plt count progressively increased to 67. Etiology is likely primary ITP. Workup for ITP as per heme/onc only significant for H pylori IgG. Pt stable for discharge home as per heme/onc recs.   -F/u outpatient heme/onc Dr. Fulton 7/15  -F/u rheum for further workup of ITP and morning joint stiffness Dr. Daly 7/16  -H. pylori breath test to evaluate positive H pylori IgG    #Arthritis  -Chronic morning joint stiffness of back and knees. Previous workup negative for anti-dsDNA, anti-Cobb, anti-RNP, rheumatoid factor. ERIN titer 1:80.   -C/w colchicine as per outpt rheum.  -F/u rheum for further workup of ITP and morning joint stiffness Dr. Daly 7/16      New medications:   Labs to be followed outpatient: Tick-borne diseases antibody panel, Ehrlichia/Anaplasmosis PCR, Lyme PCR  Exam to be followed outpatient: H. pylori breath test to evaluate of the positive H pylori IgG.       LABS & STUDIES:  COVID-19 PCR: Negative (05 Jul 2021 19:48)  COVID-19 PCR: Negative (04 Jul 2021 17:37)  COVID-19 PCR: Negative (23 Jun 2021 14:58)

## 2021-07-06 NOTE — DISCHARGE NOTE PROVIDER - NSDCCPCAREPLAN_GEN_ALL_CORE_FT
PRINCIPAL DISCHARGE DIAGNOSIS  Diagnosis: Thrombocytopenia  Assessment and Plan of Treatment: Thrombocytopenia means that your platelet count is low. This is most likely because of immune thrombocytopenia (ITP). ITP is a bleeding disorder. People with ITP can bruise or bleed much more easily than normal. ITP causes low platelets. Platelets are cells that are normally found in the blood that help blood clot. People with ITP have fewer platelets than normal. That's because their immune system destroys their platelets. While in the hospital, you received steroids (dexamethasone) which can stop the body's immune system from destroying platelets. You also received intravenous immune globulin (IVIG) which also can stop the destruction of platelets. While you were here, your platelet count went from 4 in the beginning to 67.   There are other treatments available for ITP. It is recommended that you discuss these options with Dr. Fulton with whom you have an appointment on 7/15.      SECONDARY DISCHARGE DIAGNOSES  Diagnosis: Arthritis  Assessment and Plan of Treatment: You have chronic morning stiffness of your body including your back and knees. There are many causes of this and you are seeing Dr. Daly for further evaluation of your pain. Your arthritis may be due to inflammation and can be further discussed with Dr. Daly. Please follow up with Dr. Daly on 7/16 as planned.     PRINCIPAL DISCHARGE DIAGNOSIS  Diagnosis: Thrombocytopenia  Assessment and Plan of Treatment: Thrombocytopenia means that your platelet count is low. This is most likely because of immune thrombocytopenia (ITP). ITP is a bleeding disorder. People with ITP can bruise or bleed much more easily than normal. ITP causes low platelets. Platelets are cells that are normally found in the blood that help blood clot. People with ITP have fewer platelets than normal. That's because their immune system destroys their platelets. While in the hospital, you received steroids (dexamethasone) which can stop the body's immune system from destroying platelets. You also received intravenous immune globulin (IVIG) which also can stop the destruction of platelets. While you were here, your platelet count went from 4 in the beginning to 67.   There are other treatments available for ITP. It is recommended that you discuss these options with Dr. Fulton with whom you have an appointment on 7/15.      SECONDARY DISCHARGE DIAGNOSES  Diagnosis: Arthritis  Assessment and Plan of Treatment: You have chronic morning stiffness of your body including your back and knees. There are many causes of this and you are seeing Dr. Daly for further evaluation of your pain. Your arthritis may be due to inflammation and can be further discussed with Dr. Daly. Please follow up with Dr. Daly on 7/16 as planned.    Diagnosis: Helicobacter pylori ab+  Assessment and Plan of Treatment: You tested positive for antibodies for H Pylori, which can be indicative of an old or active infection. This is sometimes related to ITP as well. Please get an H Pylori breath test as an outpatient. If positive, you may need to be treated with antibiotics.

## 2021-07-06 NOTE — DISCHARGE NOTE PROVIDER - CARE PROVIDER_API CALL
Azalia Schrader)  Medical Oncology  [address] [place] [address]  Phone: [phone]  Fax: [phone]  Follow Up Time: 1 week   Jt Fulton)  Hematology; Medical Oncology  [address], [address]  [place] [address]  Phone: [phone]  Fax: [phone]  Established Patient  Scheduled Appointment: 07/15/2021 01:00 PM    Sudhakar Daly  INTERNAL MEDICINE  [address] [place] [address]  Phone: [phone]  Fax: [phone]  Established Patient  Scheduled Appointment: 07/16/2021

## 2021-07-06 NOTE — DISCHARGE NOTE PROVIDER - PROVIDER TOKENS
PROVIDER:[TOKEN:[[id number]],FOLLOWUP:[1 week]] PROVIDER:[TOKEN:[[id number]],SCHEDULEDAPPT:[07/15/2021],SCHEDULEDAPPTTIME:[01:00 PM],ESTABLISHEDPATIENT:[T]],PROVIDER:[TOKEN:[[id number]],SCHEDULEDAPPT:[07/16/2021],ESTABLISHEDPATIENT:[T]]

## 2021-07-06 NOTE — PROGRESS NOTE ADULT - PROBLEM SELECTOR PLAN 3
Chronic joint stiffness of entire body including back and knees. Following outpatient rheumatologist for arthritis in setting of thrombocytopenia. ERIN 1:80 on 6/24. Anti-dsDNA, anti-RNP, anti-Smith, rheumatoid factor negative 6/24.   -Rheum consult as per heme/onc recs. Chronic joint stiffness of entire body including back and knees. Following outpatient rheumatologist Dr. Daly for arthritis in setting of thrombocytopenia. ERIN 1:80 on 6/24. Anti-dsDNA, anti-RNP, anti-Smith, rheumatoid factor negative 6/24.   -Rheum consult as per heme/onc recs. Chronic joint stiffness of entire body including back and knees. Following outpatient rheumatologist Dr. Daly for arthritis in setting of thrombocytopenia. ERIN 1:80 on 6/24. Anti-dsDNA, anti-RNP, anti-Smith, rheumatoid factor negative 6/24.   -appreciate hem/onc recs, will not consult rheum at this time  -started colchicine 0.6mg qD for inflammation per rheumatology recs

## 2021-07-06 NOTE — DISCHARGE NOTE PROVIDER - NSDCMRMEDTOKEN_GEN_ALL_CORE_FT
Patient called requesting a return call. Patient stated she is experiencing blurry vision. Please return call to discuss [phone].   tri ezetimibe 10 mg oral tablet: 1 tab(s) orally once a day  levothyroxine 125 mcg (0.125 mg) oral tablet: 1 tab(s) orally Saturday and Sunday  levothyroxine 137 mcg (0.137 mg) oral capsule: 1 cap(s) orally 5 times a week    Monday -Friday  sertraline 50 mg oral tablet: 1 tab(s) orally once a day   colchicine 0.6 mg oral tablet: 1 tab(s) orally once a day  ezetimibe 10 mg oral tablet: 1 tab(s) orally once a day  levothyroxine 125 mcg (0.125 mg) oral tablet: 1 tab(s) orally Saturday and Sunday  levothyroxine 137 mcg (0.137 mg) oral capsule: 1 cap(s) orally 5 times a week    Monday -Friday  sertraline 50 mg oral tablet: 1 tab(s) orally once a day

## 2021-07-06 NOTE — CONSULT NOTE ADULT - ASSESSMENT
60 yo F, [place], with PMHx of hypothyroidism, hyperlipidemia, anxiety/depression, macular degeneration, bilateral knee osteoarthritis, was sent to ED for low platelets. Patient has had bruising on her arms/legs/back/torso. Hematology consulted for thrombocytopenia.    #) DIAGNOSIS  - Severe thrombocytopenia, likely ITP    #) PLAN  - While the patient presents with bruising in the extremities/abdomen/back, this is not considered a clinically significant bleed. Please check for palatal petechiae or blood blister in the mouth daily; new onset may suggest lack of clinical improvement and necessitate CT Head non-contrast to evaluate for intracranial hemorrhage.   - Continue with IV Decadron 40 mg once daily (Day 3 of 4). s/p IVIG (07/05), to get second dose of IVIG today (07/06). Follow-up CBC today.   - Peripheral flow cytometry (06/23) did not show any circulating blasts or abnormal cell population. HIV and HCV negative. RF WNL, B12/Folate WNL. TSH grossly elevated consistent with a diagnosis of hypothyroidism, poorly controlled, consider Endocrinology consult. Obtain Rheumatology consult given clinical presentation suggestive of polymyositis or polymyalgia rheumatica, particularly in relation to bilateral stiff shoulder joints/hip girdle, muscle/joint pain, and ERIN 1:80 (could be falsely low given steroids). Send H. pylori serum antibody or H. pylori stool antigen. Evaluate for tick-borne illnesses (Anaplasma, Erlichia, Lyme).   - Home Medications reviewed: Naproxen can cause drug-induced ITP. However, she takes it only once-twice weekly. This is rare but known complication of Naproxen. Will consider as possible culprit if other testing is negative/inconclusive.   - Obtain US Abdomen to evaluate for hepatosplenomegaly.   - Monitor CBC with differential once daily.   - Maintain active T+S, transfuse platelets if < 10K or < 20K if febrile or < 50K if clinically important bleeding   - Upon improvement and stabilization of platelets > 50K, can discharge to see Dr. Azalia Schrader at [facility] [address] [place] [address] () in 1 week     Discussed with Dr. Mercado  60 yo F, [place], with PMHx of hypothyroidism, hyperlipidemia, anxiety/depression, macular degeneration, bilateral knee osteoarthritis, was sent to ED for low platelets. Patient has had bruising on her arms/legs/back/torso. Hematology consulted for thrombocytopenia.    #) DIAGNOSIS  - Severe thrombocytopenia, likely ITP, improving     #) PLAN  - While the patient presents with bruising in the extremities/abdomen/back, this is not considered a clinically significant bleed. She has a small palatal petechiae. Please check for expanding palatal petechiae or blood blister in the mouth daily; new onset may suggest lack of clinical improvement and necessitate CT Head non-contrast to evaluate for intracranial hemorrhage.   - Continue with IV Decadron 40 mg once daily (Day 3 of 4). s/p IVIG (07/05), to get second dose of IVIG today (07/06).   - Peripheral flow cytometry (06/23) did not show any circulating blasts or abnormal cell population. HIV and HCV negative. RF WNL, B12/Folate WNL. TSH grossly elevated consistent with a diagnosis of hypothyroidism, poorly controlled, consider Endocrinology consult. Obtain Rheumatology consult given clinical presentation suggestive of polymyositis or polymyalgia rheumatica, particularly in relation to bilateral stiff shoulder joints/hip girdle, muscle/joint pain, and ERIN 1:80 (could be falsely low given steroids). Send H. pylori serum antibody or H. pylori stool antigen. Evaluate for tick-borne illnesses (Anaplasma, Erlichia, Lyme).   - Home Medications reviewed: Naproxen can cause drug-induced ITP. However, she takes it only once-twice weekly. This is rare but known complication of Naproxen. Will consider as possible culprit if other testing is negative/inconclusive.   - Obtain US Abdomen to evaluate for hepatosplenomegaly.   - Monitor CBC with differential once daily.   - Maintain active T+S, transfuse platelets if < 10K or < 20K if febrile or < 50K if clinically important bleeding   - Upon improvement and stabilization of platelets > 50K, can discharge to see Dr. Azalia Schrader at [facility] [address] [place] [address] () in 1 week     Discussed with Dr. Mercado  62 yo F, [place], with PMHx of hypothyroidism, hyperlipidemia, anxiety/depression, macular degeneration, bilateral knee osteoarthritis, was sent to ED for low platelets. Patient has had bruising on her arms/legs/back/torso. Hematology consulted for thrombocytopenia.    #) DIAGNOSIS  - Severe thrombocytopenia, likely ITP, improving     #) PLAN  - While the patient presents with bruising in the extremities/abdomen/back, this is not considered a clinically significant bleed. She has a small palatal petechiae. Please check for expanding palatal petechiae or blood blister in the mouth daily; new onset may suggest lack of clinical improvement and necessitate CT Head non-contrast to evaluate for intracranial hemorrhage.   - Continue with IV Decadron 40 mg once daily (Day 3 of 4). s/p IVIG (07/05), to get second dose of IVIG today (07/06).   - Peripheral flow cytometry (06/23) did not show any circulating blasts or abnormal cell population. HIV and HCV negative. RF WNL, B12/Folate WNL. TSH grossly elevated consistent with a diagnosis of hypothyroidism, poorly controlled, consider Endocrinology consult. Obtain Rheumatology consult given clinical presentation suggestive of polymyositis or polymyalgia rheumatica, particularly in relation to bilateral stiff shoulder joints/hip girdle, muscle/joint pain, and ERIN 1:80 (could be falsely low given steroids). Send H. pylori serum antibody or H. pylori stool antigen. Evaluate for tick-borne illnesses (Anaplasma, Erlichia, Lyme).   - Home Medications reviewed: Naproxen can cause drug-induced ITP. However, she has not taken this medication since last discharge.   - Obtain US Abdomen to evaluate for hepatosplenomegaly.   - Monitor CBC with differential once daily.   - Maintain active T+S, transfuse platelets if < 10K or < 20K if febrile or < 50K if clinically important bleeding   - Upon improvement and stabilization of platelets > 50K, can discharge to see Dr. Azalia Schrader at [facility] [address] [place] [address] () in 1 week     Discussed with Dr. Mercado

## 2021-07-06 NOTE — PROGRESS NOTE ADULT - ASSESSMENT
60 y/o F, with a past medical history of hypothyroidism, HLD, depression, arthritis, ITP (new diagnosis, initially admitted 6/23, seen in ED 7/4) presented to the ED 7/5 after plt count "<1" at heme/onc Dr. office, now admitted for further management of thrombocytopenia/ITP.  60 y/o F, with a past medical history of hypothyroidism, HLD, depression, arthritis, ITP (new diagnosis, initially admitted 6/23, seen in ED 7/4) presented to the ED 7/5 after plt count "<1" at heme/onc Dr. Fulton office, now admitted for further management of thrombocytopenia/ITP.

## 2021-07-06 NOTE — CONSULT NOTE ADULT - SUBJECTIVE AND OBJECTIVE BOX
LENGTH OF HOSPITAL STAY: 1d    SUBJECTIVE:     HISTORY OF PRESENTING ILLNESS:   61F PMH ITP (Dx in June 2021 rx'ed w/ Steroids/IVIG), Hypothyroidism, HLD, Depression, arthritis, and macular degeneration presents from outpt Hematologist office w/ Plt count drop from 15k on 7/4 to 1k on 7/5. Of note pt was seen at [facility] ED on 7/4 after was found to have Plt<15k on outpt labs was dc'ed home as per recommendation of hematologist,  on Decadron x 3 days and outpt Hem f/up. Pt notes multiple large  bruising to b/l UE but has not increased over the past few days. Pt also suffers from long standing fatigue and diffuse joint stiffness, worse in back, knees and shoulder, that occurs in the morning and eventually improves after a couple of hours. On ROS denies fever, night sweats, gum bleeding, rash, joint swelling, chest pain, palpitation, SOB, abdominal pain, any recent illness, travel hx  and dysuria. Pt seen by H/O in the ED recommended IVIG and Steroids. Admitted to medicine for management of ITP.   Pt was admitted at [facility] in June and had extensive autoimmune/infx/Hem w/up that was grossly unremarkable and was instructed to f/u w/ Rheum and Hem as outpt. F/up w/ Rheum at [facility]    ED Course:   VS: 98.3 85 134/99 99% RA  Labs: Plt 4; PTT 21.6; Cl 109 HCO3 19 AST 42  Imaging: None   Rx: Decadron 40 IV X1; Benadryl IVIG X1    Outpt Hem Dr. Quiñones, Rheum Dr. Foster at [facility]   (05 Jul 2021 20:36)    PAST MEDICAL & SURGICAL HISTORY:  Hypothyroidism  HLD (hyperlipidemia)  History of ITP  Arthritis  No significant past surgical history    SOCIAL HISTORY:    ALLERGIES:  azithromycin (Rash)  penicillin (Rash)  vancomycin (Rash)    MEDICATIONS:  STANDING MEDICATIONS  acetaminophen   Tablet .. 650 milliGRAM(s) Oral once  dexAMETHasone  IVPB 40 milliGRAM(s) IV Intermittent every 24 hours  dextrose 40% Gel 15 Gram(s) Oral once  dextrose 5%. 1000 milliLiter(s) IV Continuous <Continuous>  dextrose 5%. 1000 milliLiter(s) IV Continuous <Continuous>  dextrose 50% Injectable 25 Gram(s) IV Push once  dextrose 50% Injectable 12.5 Gram(s) IV Push once  dextrose 50% Injectable 25 Gram(s) IV Push once  diphenhydrAMINE 50 milliGRAM(s) Oral once  glucagon  Injectable 1 milliGRAM(s) IntraMuscular once  immune   globulin 10% (GAMMAGARD) IVPB 65 Gram(s) IV Intermittent once  insulin lispro (ADMELOG) corrective regimen sliding scale   SubCutaneous three times a day before meals  insulin lispro (ADMELOG) corrective regimen sliding scale   SubCutaneous at bedtime  levothyroxine 125 MICROGram(s) Oral <User Schedule>  levothyroxine 137 MICROGram(s) Oral <User Schedule>  pantoprazole    Tablet 40 milliGRAM(s) Oral before breakfast  sertraline 50 milliGRAM(s) Oral daily    PRN MEDICATIONS    VITALS:   T(F): 97.8  HR: 76  BP: 138/86  RR: 18  SpO2: 96%    LABS:                        13.6   9.75  )-----------( 4        ( 05 Jul 2021 17:09 )             39.8     07-05    140  |  109<H>  |  16  ----------------------------<  107<H>  4.6   |  19<L>  |  0.89    Ca    9.2      05 Jul 2021 17:09    TPro  8.3  /  Alb  3.8  /  TBili  0.5  /  DBili  x   /  AST  42<H>  /  ALT  27  /  AlkPhos  77  07-05    PT/INR - ( 05 Jul 2021 17:09 )   PT: 12.4 sec;   INR: 1.04        PTT - ( 05 Jul 2021 17:09 )  PTT:21.6 sec    Sedimentation Rate, Erythrocyte: 59 mm/Hr *H* (07-05-21 @ 22:56)    RADIOLOGY:  None     PHYSICAL EXAM:  GEN: No acute distress  HEENT:   LUNGS: Clear to auscultation bilaterally   HEART: S1/S2 present. RRR.   ABD: Soft, non-tender, non-distended. Bowel sounds present  EXT:  NEURO: AAOX3     LENGTH OF HOSPITAL STAY: 1d    SUBJECTIVE:     HISTORY OF PRESENTING ILLNESS:   61F PMH ITP (Dx in June 2021 rx'ed w/ Steroids/IVIG), Hypothyroidism, HLD, Depression, arthritis, and macular degeneration presents from outpt Hematologist office w/ Plt count drop from 15k on 7/4 to 1k on 7/5. Of note pt was seen at [facility] ED on 7/4 after was found to have Plt<15k on outpt labs was dc'ed home as per recommendation of hematologist,  on Decadron x 3 days and outpt Hem f/up. Pt notes multiple large  bruising to b/l UE but has not increased over the past few days. Pt also suffers from long standing fatigue and diffuse joint stiffness, worse in back, knees and shoulder, that occurs in the morning and eventually improves after a couple of hours. On ROS denies fever, night sweats, gum bleeding, rash, joint swelling, chest pain, palpitation, SOB, abdominal pain, any recent illness, travel hx  and dysuria. Pt seen by H/O in the ED recommended IVIG and Steroids. Admitted to medicine for management of ITP.   Pt was admitted at [facility] in June and had extensive autoimmune/infx/Hem w/up that was grossly unremarkable and was instructed to f/u w/ Rheum and Hem as outpt. F/up w/ Rheum at [facility]    Home Medications: Naproxen 500 mg, Ezetimibe 10 mg, Olopatadine 0.2% eye drop, Sertraline 50 mg, Synthroid 137 mcg M-F, Synthroid 125 mcg S/S.     Mammogram in 2021 normal. Pap smear 2010, normal. Colonoscopy in 2015 or 2016 was normal.     PAST MEDICAL & SURGICAL HISTORY:  Hypothyroidism  HLD (hyperlipidemia)  History of ITP  Arthritis  No significant past surgical history    SOCIAL HISTORY:  Works at a law firm in administration  Does not smoke. Denies drug use. Takes Magnesium, Zinc, Calcium/Vitamin D3, Biotin and Lyrine   Mother: Had brain aneurysm in 1996  Father: Had small bladder tumor that was removed   Brother: Grave's ophthalmopathy   Denies family history of bleeding or clotting disorders.     ALLERGIES:  azithromycin (Rash)  penicillin (Rash)  vancomycin (Rash)    MEDICATIONS:  STANDING MEDICATIONS  acetaminophen   Tablet .. 650 milliGRAM(s) Oral once  dexAMETHasone  IVPB 40 milliGRAM(s) IV Intermittent every 24 hours  dextrose 40% Gel 15 Gram(s) Oral once  dextrose 5%. 1000 milliLiter(s) IV Continuous <Continuous>  dextrose 5%. 1000 milliLiter(s) IV Continuous <Continuous>  dextrose 50% Injectable 25 Gram(s) IV Push once  dextrose 50% Injectable 12.5 Gram(s) IV Push once  dextrose 50% Injectable 25 Gram(s) IV Push once  diphenhydrAMINE 50 milliGRAM(s) Oral once  glucagon  Injectable 1 milliGRAM(s) IntraMuscular once  immune   globulin 10% (GAMMAGARD) IVPB 65 Gram(s) IV Intermittent once  insulin lispro (ADMELOG) corrective regimen sliding scale   SubCutaneous three times a day before meals  insulin lispro (ADMELOG) corrective regimen sliding scale   SubCutaneous at bedtime  levothyroxine 125 MICROGram(s) Oral <User Schedule>  levothyroxine 137 MICROGram(s) Oral <User Schedule>  pantoprazole    Tablet 40 milliGRAM(s) Oral before breakfast  sertraline 50 milliGRAM(s) Oral daily    PRN MEDICATIONS    VITALS:   T(F): 97.8  HR: 76  BP: 138/86  RR: 18  SpO2: 96%    LABS:                        13.6   9.75  )-----------( 4        ( 05 Jul 2021 17:09 )             39.8     07-05    140  |  109<H>  |  16  ----------------------------<  107<H>  4.6   |  19<L>  |  0.89    Ca    9.2      05 Jul 2021 17:09    TPro  8.3  /  Alb  3.8  /  TBili  0.5  /  DBili  x   /  AST  42<H>  /  ALT  27  /  AlkPhos  77  07-05    PT/INR - ( 05 Jul 2021 17:09 )   PT: 12.4 sec;   INR: 1.04        PTT - ( 05 Jul 2021 17:09 )  PTT:21.6 sec    Sedimentation Rate, Erythrocyte: 59 mm/Hr *H* (07-05-21 @ 22:56)    RADIOLOGY:  None     PHYSICAL EXAM:  GEN: No acute distress  HEENT: NCAT  LUNGS: Clear to auscultation bilaterally   HEART: S1/S2 present. RRR.   ABD: Soft, non-tender, non-distended. Bowel sounds present. Bruising on torso/abdomen/arms/legs. No palatal petechiae. No hepatosplenomegaly.   EXT: No pitting edema  NEURO: AAOX3     LENGTH OF HOSPITAL STAY: 1d    SUBJECTIVE: No acute overnight events    HISTORY OF PRESENTING ILLNESS:   61F PMH ITP (Dx in June 2021 rx'ed w/ Steroids/IVIG), Hypothyroidism, HLD, Depression, arthritis, and macular degeneration presents from outpt Hematologist office w/ Plt count drop from 15k on 7/4 to 1k on 7/5. Of note pt was seen at [facility] ED on 7/4 after was found to have Plt<15k on outpt labs was dc'ed home as per recommendation of hematologist,  on Decadron x 3 days and outpt Hem f/up. Pt notes multiple large  bruising to b/l UE but has not increased over the past few days. Pt also suffers from long standing fatigue and diffuse joint stiffness, worse in back, knees and shoulder, that occurs in the morning and eventually improves after a couple of hours. On ROS denies fever, night sweats, gum bleeding, rash, joint swelling, chest pain, palpitation, SOB, abdominal pain, any recent illness, travel hx  and dysuria. Pt seen by H/O in the ED recommended IVIG and Steroids. Admitted to medicine for management of ITP.   Pt was admitted at [facility] in June and had extensive autoimmune/infx/Hem w/up that was grossly unremarkable and was instructed to f/u w/ Rheum and Hem as outpt. F/up w/ Rheum at [facility]    Home Medications: Naproxen 500 mg, Ezetimibe 10 mg, Olopatadine 0.2% eye drop, Sertraline 50 mg, Synthroid 137 mcg M-F, Synthroid 125 mcg S/S.     Mammogram in 2021 normal. Pap smear 2010, normal. Colonoscopy in 2015 or 2016 was normal.     PAST MEDICAL & SURGICAL HISTORY:  Hypothyroidism  HLD (hyperlipidemia)  History of ITP  Arthritis  No significant past surgical history    SOCIAL HISTORY:  Works at a law firm in administration  Does not smoke. Denies drug use. Takes Magnesium, Zinc, Calcium/Vitamin D3, Biotin and Lyrine   Mother: Had brain aneurysm in 1996  Father: Had small bladder tumor that was removed   Brother: Grave's ophthalmopathy   Denies family history of bleeding or clotting disorders.     ALLERGIES:  azithromycin (Rash)  penicillin (Rash)  vancomycin (Rash)    MEDICATIONS:  STANDING MEDICATIONS  acetaminophen   Tablet .. 650 milliGRAM(s) Oral once  dexAMETHasone  IVPB 40 milliGRAM(s) IV Intermittent every 24 hours  dextrose 40% Gel 15 Gram(s) Oral once  dextrose 5%. 1000 milliLiter(s) IV Continuous <Continuous>  dextrose 5%. 1000 milliLiter(s) IV Continuous <Continuous>  dextrose 50% Injectable 25 Gram(s) IV Push once  dextrose 50% Injectable 12.5 Gram(s) IV Push once  dextrose 50% Injectable 25 Gram(s) IV Push once  diphenhydrAMINE 50 milliGRAM(s) Oral once  glucagon  Injectable 1 milliGRAM(s) IntraMuscular once  immune   globulin 10% (GAMMAGARD) IVPB 65 Gram(s) IV Intermittent once  insulin lispro (ADMELOG) corrective regimen sliding scale   SubCutaneous three times a day before meals  insulin lispro (ADMELOG) corrective regimen sliding scale   SubCutaneous at bedtime  levothyroxine 125 MICROGram(s) Oral <User Schedule>  levothyroxine 137 MICROGram(s) Oral <User Schedule>  pantoprazole    Tablet 40 milliGRAM(s) Oral before breakfast  sertraline 50 milliGRAM(s) Oral daily    PRN MEDICATIONS    VITALS:   T(F): 97.8  HR: 76  BP: 138/86  RR: 18  SpO2: 96%    LABS:                        13.6   9.75  )-----------( 4        ( 05 Jul 2021 17:09 )             39.8     07-05    140  |  109<H>  |  16  ----------------------------<  107<H>  4.6   |  19<L>  |  0.89    Ca    9.2      05 Jul 2021 17:09    TPro  8.3  /  Alb  3.8  /  TBili  0.5  /  DBili  x   /  AST  42<H>  /  ALT  27  /  AlkPhos  77  07-05    PT/INR - ( 05 Jul 2021 17:09 )   PT: 12.4 sec;   INR: 1.04        PTT - ( 05 Jul 2021 17:09 )  PTT:21.6 sec    Sedimentation Rate, Erythrocyte: 59 mm/Hr *H* (07-05-21 @ 22:56)    RADIOLOGY:  None     PHYSICAL EXAM:  GEN: No acute distress  HEENT: NCAT  LUNGS: Clear to auscultation bilaterally   HEART: S1/S2 present. RRR.   ABD: Soft, non-tender, non-distended. Bowel sounds present. Bruising on torso/abdomen/arms/legs. One small posterior palatal petechiae. No hepatosplenomegaly.   EXT: No pitting edema  NEURO: AAOX3

## 2021-07-06 NOTE — PROGRESS NOTE ADULT - SUBJECTIVE AND OBJECTIVE BOX
OVERNIGHT EVENTS: No acute events.     SUBJECTIVE / INTERVAL HPI: 60 y/o F, with a past medical history of hypothyroidism, HLD, depression, arthritis, ITP (new diagnosis, initially admitted 6/23, seen in ED 7/4) presented to the ED 7/5 after plt count "<1" at heme/onc  office. Pt noted fatigue as associated sx. She also stated that she has bruising on her upper extremities that started on 6/15. Pt also reported chronic joint stiffness of the back, and knees that is worse at beginning of day, being evaluated by her rheumatologist. Patient seen and examined at bedside.     VITAL SIGNS:  Vital Signs Last 24 Hrs  T(C): 36.6 (06 Jul 2021 06:08), Max: 37 (05 Jul 2021 21:26)  T(F): 97.8 (06 Jul 2021 06:08), Max: 98.6 (05 Jul 2021 21:26)  HR: 76 (06 Jul 2021 06:08) (58 - 85)  BP: 138/86 (06 Jul 2021 06:08) (114/78 - 160/95)  BP(mean): --  RR: 18 (06 Jul 2021 06:08) (18 - 18)  SpO2: 96% (06 Jul 2021 06:08) (95% - 99%)      PHYSICAL EXAM:    General: WDWN  HEENT: NC/AT; PERRL, anicteric sclera; MMM  Neck: supple  Cardiovascular: +S1/S2; RRR  Respiratory: CTA B/L; no W/R/R  Gastrointestinal: soft, NT/ND; +BSx4  Extremities: WWP; diffuse large bruises and petechiae on bilateral upper extremities; petechiae diffusely on lower extremities.   Vascular: 2+ radial, DP/PT pulses B/L  Neurological: AAOx3; no focal deficits    MEDICATIONS:  MEDICATIONS  (STANDING):  dexAMETHasone  IVPB 40 milliGRAM(s) IV Intermittent every 24 hours  dextrose 40% Gel 15 Gram(s) Oral once  dextrose 5%. 1000 milliLiter(s) (50 mL/Hr) IV Continuous <Continuous>  dextrose 5%. 1000 milliLiter(s) (100 mL/Hr) IV Continuous <Continuous>  dextrose 50% Injectable 25 Gram(s) IV Push once  dextrose 50% Injectable 12.5 Gram(s) IV Push once  dextrose 50% Injectable 25 Gram(s) IV Push once  glucagon  Injectable 1 milliGRAM(s) IntraMuscular once  immune   globulin 10% (GAMMAGARD) IVPB 65 Gram(s) IV Intermittent once  insulin lispro (ADMELOG) corrective regimen sliding scale   SubCutaneous three times a day before meals  insulin lispro (ADMELOG) corrective regimen sliding scale   SubCutaneous at bedtime  levothyroxine 125 MICROGram(s) Oral <User Schedule>  levothyroxine 137 MICROGram(s) Oral <User Schedule>  pantoprazole    Tablet 40 milliGRAM(s) Oral before breakfast  sertraline 50 milliGRAM(s) Oral daily    MEDICATIONS  (PRN):      ALLERGIES:  Allergies    azithromycin (Rash)  penicillin (Rash)  vancomycin (Rash)    Intolerances        LABS:                        13.6   9.75  )-----------( 4        ( 05 Jul 2021 17:09 )             39.8     07-05    140  |  109<H>  |  16  ----------------------------<  107<H>  4.6   |  19<L>  |  0.89    Ca    9.2      05 Jul 2021 17:09    TPro  8.3  /  Alb  3.8  /  TBili  0.5  /  DBili  x   /  AST  42<H>  /  ALT  27  /  AlkPhos  77  07-05    PT/INR - ( 05 Jul 2021 17:09 )   PT: 12.4 sec;   INR: 1.04          PTT - ( 05 Jul 2021 17:09 )  PTT:21.6 sec    CAPILLARY BLOOD GLUCOSE      POCT Blood Glucose.: 142 mg/dL (06 Jul 2021 00:37)        RADIOLOGY & ADDITIONAL TESTS: Reviewed.   OVERNIGHT EVENTS: No acute events.     SUBJECTIVE / INTERVAL HPI: 60 y/o F, with a past medical history of hypothyroidism, HLD, depression, arthritis, ITP (new diagnosis, initially admitted 6/23-6/25, then seen in ED 7/4 for thrombocytopenia) presented to the ED again 7/5 after plt count "<1" at heme/onc Dr. office. Pt noted fatigue as associated sx. She also stated that she has bruising on her upper extremities that started on 6/15. Pt also reported chronic joint stiffness of the back, and knees that is worse at beginning of day, being evaluated by her rheumatologist. Patient seen and examined at bedside. Currently, pt is without any melena, hematemesis, hematochezia, dysuria, hematuria, gingival bleeding, epistaxis, abdominal pain, CP, SOB, headache.     ROS: all other ROS negative.     VITAL SIGNS:  Vital Signs Last 24 Hrs  T(C): 36.6 (06 Jul 2021 06:08), Max: 37 (05 Jul 2021 21:26)  T(F): 97.8 (06 Jul 2021 06:08), Max: 98.6 (05 Jul 2021 21:26)  HR: 76 (06 Jul 2021 06:08) (58 - 85)  BP: 138/86 (06 Jul 2021 06:08) (114/78 - 160/95)  BP(mean): --  RR: 18 (06 Jul 2021 06:08) (18 - 18)  SpO2: 96% (06 Jul 2021 06:08) (95% - 99%)      PHYSICAL EXAM:    General: WDWN  HEENT: NC/AT; PERRL, anicteric sclera; MMM  Neck: supple  Cardiovascular: +S1/S2; RRR  Respiratory: CTA B/L; no W/R/R  Gastrointestinal: soft, NT/ND; +BSx4  Extremities: WWP; diffuse large bruises and petechiae on bilateral upper extremities; petechiae diffusely on lower extremities.   Vascular: 2+ radial, DP/PT pulses B/L  Neurological: AAOx3; no focal deficits    MEDICATIONS:  MEDICATIONS  (STANDING):  dexAMETHasone  IVPB 40 milliGRAM(s) IV Intermittent every 24 hours  dextrose 40% Gel 15 Gram(s) Oral once  dextrose 5%. 1000 milliLiter(s) (50 mL/Hr) IV Continuous <Continuous>  dextrose 5%. 1000 milliLiter(s) (100 mL/Hr) IV Continuous <Continuous>  dextrose 50% Injectable 25 Gram(s) IV Push once  dextrose 50% Injectable 12.5 Gram(s) IV Push once  dextrose 50% Injectable 25 Gram(s) IV Push once  glucagon  Injectable 1 milliGRAM(s) IntraMuscular once  immune   globulin 10% (GAMMAGARD) IVPB 65 Gram(s) IV Intermittent once  insulin lispro (ADMELOG) corrective regimen sliding scale   SubCutaneous three times a day before meals  insulin lispro (ADMELOG) corrective regimen sliding scale   SubCutaneous at bedtime  levothyroxine 125 MICROGram(s) Oral <User Schedule>  levothyroxine 137 MICROGram(s) Oral <User Schedule>  pantoprazole    Tablet 40 milliGRAM(s) Oral before breakfast  sertraline 50 milliGRAM(s) Oral daily    MEDICATIONS  (PRN):      ALLERGIES:  Allergies    azithromycin (Rash)  penicillin (Rash)  vancomycin (Rash)    Intolerances        LABS:                        13.6   9.75  )-----------( 4        ( 05 Jul 2021 17:09 )             39.8     07-05    140  |  109<H>  |  16  ----------------------------<  107<H>  4.6   |  19<L>  |  0.89    Ca    9.2      05 Jul 2021 17:09    TPro  8.3  /  Alb  3.8  /  TBili  0.5  /  DBili  x   /  AST  42<H>  /  ALT  27  /  AlkPhos  77  07-05    PT/INR - ( 05 Jul 2021 17:09 )   PT: 12.4 sec;   INR: 1.04          PTT - ( 05 Jul 2021 17:09 )  PTT:21.6 sec    CAPILLARY BLOOD GLUCOSE      POCT Blood Glucose.: 142 mg/dL (06 Jul 2021 00:37)        RADIOLOGY & ADDITIONAL TESTS: Reviewed.

## 2021-07-07 ENCOUNTER — NON-APPOINTMENT (OUTPATIENT)
Age: 61
End: 2021-07-07

## 2021-07-07 LAB
25(OH)D3 SERPL-MCNC: 18.9 NG/ML
ACE BLD-CCNC: 45 U/L
ANION GAP SERPL CALC-SCNC: 7 MMOL/L — SIGNIFICANT CHANGE UP (ref 5–17)
BASOPHILS # BLD AUTO: 0 K/UL
BASOPHILS # BLD AUTO: 0.01 K/UL — SIGNIFICANT CHANGE UP (ref 0–0.2)
BASOPHILS NFR BLD AUTO: 0 %
BASOPHILS NFR BLD AUTO: 0.2 % — SIGNIFICANT CHANGE UP (ref 0–2)
BUN SERPL-MCNC: 24 MG/DL — HIGH (ref 7–23)
CALCIUM SERPL-MCNC: 8.8 MG/DL — SIGNIFICANT CHANGE UP (ref 8.4–10.5)
CHLORIDE SERPL-SCNC: 106 MMOL/L — SIGNIFICANT CHANGE UP (ref 96–108)
CO2 SERPL-SCNC: 22 MMOL/L — SIGNIFICANT CHANGE UP (ref 22–31)
CREAT SERPL-MCNC: 1.01 MG/DL — SIGNIFICANT CHANGE UP (ref 0.5–1.3)
EOSINOPHIL # BLD AUTO: 0 K/UL — SIGNIFICANT CHANGE UP (ref 0–0.5)
EOSINOPHIL # BLD AUTO: 0.21 K/UL
EOSINOPHIL NFR BLD AUTO: 0 % — SIGNIFICANT CHANGE UP (ref 0–6)
EOSINOPHIL NFR BLD AUTO: 3.5 %
GLUCOSE BLDC GLUCOMTR-MCNC: 109 MG/DL — HIGH (ref 70–99)
GLUCOSE BLDC GLUCOMTR-MCNC: 112 MG/DL — HIGH (ref 70–99)
GLUCOSE BLDC GLUCOMTR-MCNC: 129 MG/DL — HIGH (ref 70–99)
GLUCOSE BLDC GLUCOMTR-MCNC: 196 MG/DL — HIGH (ref 70–99)
GLUCOSE SERPL-MCNC: 126 MG/DL — HIGH (ref 70–99)
H PYLORI AB SER-ACNC: 86.5 UNITS — HIGH
HCT VFR BLD CALC: 38.5 % — SIGNIFICANT CHANGE UP (ref 34.5–45)
HCT VFR BLD CALC: 40.1 %
HGB BLD-MCNC: 12.6 G/DL — SIGNIFICANT CHANGE UP (ref 11.5–15.5)
HGB BLD-MCNC: 13.2 G/DL
IMM GRANULOCYTES NFR BLD AUTO: 0.5 % — SIGNIFICANT CHANGE UP (ref 0–1.5)
LYMPHOCYTES # BLD AUTO: 0.57 K/UL — LOW (ref 1–3.3)
LYMPHOCYTES # BLD AUTO: 1.37 K/UL
LYMPHOCYTES # BLD AUTO: 9.7 % — LOW (ref 13–44)
LYMPHOCYTES NFR BLD AUTO: 22.6 %
MAGNESIUM SERPL-MCNC: 2.2 MG/DL — SIGNIFICANT CHANGE UP (ref 1.6–2.6)
MAN DIFF?: NORMAL
MCHC RBC-ENTMCNC: 31.7 PG
MCHC RBC-ENTMCNC: 32 PG — SIGNIFICANT CHANGE UP (ref 27–34)
MCHC RBC-ENTMCNC: 32.7 GM/DL — SIGNIFICANT CHANGE UP (ref 32–36)
MCHC RBC-ENTMCNC: 32.9 GM/DL
MCV RBC AUTO: 96.4 FL
MCV RBC AUTO: 97.7 FL — SIGNIFICANT CHANGE UP (ref 80–100)
MONOCYTES # BLD AUTO: 0.11 K/UL — SIGNIFICANT CHANGE UP (ref 0–0.9)
MONOCYTES # BLD AUTO: 0.16 K/UL
MONOCYTES NFR BLD AUTO: 1.9 % — LOW (ref 2–14)
MONOCYTES NFR BLD AUTO: 2.6 %
NEUTROPHILS # BLD AUTO: 4.32 K/UL
NEUTROPHILS # BLD AUTO: 5.18 K/UL — SIGNIFICANT CHANGE UP (ref 1.8–7.4)
NEUTROPHILS NFR BLD AUTO: 71.3 %
NEUTROPHILS NFR BLD AUTO: 87.7 % — HIGH (ref 43–77)
NRBC # BLD: 0 /100 WBCS — SIGNIFICANT CHANGE UP (ref 0–0)
PHOSPHATE SERPL-MCNC: 3.2 MG/DL — SIGNIFICANT CHANGE UP (ref 2.5–4.5)
PLATELET # BLD AUTO: 38 K/UL — LOW (ref 150–400)
PLATELET # BLD AUTO: 53 K/UL
POTASSIUM SERPL-MCNC: 4.1 MMOL/L — SIGNIFICANT CHANGE UP (ref 3.5–5.3)
POTASSIUM SERPL-SCNC: 4.1 MMOL/L — SIGNIFICANT CHANGE UP (ref 3.5–5.3)
RBC # BLD: 3.94 M/UL — SIGNIFICANT CHANGE UP (ref 3.8–5.2)
RBC # BLD: 4.16 M/UL
RBC # FLD: 12 %
RBC # FLD: 12 % — SIGNIFICANT CHANGE UP (ref 10.3–14.5)
SODIUM SERPL-SCNC: 135 MMOL/L — SIGNIFICANT CHANGE UP (ref 135–145)
TSH RECEPTOR AB: <1.1 IU/L
TSH SERPL-ACNC: 17.2 UIU/ML
WBC # BLD: 5.9 K/UL — SIGNIFICANT CHANGE UP (ref 3.8–10.5)
WBC # FLD AUTO: 5.9 K/UL — SIGNIFICANT CHANGE UP (ref 3.8–10.5)
WBC # FLD AUTO: 6.06 K/UL

## 2021-07-07 PROCEDURE — 99233 SBSQ HOSP IP/OBS HIGH 50: CPT | Mod: GC

## 2021-07-07 RX ADMIN — Medication 0.6 MILLIGRAM(S): at 12:32

## 2021-07-07 RX ADMIN — PANTOPRAZOLE SODIUM 40 MILLIGRAM(S): 20 TABLET, DELAYED RELEASE ORAL at 07:10

## 2021-07-07 RX ADMIN — Medication 120 MILLIGRAM(S): at 17:30

## 2021-07-07 RX ADMIN — Medication 137 MICROGRAM(S): at 07:10

## 2021-07-07 RX ADMIN — SERTRALINE 50 MILLIGRAM(S): 25 TABLET, FILM COATED ORAL at 12:32

## 2021-07-07 NOTE — PROGRESS NOTE ADULT - PROBLEM SELECTOR PLAN 7
F: none indicated  E: replete if K<4 or Mg<2  Diet: DASH/TLC diet  DVT prophylaxis: Lovenox not indicated in setting of severe thrombocytopenia
F: none indicated  E: replete if K<4 or Mg<2  Diet: DASH/TLC diet  DVT prophylaxis: Lovenox not indicated in setting of severe thrombocytopenia

## 2021-07-07 NOTE — PROGRESS NOTE ADULT - SUBJECTIVE AND OBJECTIVE BOX
LENGTH OF HOSPITAL STAY: Day 2 today (7/7/21)    SUBJECTIVE: No acute overnight events    HISTORY OF PRESENTING ILLNESS:   61F PMH ITP (Dx in June 2021 rx'ed w/ Steroids/IVIG), Hypothyroidism, HLD, Depression, arthritis, and macular degeneration presents from outpt Hematologist office w/ Plt count drop from 15k on 7/4 to 1k on 7/5. Of note pt was seen at [facility] ED on 7/4 after was found to have Plt<15k on outpt labs was dc'ed home as per recommendation of hematologist,  on Decadron x 3 days and outpt Hem f/up. Pt notes multiple large  bruising to b/l UE but has not increased over the past few days. Pt also suffers from long standing fatigue and diffuse joint stiffness, worse in back, knees and shoulder, that occurs in the morning and eventually improves after a couple of hours. On ROS denies fever, night sweats, gum bleeding, rash, joint swelling, chest pain, palpitation, SOB, abdominal pain, any recent illness, travel hx  and dysuria. Pt seen by H/O in the ED recommended IVIG and Steroids. Admitted to medicine for management of ITP.   Pt was admitted at [facility] in June and had extensive autoimmune/infx/Hem w/up that was grossly unremarkable and was instructed to f/u w/ Rheum and Hem as outpt. F/up w/ Rheum at [facility]    Home Medications: Naproxen 500 mg, Ezetimibe 10 mg, Olopatadine 0.2% eye drop, Sertraline 50 mg, Synthroid 137 mcg M-F, Synthroid 125 mcg S/S.     Mammogram in 2021 normal. Pap smear 2010, normal. Colonoscopy in 2015 or 2016 was normal.     PAST MEDICAL & SURGICAL HISTORY:  Hypothyroidism  HLD (hyperlipidemia)  History of ITP  Arthritis  No significant past surgical history    SOCIAL HISTORY:  Works at a law firm in administration  Does not smoke. Denies drug use. Takes Magnesium, Zinc, Calcium/Vitamin D3, Biotin and Lyrine   Mother: Had brain aneurysm in 1996  Father: Had small bladder tumor that was removed   Brother: Grave's ophthalmopathy   Denies family history of bleeding or clotting disorders.     ALLERGIES:  azithromycin (Rash)  penicillin (Rash)  vancomycin (Rash)    MEDICATIONS:  STANDING MEDICATIONS  acetaminophen   Tablet .. 650 milliGRAM(s) Oral once  dexAMETHasone  IVPB 40 milliGRAM(s) IV Intermittent every 24 hours  dextrose 40% Gel 15 Gram(s) Oral once  dextrose 5%. 1000 milliLiter(s) IV Continuous <Continuous>  dextrose 5%. 1000 milliLiter(s) IV Continuous <Continuous>  dextrose 50% Injectable 25 Gram(s) IV Push once  dextrose 50% Injectable 12.5 Gram(s) IV Push once  dextrose 50% Injectable 25 Gram(s) IV Push once  diphenhydrAMINE 50 milliGRAM(s) Oral once  glucagon  Injectable 1 milliGRAM(s) IntraMuscular once  immune   globulin 10% (GAMMAGARD) IVPB 65 Gram(s) IV Intermittent once  insulin lispro (ADMELOG) corrective regimen sliding scale   SubCutaneous three times a day before meals  insulin lispro (ADMELOG) corrective regimen sliding scale   SubCutaneous at bedtime  levothyroxine 125 MICROGram(s) Oral <User Schedule>  levothyroxine 137 MICROGram(s) Oral <User Schedule>  pantoprazole    Tablet 40 milliGRAM(s) Oral before breakfast  sertraline 50 milliGRAM(s) Oral daily    PRN MEDICATIONS    VITALS:     T(C): 36.9 (07 Jul 2021 06:14), Max: 36.9 (07 Jul 2021 06:14)  T(F): 98.4 (07 Jul 2021 06:14), Max: 98.4 (07 Jul 2021 06:14)  HR: 78 (07 Jul 2021 06:14) (65 - 79)  BP: 118/66 (07 Jul 2021 06:14) (117/78 - 138/95)  BP(mean): --  RR: 19 (07 Jul 2021 06:14) (17 - 19)  SpO2: 96% (07 Jul 2021 06:14) (94% - 97%)    LABS:  WBC 5.9, Hgb 12.6, Plt count 38  C and LFTs normal    RADIOLOGY:  None     PHYSICAL EXAM:  GEN: No acute distress  HEENT: NCAT  LUNGS: Clear to auscultation bilaterally   HEART: S1/S2 present. RRR.   ABD: Soft, non-tender, non-distended. Bowel sounds present. Bruising on torso/abdomen/arms/legs. One small posterior palatal petechiae. No hepatosplenomegaly.   EXT: No pitting edema  NEURO: AAOX3

## 2021-07-07 NOTE — PROGRESS NOTE ADULT - PROBLEM SELECTOR PLAN 5
Known depression. Takes sertraline 50mg qD at home.   -C/w home meds
Known depression. Takes sertraline 50mg qD at home.   -C/w home meds

## 2021-07-07 NOTE — PROGRESS NOTE ADULT - PROBLEM SELECTOR PLAN 2
Initially admitted 6/23-6/25 for thrombocytopenia, probable ITP as new diagnosis.  -See above for plan
Initially admitted 6/23-6/25 for thrombocytopenia, probable ITP as new diagnosis.  -See above for plan

## 2021-07-07 NOTE — PROGRESS NOTE ADULT - PROBLEM SELECTOR PLAN 3
Chronic joint stiffness of entire body including back and knees. Following outpatient rheumatologist Dr. Dayl for arthritis in setting of thrombocytopenia. ERIN 1:80 on 6/24. Anti-dsDNA, anti-RNP, anti-Smith, rheumatoid factor negative 6/24.   -appreciate hem/onc recs, will not consult rheum at this time  -started colchicine 0.6mg qD for inflammation per rheumatology recs Chronic joint stiffness of entire body including back and knees. Following outpatient rheumatologist Dr. Daly for arthritis in setting of thrombocytopenia. ERIN 1:80 on 6/24. Anti-dsDNA, anti-RNP, anti-Smith, rheumatoid factor negative 6/24.   -appreciate hem/onc recs, will not consult rheum at this time  -started colchicine 0.6mg qD for inflammation per rheumatology recs  -pt has follow up with outpatient rheum scheduled

## 2021-07-07 NOTE — PROGRESS NOTE ADULT - PROBLEM SELECTOR PLAN 6
Known HLD. Takes Ezetimibe 10mg qD at home.   -C/w home meds
Known HLD. Takes Ezetimibe 10mg qD at home.   -C/w home meds

## 2021-07-07 NOTE — PROGRESS NOTE ADULT - ASSESSMENT
60 y/o F, with a past medical history of hypothyroidism, HLD, depression, arthritis, ITP (new diagnosis, initially admitted 6/23, seen in ED 7/4) presented to the ED 7/5 after plt count "<1" at heme/onc Dr. Fulton office, now admitted for further management of thrombocytopenia/ITP.  60 y/o F, with a past medical history of hypothyroidism, HLD, depression, arthritis, ITP (new diagnosis, initially admitted 6/23, seen in ED 7/4) presented to the ED 7/5 after plt count "<1" at heme/onc Dr. Fulton office, found to be thrombocytopenic, admitted 7/5 for further management of thrombocytopenia 2/2 to ITP, now with improving thrombocytopenia.  60 y/o F, with a past medical history of hypothyroidism, HLD, depression, arthritis, ITP (new diagnosis, initially admitted 6/23, seen in ED 7/4) sent to ED 7/5 by heme/onc outpt for plt count "<1", found to be thrombocytopenic, admitted 7/5 for further management of thrombocytopenia 2/2 to ITP, now with improving thrombocytopenia.

## 2021-07-07 NOTE — PROGRESS NOTE ADULT - SUBJECTIVE AND OBJECTIVE BOX
**INCOMPLETE NOTE    OVERNIGHT EVENTS:    SUBJECTIVE:  Patient seen and examined at bedside.    Vital Signs Last 12 Hrs  T(F): 98.4 (07-07-21 @ 06:14), Max: 98.4 (07-07-21 @ 06:14)  HR: 78 (07-07-21 @ 06:14) (71 - 78)  BP: 118/66 (07-07-21 @ 06:14) (118/66 - 138/95)  BP(mean): --  RR: 19 (07-07-21 @ 06:14) (17 - 19)  SpO2: 96% (07-07-21 @ 06:14) (96% - 96%)  I&O's Summary      PHYSICAL EXAM:  Constitutional: NAD, comfortable in bed.  HEENT: NC/AT, PERRLA, EOMI, no conjunctival pallor or scleral icterus, MMM  Neck: Supple, no JVD  Respiratory: CTA B/L. No w/r/r.   Cardiovascular: RRR, normal S1 and S2, no m/r/g.   Gastrointestinal: +BS, soft NTND, no guarding or rebound tenderness, no palpable masses   Extremities: wwp; no cyanosis, clubbing or edema.   Vascular: Pulses equal and strong throughout.   Neurological: AAOx3, no CN deficits, strength and sensation intact throughout.   Skin: No gross skin abnormalities or rashes        LABS:                        12.6   5.90  )-----------( 38       ( 07 Jul 2021 06:56 )             38.5     07-07    135  |  106  |  24<H>  ----------------------------<  126<H>  4.1   |  22  |  1.01    Ca    8.8      07 Jul 2021 06:56  Phos  3.2     07-07  Mg     2.2     07-07    TPro  9.3<H>  /  Alb  3.4  /  TBili  0.6  /  DBili  x   /  AST  33  /  ALT  25  /  AlkPhos  62  07-06    PT/INR - ( 05 Jul 2021 17:09 )   PT: 12.4 sec;   INR: 1.04          PTT - ( 05 Jul 2021 17:09 )  PTT:21.6 sec        RADIOLOGY & ADDITIONAL TESTS:    MEDICATIONS  (STANDING):  colchicine 0.6 milliGRAM(s) Oral daily  dexAMETHasone  IVPB 40 milliGRAM(s) IV Intermittent every 24 hours  dextrose 40% Gel 15 Gram(s) Oral once  dextrose 5%. 1000 milliLiter(s) (50 mL/Hr) IV Continuous <Continuous>  dextrose 5%. 1000 milliLiter(s) (100 mL/Hr) IV Continuous <Continuous>  dextrose 50% Injectable 25 Gram(s) IV Push once  dextrose 50% Injectable 12.5 Gram(s) IV Push once  dextrose 50% Injectable 25 Gram(s) IV Push once  glucagon  Injectable 1 milliGRAM(s) IntraMuscular once  insulin lispro (ADMELOG) corrective regimen sliding scale   SubCutaneous Before meals and at bedtime  levothyroxine 125 MICROGram(s) Oral <User Schedule>  levothyroxine 137 MICROGram(s) Oral <User Schedule>  pantoprazole    Tablet 40 milliGRAM(s) Oral before breakfast  sertraline 50 milliGRAM(s) Oral daily    MEDICATIONS  (PRN):   **INCOMPLETE NOTE    OVERNIGHT EVENTS: No acute events.     SUBJECTIVE: 60 y/o F, with a past medical history of hypothyroidism, HLD, depression, arthritis, ITP (new diagnosis, initially admitted 6/23, seen in ED 7/4) presented to the ED 7/5 after plt count "<1" at heme/onc Dr. Fulton office, found to be thrombocytopenic, now on day 3 of admission. Pt stated that she is feeling well with no symptoms or concerns currently. Pt stated that there is no progression of the bruises on her arms.  Pt denied any weakness, dizziness, hematuria, gingival bleeding, melena, diarrhea. Pt noted that she had some symptoms of restless leg syndrome last night but was relieved by walking around.Patient seen and examined at bedside.    ROS: All other ROS negative.     Vital Signs Last 12 Hrs  T(F): 98.4 (07-07-21 @ 06:14), Max: 98.4 (07-07-21 @ 06:14)  HR: 78 (07-07-21 @ 06:14) (71 - 78)  BP: 118/66 (07-07-21 @ 06:14) (118/66 - 138/95)  BP(mean): --  RR: 19 (07-07-21 @ 06:14) (17 - 19)  SpO2: 96% (07-07-21 @ 06:14) (96% - 96%)  I&O's Summary      PHYSICAL EXAM:  Constitutional: NAD, comfortable in bed.  HEENT: NC/AT, PERRLA, EOMI, no conjunctival pallor or scleral icterus, MMM. One small blister noted midline on palate with no change from yesterday.   Neck: Supple, no JVD  Respiratory: CTA B/L. No w/r/r.   Cardiovascular: RRR, normal S1 and S2, no m/r/g.   Gastrointestinal: +BS, soft NTND, no guarding or rebound tenderness, no palpable masses   Extremities: wwp; no cyanosis, clubbing or edema. Diffuse large bruises and petechiae on bilateral upper extremities; petechiae diffusely on lower extremities.   Vascular: Pulses equal and strong throughout.   Neurological: AAOx3, no CN deficits, strength and sensation intact throughout.   Skin: See extremities.         LABS:                        12.6   5.90  )-----------( 38       ( 07 Jul 2021 06:56 )             38.5     07-07    135  |  106  |  24<H>  ----------------------------<  126<H>  4.1   |  22  |  1.01    Ca    8.8      07 Jul 2021 06:56  Phos  3.2     07-07  Mg     2.2     07-07    TPro  9.3<H>  /  Alb  3.4  /  TBili  0.6  /  DBili  x   /  AST  33  /  ALT  25  /  AlkPhos  62  07-06    PT/INR - ( 05 Jul 2021 17:09 )   PT: 12.4 sec;   INR: 1.04          PTT - ( 05 Jul 2021 17:09 )  PTT:21.6 sec        RADIOLOGY & ADDITIONAL TESTS:    MEDICATIONS  (STANDING):  colchicine 0.6 milliGRAM(s) Oral daily  dexAMETHasone  IVPB 40 milliGRAM(s) IV Intermittent every 24 hours  dextrose 40% Gel 15 Gram(s) Oral once  dextrose 5%. 1000 milliLiter(s) (50 mL/Hr) IV Continuous <Continuous>  dextrose 5%. 1000 milliLiter(s) (100 mL/Hr) IV Continuous <Continuous>  dextrose 50% Injectable 25 Gram(s) IV Push once  dextrose 50% Injectable 12.5 Gram(s) IV Push once  dextrose 50% Injectable 25 Gram(s) IV Push once  glucagon  Injectable 1 milliGRAM(s) IntraMuscular once  insulin lispro (ADMELOG) corrective regimen sliding scale   SubCutaneous Before meals and at bedtime  levothyroxine 125 MICROGram(s) Oral <User Schedule>  levothyroxine 137 MICROGram(s) Oral <User Schedule>  pantoprazole    Tablet 40 milliGRAM(s) Oral before breakfast  sertraline 50 milliGRAM(s) Oral daily    MEDICATIONS  (PRN):   **INCOMPLETE NOTE    OVERNIGHT EVENTS: No acute events.     SUBJECTIVE: 62 y/o F, with a past medical history of hypothyroidism, HLD, depression, arthritis, ITP (new diagnosis, initially admitted 6/23, seen in ED 7/4) presented to the ED 7/5 after plt count "<1" at heme/onc Dr. Fulton office, found to be thrombocytopenic, now on day 3 of admission. Pt stated that she is feeling well with no symptoms or concerns currently. Pt stated that there is no progression of the bruises on her arms.  Pt denied any weakness, dizziness, hematuria, gingival bleeding, melena, diarrhea. Pt noted that she had some symptoms of restless leg syndrome last night but was relieved by walking around. Patient seen and examined at bedside.    ROS: All other ROS negative.     Vital Signs Last 12 Hrs  T(F): 98.4 (07-07-21 @ 06:14), Max: 98.4 (07-07-21 @ 06:14)  HR: 78 (07-07-21 @ 06:14) (71 - 78)  BP: 118/66 (07-07-21 @ 06:14) (118/66 - 138/95)  BP(mean): --  RR: 19 (07-07-21 @ 06:14) (17 - 19)  SpO2: 96% (07-07-21 @ 06:14) (96% - 96%)  I&O's Summary      PHYSICAL EXAM:  Constitutional: NAD, comfortable in bed.  HEENT: NC/AT, PERRLA, EOMI, no conjunctival pallor or scleral icterus, MMM. One small blister noted midline on palate with no change from yesterday.   Neck: Supple, no JVD  Respiratory: CTA B/L. No w/r/r.   Cardiovascular: RRR, normal S1 and S2, no m/r/g.   Gastrointestinal: +BS, soft NTND, no guarding or rebound tenderness, no palpable masses   Extremities: wwp; no cyanosis, clubbing or edema. Diffuse large bruises and petechiae on bilateral upper extremities; petechiae diffusely on lower extremities.   Vascular: Pulses equal and strong throughout.   Neurological: AAOx3, no CN deficits, strength and sensation intact throughout.   Skin: See extremities.         LABS:                        12.6   5.90  )-----------( 38       ( 07 Jul 2021 06:56 )             38.5     07-07    135  |  106  |  24<H>  ----------------------------<  126<H>  4.1   |  22  |  1.01    Ca    8.8      07 Jul 2021 06:56  Phos  3.2     07-07  Mg     2.2     07-07    TPro  9.3<H>  /  Alb  3.4  /  TBili  0.6  /  DBili  x   /  AST  33  /  ALT  25  /  AlkPhos  62  07-06    PT/INR - ( 05 Jul 2021 17:09 )   PT: 12.4 sec;   INR: 1.04          PTT - ( 05 Jul 2021 17:09 )  PTT:21.6 sec        RADIOLOGY & ADDITIONAL TESTS:    MEDICATIONS  (STANDING):  colchicine 0.6 milliGRAM(s) Oral daily  dexAMETHasone  IVPB 40 milliGRAM(s) IV Intermittent every 24 hours  dextrose 40% Gel 15 Gram(s) Oral once  dextrose 5%. 1000 milliLiter(s) (50 mL/Hr) IV Continuous <Continuous>  dextrose 5%. 1000 milliLiter(s) (100 mL/Hr) IV Continuous <Continuous>  dextrose 50% Injectable 25 Gram(s) IV Push once  dextrose 50% Injectable 12.5 Gram(s) IV Push once  dextrose 50% Injectable 25 Gram(s) IV Push once  glucagon  Injectable 1 milliGRAM(s) IntraMuscular once  insulin lispro (ADMELOG) corrective regimen sliding scale   SubCutaneous Before meals and at bedtime  levothyroxine 125 MICROGram(s) Oral <User Schedule>  levothyroxine 137 MICROGram(s) Oral <User Schedule>  pantoprazole    Tablet 40 milliGRAM(s) Oral before breakfast  sertraline 50 milliGRAM(s) Oral daily    MEDICATIONS  (PRN):   **INCOMPLETE NOTE    OVERNIGHT EVENTS: No acute events.     SUBJECTIVE: 62 y/o F, with a past medical history of hypothyroidism, HLD, depression, arthritis, ITP (new diagnosis, initially admitted 6/23, seen in ED 7/4) presented to the ED 7/5 after plt count "<1" at heme/onc Dr. Fulton office, found to be thrombocytopenic, now on day 3 of admission. Pt stated that she is feeling well with no symptoms or concerns currently. Pt stated that there is no progression of the bruises on her arms.  Pt denied any weakness, dizziness, hematuria, gingival bleeding, melena, diarrhea. Pt also denied any joint stiffness including back and knees. Pt noted that she had some symptoms of restless leg syndrome last night but was relieved by walking around. Patient seen and examined at bedside.    ROS: All other ROS negative.     Vital Signs Last 12 Hrs  T(F): 98.4 (07-07-21 @ 06:14), Max: 98.4 (07-07-21 @ 06:14)  HR: 78 (07-07-21 @ 06:14) (71 - 78)  BP: 118/66 (07-07-21 @ 06:14) (118/66 - 138/95)  BP(mean): --  RR: 19 (07-07-21 @ 06:14) (17 - 19)  SpO2: 96% (07-07-21 @ 06:14) (96% - 96%)  I&O's Summary      PHYSICAL EXAM:  Constitutional: NAD, comfortable in bed.  HEENT: NC/AT, PERRLA, EOMI, no conjunctival pallor or scleral icterus, MMM. One small blister noted midline on palate with no change from yesterday.   Neck: Supple, no JVD  Respiratory: CTA B/L. No w/r/r.   Cardiovascular: RRR, normal S1 and S2, no m/r/g.   Gastrointestinal: +BS, soft NTND, no guarding or rebound tenderness, no palpable masses   Extremities: wwp; no cyanosis, clubbing or edema. Diffuse large bruises and petechiae on bilateral upper extremities; petechiae diffusely on lower extremities.   Vascular: Pulses equal and strong throughout.   Neurological: AAOx3, no CN deficits, strength and sensation intact throughout.   Skin: See extremities.         LABS:                        12.6   5.90  )-----------( 38       ( 07 Jul 2021 06:56 )             38.5     07-07    135  |  106  |  24<H>  ----------------------------<  126<H>  4.1   |  22  |  1.01    Ca    8.8      07 Jul 2021 06:56  Phos  3.2     07-07  Mg     2.2     07-07    TPro  9.3<H>  /  Alb  3.4  /  TBili  0.6  /  DBili  x   /  AST  33  /  ALT  25  /  AlkPhos  62  07-06    PT/INR - ( 05 Jul 2021 17:09 )   PT: 12.4 sec;   INR: 1.04          PTT - ( 05 Jul 2021 17:09 )  PTT:21.6 sec        RADIOLOGY & ADDITIONAL TESTS:    MEDICATIONS  (STANDING):  colchicine 0.6 milliGRAM(s) Oral daily  dexAMETHasone  IVPB 40 milliGRAM(s) IV Intermittent every 24 hours  dextrose 40% Gel 15 Gram(s) Oral once  dextrose 5%. 1000 milliLiter(s) (50 mL/Hr) IV Continuous <Continuous>  dextrose 5%. 1000 milliLiter(s) (100 mL/Hr) IV Continuous <Continuous>  dextrose 50% Injectable 25 Gram(s) IV Push once  dextrose 50% Injectable 12.5 Gram(s) IV Push once  dextrose 50% Injectable 25 Gram(s) IV Push once  glucagon  Injectable 1 milliGRAM(s) IntraMuscular once  insulin lispro (ADMELOG) corrective regimen sliding scale   SubCutaneous Before meals and at bedtime  levothyroxine 125 MICROGram(s) Oral <User Schedule>  levothyroxine 137 MICROGram(s) Oral <User Schedule>  pantoprazole    Tablet 40 milliGRAM(s) Oral before breakfast  sertraline 50 milliGRAM(s) Oral daily    MEDICATIONS  (PRN):

## 2021-07-07 NOTE — PROGRESS NOTE ADULT - PROBLEM SELECTOR PLAN 1
Initially admitted 6/23-6/25 for thrombocytopenia, probably ITP as diagnosis. Came to ED 7/4 for thrombocytopenia on outpatient rheum labs, discharged after one dose of dexamethasone 40 mg in ED with 4 day course total. Now came back to ED 7/5 for thrombocytopenia "plt <1" as per patient on outpatient heme/onc labs. In ED, received another dose of dexamethasone 40mg and 65mg IVIG.   -Heme/onc recs  -C/w IV Decadron 40mg qD (Day 3 of 4)  -C/w IVIG 65mg qD (Day 2 of 2)  -Rheum consult in setting of thrombocytopenia and joint stiffness  -H pylori antibody  -Lyme PCR  -Ehrlichia/Anaplasmosis PCR  -Tick borne disease panel  -US abdomen to eval for hepatosplenomegaly  -Monitor CBC w/ diff  -Maintain active T + S, transfuse platelets if < 10K or < 20K if febrile or < 50K if clinically important bleeding  -Upon improvement and stabilization of platelets > 50K, f/u with pt outpatient heme/onc Dr. Fulton in 1 week Initially admitted 6/23-6/25 for thrombocytopenia, probably ITP as diagnosis. Came to ED 7/4 for thrombocytopenia on outpatient rheum labs, discharged after one dose of dexamethasone 40 mg in ED with 4 day course total. Now came back to ED 7/5 for thrombocytopenia "plt <1" as per patient on outpatient heme/onc labs. In ED, received another dose of dexamethasone 40mg and 65mg IVIG. Received day 3 of 4 of dexamethasone 40mg IV. Received day 2 of 2 65mg IVIG. Thrombocytopenia improved from plt 4 (7/5) to plt 38 (7/7).   -Appreciate heme/onc recs  -C/w IV Decadron 40mg qD (Day 4 of 4)  -F/u H pylori antibody  -F/u Lyme PCR  -F/u Ehrlichia/Anaplasmosis PCR  -F/u Tick borne disease panel  -F/u US abdomen to eval for hepatosplenomegaly  -Monitor CBC w/ diff  -Maintain active T + S, transfuse platelets if < 10K or < 20K if febrile or < 50K if clinically important bleeding  -Upon improvement and stabilization of platelets > 50K, f/u with pt outpatient heme/onc Dr. Fulton scheduled Initially admitted 6/23-6/25 for thrombocytopenia, probably ITP as diagnosis. Came to ED 7/4 for thrombocytopenia on outpatient rheum labs, discharged after one dose of dexamethasone 40 mg in ED with 4 day course total. Now came back to ED 7/5 for thrombocytopenia "plt <1" as per patient on outpatient heme/onc labs. In ED, received another dose of dexamethasone 40mg and 65mg IVIG. Received day 3 of 4 of dexamethasone 40mg IV. Received day 2 of 2 65mg IVIG. Thrombocytopenia improved from plt 4 (7/5) to plt 38 (7/7).   -Appreciate heme/onc recs  -C/w IV Decadron 40mg qD (Day 4 of 4)  -F/u H pylori antibody  -F/u Lyme PCR  -F/u Ehrlichia/Anaplasmosis PCR  -F/u Tick borne disease panel  -F/u US abdomen to eval for hepatosplenomegaly  -Monitor CBC w/ diff  -Maintain active T + S, transfuse platelets if clinically important bleeding develops  -Upon improvement and stabilization of platelets > 50K, f/u with pt outpatient heme/onc Dr. Fulton scheduled Initially admitted 6/23-6/25 for thrombocytopenia, probably ITP as diagnosis. Came to ED 7/4 for thrombocytopenia on outpatient rheum labs, discharged after one dose of dexamethasone 40 mg in ED with 4 day course total. Now came back to ED 7/5 for thrombocytopenia "plt <1" as per patient on outpatient heme/onc labs. In ED, received another dose of dexamethasone 40mg and 65mg IVIG. Received day 3 of 4 of dexamethasone 40mg IV. Received day 2 of 2 65mg IVIG. Thrombocytopenia improved from plt 4 (7/5) to plt 38 (7/7). Ultrasound shows no hepatosplenomegaly.   -Appreciate heme/onc recs  -C/w IV Decadron 40mg qD (Day 4 of 4)  -F/u H pylori antibody  -F/u Lyme PCR  -F/u Ehrlichia/Anaplasmosis PCR  -F/u Tick borne disease panel  -Monitor CBC w/ diff  -Maintain active T + S, transfuse platelets if clinically important bleeding develops  -Upon improvement and stabilization of platelets > 50K, f/u with pt outpatient heme/onc Dr. Fulton scheduled

## 2021-07-07 NOTE — PROGRESS NOTE ADULT - PROBLEM SELECTOR PLAN 4
Known hypothyroidism. Takes levothyroxine 137mcg 5 times a week and 125mcg 2 times a week at home.   -C/w home meds
Known hypothyroidism. Takes levothyroxine 137mcg 5 times a week and 125mcg 2 times a week at home.   -C/w home meds

## 2021-07-07 NOTE — PROGRESS NOTE ADULT - ASSESSMENT
60 yo F, [place], with PMHx of hypothyroidism, hyperlipidemia, anxiety/depression, macular degeneration, bilateral knee osteoarthritis, was sent to ED for low platelets. Patient has had bruising on her arms/legs/back/torso. Hematology consulted for thrombocytopenia.    #) DIAGNOSIS  - Severe thrombocytopenia, likely ITP, improving     #) PLAN  - While the patient presents with bruising in the extremities/abdomen/back, this is not considered a clinically significant bleed. She has a small palatal petechiae. Please check for expanding palatal petechiae or blood blister in the mouth daily; new onset may suggest lack of clinical improvement and necessitate CT Head non-contrast to evaluate for intracranial hemorrhage.   - Continue with IV Decadron 40 mg once daily (Day 4 of 4 today (7/7/21)). S/p IVIG x2 on 7/5 and 7/6.  - Plt count did improve on treatment, up to 38 from 4.  - Peripheral flow cytometry (06/23) did not show any circulating blasts or abnormal cell population. HIV and HCV negative. RF WNL, B12/Folate WNL. TSH grossly elevated consistent with a diagnosis of hypothyroidism, poorly controlled, consider Endocrinology consult. Obtain Rheumatology consult given clinical presentation suggestive of polymyositis or polymyalgia rheumatica, particularly in relation to bilateral stiff shoulder joints/hip girdle, muscle/joint pain, and ERIN 1:80 (could be falsely low given steroids). Send H. pylori serum antibody or H. pylori stool antigen. Evaluate for tick-borne illnesses (Anaplasma, Erlichia, Lyme).   - Home Medications reviewed: Naproxen can cause drug-induced ITP. However, she has not taken this medication since last discharge.   - Obtain US Abdomen to evaluate for hepatosplenomegaly.   - Monitor CBC with differential once daily.   - Avoid plt transfusion unless active bleed develops.    - Upon improvement and stabilization of platelets > 50K, can discharge to see Dr. Azalia Schrader at [facility] [address] [place] [address] () in 1 week     Mally Mrecado MD  [phone]

## 2021-07-08 ENCOUNTER — TRANSCRIPTION ENCOUNTER (OUTPATIENT)
Age: 61
End: 2021-07-08

## 2021-07-08 VITALS
DIASTOLIC BLOOD PRESSURE: 86 MMHG | HEART RATE: 70 BPM | RESPIRATION RATE: 18 BRPM | SYSTOLIC BLOOD PRESSURE: 147 MMHG | TEMPERATURE: 98 F | OXYGEN SATURATION: 95 %

## 2021-07-08 LAB
A PHAGOCYTOPH DNA BLD QL NAA+PROBE: NEGATIVE — SIGNIFICANT CHANGE UP
A PHAGOCYTOPH IGG TITR SER IF: SIGNIFICANT CHANGE UP TITER
ANION GAP SERPL CALC-SCNC: 8 MMOL/L — SIGNIFICANT CHANGE UP (ref 5–17)
B BURGDOR AB SER QL IA: NEGATIVE — SIGNIFICANT CHANGE UP
B MICROTI IGG TITR SER: SIGNIFICANT CHANGE UP TITER
BLD GP AB SCN SERPL QL: NEGATIVE — SIGNIFICANT CHANGE UP
BUN SERPL-MCNC: 21 MG/DL — SIGNIFICANT CHANGE UP (ref 7–23)
CALCIUM SERPL-MCNC: 8.7 MG/DL — SIGNIFICANT CHANGE UP (ref 8.4–10.5)
CHLORIDE SERPL-SCNC: 106 MMOL/L — SIGNIFICANT CHANGE UP (ref 96–108)
CO2 SERPL-SCNC: 22 MMOL/L — SIGNIFICANT CHANGE UP (ref 22–31)
CREAT SERPL-MCNC: 0.84 MG/DL — SIGNIFICANT CHANGE UP (ref 0.5–1.3)
E CHAFFEENSIS DNA BLD QL NAA+PROBE: NEGATIVE — SIGNIFICANT CHANGE UP
E CHAFFEENSIS IGG TITR SER IF: SIGNIFICANT CHANGE UP TITER
E EWINGII DNA SPEC QL NAA+PROBE: NEGATIVE — SIGNIFICANT CHANGE UP
EHRLICHIA DNA SPEC QL NAA+PROBE: NEGATIVE — SIGNIFICANT CHANGE UP
GLUCOSE BLDC GLUCOMTR-MCNC: 104 MG/DL — HIGH (ref 70–99)
GLUCOSE BLDC GLUCOMTR-MCNC: 99 MG/DL — SIGNIFICANT CHANGE UP (ref 70–99)
GLUCOSE SERPL-MCNC: 101 MG/DL — HIGH (ref 70–99)
HCT VFR BLD CALC: 38.6 % — SIGNIFICANT CHANGE UP (ref 34.5–45)
HGB BLD-MCNC: 12.7 G/DL — SIGNIFICANT CHANGE UP (ref 11.5–15.5)
MAGNESIUM SERPL-MCNC: 2.2 MG/DL — SIGNIFICANT CHANGE UP (ref 1.6–2.6)
MCHC RBC-ENTMCNC: 31.8 PG — SIGNIFICANT CHANGE UP (ref 27–34)
MCHC RBC-ENTMCNC: 32.9 GM/DL — SIGNIFICANT CHANGE UP (ref 32–36)
MCV RBC AUTO: 96.5 FL — SIGNIFICANT CHANGE UP (ref 80–100)
NRBC # BLD: 0 /100 WBCS — SIGNIFICANT CHANGE UP (ref 0–0)
PHOSPHATE SERPL-MCNC: 3 MG/DL — SIGNIFICANT CHANGE UP (ref 2.5–4.5)
PLATELET # BLD AUTO: 67 K/UL — LOW (ref 150–400)
POTASSIUM SERPL-MCNC: 4 MMOL/L — SIGNIFICANT CHANGE UP (ref 3.5–5.3)
POTASSIUM SERPL-SCNC: 4 MMOL/L — SIGNIFICANT CHANGE UP (ref 3.5–5.3)
RBC # BLD: 4 M/UL — SIGNIFICANT CHANGE UP (ref 3.8–5.2)
RBC # FLD: 11.9 % — SIGNIFICANT CHANGE UP (ref 10.3–14.5)
RH IG SCN BLD-IMP: POSITIVE — SIGNIFICANT CHANGE UP
SODIUM SERPL-SCNC: 136 MMOL/L — SIGNIFICANT CHANGE UP (ref 135–145)
WBC # BLD: 6.5 K/UL — SIGNIFICANT CHANGE UP (ref 3.8–10.5)
WBC # FLD AUTO: 6.5 K/UL — SIGNIFICANT CHANGE UP (ref 3.8–10.5)

## 2021-07-08 PROCEDURE — 85652 RBC SED RATE AUTOMATED: CPT

## 2021-07-08 PROCEDURE — 86677 HELICOBACTER PYLORI ANTIBODY: CPT

## 2021-07-08 PROCEDURE — 80053 COMPREHEN METABOLIC PANEL: CPT

## 2021-07-08 PROCEDURE — 36415 COLL VENOUS BLD VENIPUNCTURE: CPT

## 2021-07-08 PROCEDURE — 85730 THROMBOPLASTIN TIME PARTIAL: CPT

## 2021-07-08 PROCEDURE — 82962 GLUCOSE BLOOD TEST: CPT

## 2021-07-08 PROCEDURE — 76700 US EXAM ABDOM COMPLETE: CPT

## 2021-07-08 PROCEDURE — 86900 BLOOD TYPING SEROLOGIC ABO: CPT

## 2021-07-08 PROCEDURE — 86901 BLOOD TYPING SEROLOGIC RH(D): CPT

## 2021-07-08 PROCEDURE — 84443 ASSAY THYROID STIM HORMONE: CPT

## 2021-07-08 PROCEDURE — 82746 ASSAY OF FOLIC ACID SERUM: CPT

## 2021-07-08 PROCEDURE — 86753 PROTOZOA ANTIBODY NOS: CPT

## 2021-07-08 PROCEDURE — 86850 RBC ANTIBODY SCREEN: CPT

## 2021-07-08 PROCEDURE — 83605 ASSAY OF LACTIC ACID: CPT

## 2021-07-08 PROCEDURE — 86769 SARS-COV-2 COVID-19 ANTIBODY: CPT

## 2021-07-08 PROCEDURE — 87635 SARS-COV-2 COVID-19 AMP PRB: CPT

## 2021-07-08 PROCEDURE — 86618 LYME DISEASE ANTIBODY: CPT

## 2021-07-08 PROCEDURE — 87798 DETECT AGENT NOS DNA AMP: CPT

## 2021-07-08 PROCEDURE — 86140 C-REACTIVE PROTEIN: CPT

## 2021-07-08 PROCEDURE — 82607 VITAMIN B-12: CPT

## 2021-07-08 PROCEDURE — 93005 ELECTROCARDIOGRAM TRACING: CPT

## 2021-07-08 PROCEDURE — 99239 HOSP IP/OBS DSCHRG MGMT >30: CPT | Mod: GC

## 2021-07-08 PROCEDURE — 96374 THER/PROPH/DIAG INJ IV PUSH: CPT

## 2021-07-08 PROCEDURE — 83036 HEMOGLOBIN GLYCOSYLATED A1C: CPT

## 2021-07-08 PROCEDURE — 99285 EMERGENCY DEPT VISIT HI MDM: CPT

## 2021-07-08 PROCEDURE — 82010 KETONE BODYS QUAN: CPT

## 2021-07-08 PROCEDURE — 86666 EHRLICHIA ANTIBODY: CPT

## 2021-07-08 PROCEDURE — 84100 ASSAY OF PHOSPHORUS: CPT

## 2021-07-08 PROCEDURE — 85025 COMPLETE CBC W/AUTO DIFF WBC: CPT

## 2021-07-08 PROCEDURE — 83735 ASSAY OF MAGNESIUM: CPT

## 2021-07-08 PROCEDURE — 80048 BASIC METABOLIC PNL TOTAL CA: CPT

## 2021-07-08 PROCEDURE — 85610 PROTHROMBIN TIME: CPT

## 2021-07-08 PROCEDURE — 85027 COMPLETE CBC AUTOMATED: CPT

## 2021-07-08 RX ORDER — COLCHICINE 0.6 MG
1 TABLET ORAL
Qty: 0 | Refills: 0 | DISCHARGE
Start: 2021-07-08

## 2021-07-08 RX ORDER — DEXAMETHASONE 0.5 MG/5ML
40 ELIXIR ORAL ONCE
Refills: 0 | Status: COMPLETED | OUTPATIENT
Start: 2021-07-08 | End: 2021-07-08

## 2021-07-08 RX ADMIN — SERTRALINE 50 MILLIGRAM(S): 25 TABLET, FILM COATED ORAL at 12:44

## 2021-07-08 RX ADMIN — Medication 0.6 MILLIGRAM(S): at 12:44

## 2021-07-08 RX ADMIN — PANTOPRAZOLE SODIUM 40 MILLIGRAM(S): 20 TABLET, DELAYED RELEASE ORAL at 06:32

## 2021-07-08 RX ADMIN — Medication 120 MILLIGRAM(S): at 15:26

## 2021-07-08 RX ADMIN — Medication 137 MICROGRAM(S): at 06:32

## 2021-07-08 NOTE — DISCHARGE NOTE NURSING/CASE MANAGEMENT/SOCIAL WORK - PATIENT PORTAL LINK FT
You can access the FollowMyHealth Patient Portal offered by [facility] by registering at the following website: [email or web address]. By joining [facility]’s FollowMyHealth portal, you will also be able to view your health information using other applications (apps) compatible with our system.

## 2021-07-08 NOTE — PROGRESS NOTE ADULT - ASSESSMENT
62 yo F, [place], with PMHx of hypothyroidism, hyperlipidemia, anxiety/depression, macular degeneration, bilateral knee osteoarthritis, was sent to ED for low platelets. Patient has had bruising on her arms/legs/back/torso. Hematology consulted for thrombocytopenia.    #) DIAGNOSIS  - Severe thrombocytopenia, likely ITP, improving     #) PLAN  - While the patient presents with bruising in the extremities/abdomen/back, this is not considered a clinically significant bleed. She has a small palatal petechiae, healing. Please check for expanding palatal petechiae or blood blister in the mouth daily; new onset may suggest lack of clinical improvement and necessitate CT Head non-contrast to evaluate for intracranial hemorrhage.   - Completed IV Decadron 40 mg once daily. S/p IVIG x2 on 7/5 and 7/6.  - Plt count did improve on treatment, up to 67 from 4.  - Peripheral flow cytometry (06/23) did not show any circulating blasts or abnormal cell population. HIV and HCV negative. RF WNL, B12/Folate WNL. TSH grossly elevated consistent with a diagnosis of hypothyroidism, poorly controlled, consider Endocrinology consult. Obtain Rheumatology consult given clinical presentation suggestive of polymyositis or polymyalgia rheumatica, particularly in relation to bilateral stiff shoulder joints/hip girdle, muscle/joint pain, and ERIN 1:80 (could be falsely low given steroids). H. pylori serum antibody is very elevated despite steroid; please have GI evaluate the patient to establish care prior to discharge. Evaluate for tick-borne illnesses (Anaplasma, Erlichia, Lyme).   - Home Medications reviewed: Naproxen can cause drug-induced ITP. However, she has not taken this medication since last discharge.   - US Abdomen did not show hepatosplenomegaly.   - Monitor CBC with differential once daily.   - Avoid platelet transfusion unless active bleed develops.    - Upon improvement and stabilization of platelets > 50K, can discharge to see Dr. Fulton at [facility] in 1-2 weeks. Patient already has a follow-up appointment made.      Mally Mercado MD  [phone]

## 2021-07-08 NOTE — PROGRESS NOTE ADULT - SUBJECTIVE AND OBJECTIVE BOX
LENGTH OF HOSPITAL STAY: 3d    SUBJECTIVE: No acute overnight events    HISTORY OF PRESENTING ILLNESS:   61F PMH ITP (Dx in June 2021 rx'ed w/ Steroids/IVIG), Hypothyroidism, HLD, Depression, arthritis, and macular degeneration presents from outpt Hematologist office w/ Plt count drop from 15k on 7/4 to 1k on 7/5. Of note pt was seen at [facility] ED on 7/4 after was found to have Plt<15k on outpt labs was dc'ed home as per recommendation of hematologist,  on Decadron x 3 days and outpt Hem f/up. Pt notes multiple large  bruising to b/l UE but has not increased over the past few days. Pt also suffers from long standing fatigue and diffuse joint stiffness, worse in back, knees and shoulder, that occurs in the morning and eventually improves after a couple of hours. On ROS denies fever, night sweats, gum bleeding, rash, joint swelling, chest pain, palpitation, SOB, abdominal pain, any recent illness, travel hx  and dysuria. Pt seen by H/O in the ED recommended IVIG and Steroids. Admitted to medicine for management of ITP.   Pt was admitted at [facility] in June and had extensive autoimmune/infx/Hem w/up that was grossly unremarkable and was instructed to f/u w/ Rheum and Hem as outpt. F/up w/ Rheum at [facility]    PAST MEDICAL & SURGICAL HISTORY:  Hypothyroidism  HLD (hyperlipidemia)  History of ITP  Arthritis  No significant past surgical history    ALLERGIES:  azithromycin (Rash)  penicillin (Rash)  vancomycin (Rash)    MEDICATIONS:  STANDING MEDICATIONS  colchicine 0.6 milliGRAM(s) Oral daily  dextrose 40% Gel 15 Gram(s) Oral once  dextrose 5%. 1000 milliLiter(s) IV Continuous <Continuous>  dextrose 5%. 1000 milliLiter(s) IV Continuous <Continuous>  dextrose 50% Injectable 25 Gram(s) IV Push once  dextrose 50% Injectable 12.5 Gram(s) IV Push once  dextrose 50% Injectable 25 Gram(s) IV Push once  glucagon  Injectable 1 milliGRAM(s) IntraMuscular once  insulin lispro (ADMELOG) corrective regimen sliding scale   SubCutaneous Before meals and at bedtime  levothyroxine 125 MICROGram(s) Oral <User Schedule>  levothyroxine 137 MICROGram(s) Oral <User Schedule>  pantoprazole    Tablet 40 milliGRAM(s) Oral before breakfast  sertraline 50 milliGRAM(s) Oral daily    PRN MEDICATIONS    VITALS:   T(F): 97.9  HR: 70  BP: 147/86  RR: 18  SpO2: 95%    LABS:                        12.7   6.50  )-----------( 67       ( 08 Jul 2021 08:49 )             38.6     07-08    136  |  106  |  21  ----------------------------<  101<H>  4.0   |  22  |  0.84    Ca    8.7      08 Jul 2021 08:49  Phos  3.0     07-08  Mg     2.2     07-08    RADIOLOGY:  < from: US Abdomen Complete (US Abdomen Complete .) (07.06.21 @ 15:20) >  No hepatosplenomegaly.    < end of copied text >    PHYSICAL EXAM:  GEN: No acute distress  HEENT: NCAT  LUNGS: Clear to auscultation bilaterally   HEART: S1/S2 present. RRR.   ABD: Soft, non-tender, non-distended. Bowel sounds present  EXT: No pitting edema  NEURO: AAOX3

## 2021-07-13 LAB
ALBUMIN MFR SERPL ELPH: 43.6 %
ALBUMIN SERPL-MCNC: 3.4 G/DL
ALBUMIN/GLOB SERPL: 0.8 RATIO
ALPHA1 GLOB MFR SERPL ELPH: 3.8 %
ALPHA1 GLOB SERPL ELPH-MCNC: 0.3 G/DL
ALPHA2 GLOB MFR SERPL ELPH: 10.2 %
ALPHA2 GLOB SERPL ELPH-MCNC: 0.8 G/DL
B-GLOBULIN MFR SERPL ELPH: 11.8 %
B-GLOBULIN SERPL ELPH-MCNC: 0.9 G/DL
DEPRECATED KAPPA LC FREE/LAMBDA SER: 0.77 RATIO
GAMMA GLOB FLD ELPH-MCNC: 2.4 G/DL
GAMMA GLOB MFR SERPL ELPH: 30.6 %
IGA SER QL IEP: 281 MG/DL
IGG SER QL IEP: 2391 MG/DL
IGM SER QL IEP: 156 MG/DL
INTERPRETATION SERPL IEP-IMP: NORMAL
KAPPA LC CSF-MCNC: 2.09 MG/DL
KAPPA LC SERPL-MCNC: 1.6 MG/DL
M PROTEIN SPEC IFE-MCNC: NORMAL
PROT SERPL-MCNC: 7.8 G/DL
PROT SERPL-MCNC: 7.8 G/DL

## 2021-07-14 DIAGNOSIS — Z88.0 ALLERGY STATUS TO PENICILLIN: ICD-10-CM

## 2021-07-14 DIAGNOSIS — H35.30 UNSPECIFIED MACULAR DEGENERATION: ICD-10-CM

## 2021-07-14 DIAGNOSIS — D69.3 IMMUNE THROMBOCYTOPENIC PURPURA: ICD-10-CM

## 2021-07-14 DIAGNOSIS — F32.9 MAJOR DEPRESSIVE DISORDER, SINGLE EPISODE, UNSPECIFIED: ICD-10-CM

## 2021-07-14 DIAGNOSIS — E03.9 HYPOTHYROIDISM, UNSPECIFIED: ICD-10-CM

## 2021-07-14 DIAGNOSIS — Z88.1 ALLERGY STATUS TO OTHER ANTIBIOTIC AGENTS STATUS: ICD-10-CM

## 2021-07-14 DIAGNOSIS — E78.5 HYPERLIPIDEMIA, UNSPECIFIED: ICD-10-CM

## 2021-07-14 DIAGNOSIS — M19.90 UNSPECIFIED OSTEOARTHRITIS, UNSPECIFIED SITE: ICD-10-CM

## 2021-07-15 ENCOUNTER — INPATIENT (INPATIENT)
Facility: HOSPITAL | Age: 61
LOS: 5 days | Discharge: HOME | DRG: 813 | End: 2021-07-21
Admitting: STUDENT IN AN ORGANIZED HEALTH CARE EDUCATION/TRAINING PROGRAM
Payer: COMMERCIAL

## 2021-07-15 VITALS
WEIGHT: 184.97 LBS | DIASTOLIC BLOOD PRESSURE: 88 MMHG | HEIGHT: 63 IN | SYSTOLIC BLOOD PRESSURE: 127 MMHG | HEART RATE: 89 BPM | RESPIRATION RATE: 16 BRPM | OXYGEN SATURATION: 98 % | TEMPERATURE: 98 F

## 2021-07-15 DIAGNOSIS — M19.90 UNSPECIFIED OSTEOARTHRITIS, UNSPECIFIED SITE: ICD-10-CM

## 2021-07-15 DIAGNOSIS — D69.6 THROMBOCYTOPENIA, UNSPECIFIED: ICD-10-CM

## 2021-07-15 DIAGNOSIS — Z29.9 ENCOUNTER FOR PROPHYLACTIC MEASURES, UNSPECIFIED: ICD-10-CM

## 2021-07-15 DIAGNOSIS — F32.9 MAJOR DEPRESSIVE DISORDER, SINGLE EPISODE, UNSPECIFIED: ICD-10-CM

## 2021-07-15 DIAGNOSIS — Z86.2 PERSONAL HISTORY OF DISEASES OF THE BLOOD AND BLOOD-FORMING ORGANS AND CERTAIN DISORDERS INVOLVING THE IMMUNE MECHANISM: ICD-10-CM

## 2021-07-15 DIAGNOSIS — E78.5 HYPERLIPIDEMIA, UNSPECIFIED: ICD-10-CM

## 2021-07-15 DIAGNOSIS — E03.9 HYPOTHYROIDISM, UNSPECIFIED: ICD-10-CM

## 2021-07-15 LAB
ALBUMIN SERPL ELPH-MCNC: 3.2 G/DL — LOW (ref 3.3–5)
ALBUMIN SERPL ELPH-MCNC: 3.6 G/DL — SIGNIFICANT CHANGE UP (ref 3.3–5)
ALP SERPL-CCNC: 63 U/L — SIGNIFICANT CHANGE UP (ref 40–120)
ALP SERPL-CCNC: SIGNIFICANT CHANGE UP (ref 40–120)
ALT FLD-CCNC: 26 U/L — SIGNIFICANT CHANGE UP (ref 10–45)
ALT FLD-CCNC: SIGNIFICANT CHANGE UP (ref 10–45)
ANION GAP SERPL CALC-SCNC: 10 MMOL/L — SIGNIFICANT CHANGE UP (ref 5–17)
ANION GAP SERPL CALC-SCNC: 11 MMOL/L — SIGNIFICANT CHANGE UP (ref 5–17)
APTT BLD: 27.5 SEC — SIGNIFICANT CHANGE UP (ref 27.5–35.5)
AST SERPL-CCNC: 39 U/L — SIGNIFICANT CHANGE UP (ref 10–40)
AST SERPL-CCNC: SIGNIFICANT CHANGE UP (ref 10–40)
BASOPHILS # BLD AUTO: 0.04 K/UL — SIGNIFICANT CHANGE UP (ref 0–0.2)
BASOPHILS NFR BLD AUTO: 0.9 % — SIGNIFICANT CHANGE UP (ref 0–2)
BILIRUB SERPL-MCNC: 0.4 MG/DL — SIGNIFICANT CHANGE UP (ref 0.2–1.2)
BILIRUB SERPL-MCNC: 0.6 MG/DL — SIGNIFICANT CHANGE UP (ref 0.2–1.2)
BLD GP AB SCN SERPL QL: NEGATIVE — SIGNIFICANT CHANGE UP
BUN SERPL-MCNC: 15 MG/DL — SIGNIFICANT CHANGE UP (ref 7–23)
BUN SERPL-MCNC: 16 MG/DL — SIGNIFICANT CHANGE UP (ref 7–23)
CALCIUM SERPL-MCNC: 8.4 MG/DL — SIGNIFICANT CHANGE UP (ref 8.4–10.5)
CALCIUM SERPL-MCNC: 9 MG/DL — SIGNIFICANT CHANGE UP (ref 8.4–10.5)
CHLORIDE SERPL-SCNC: 103 MMOL/L — SIGNIFICANT CHANGE UP (ref 96–108)
CHLORIDE SERPL-SCNC: 105 MMOL/L — SIGNIFICANT CHANGE UP (ref 96–108)
CO2 SERPL-SCNC: 23 MMOL/L — SIGNIFICANT CHANGE UP (ref 22–31)
CO2 SERPL-SCNC: 23 MMOL/L — SIGNIFICANT CHANGE UP (ref 22–31)
CREAT SERPL-MCNC: 0.99 MG/DL — SIGNIFICANT CHANGE UP (ref 0.5–1.3)
CREAT SERPL-MCNC: 1.18 MG/DL — SIGNIFICANT CHANGE UP (ref 0.5–1.3)
DACRYOCYTES BLD QL SMEAR: SLIGHT — SIGNIFICANT CHANGE UP
EOSINOPHIL # BLD AUTO: 0.08 K/UL — SIGNIFICANT CHANGE UP (ref 0–0.5)
EOSINOPHIL NFR BLD AUTO: 1.8 % — SIGNIFICANT CHANGE UP (ref 0–6)
GIANT PLATELETS BLD QL SMEAR: PRESENT — SIGNIFICANT CHANGE UP
GLUCOSE SERPL-MCNC: 105 MG/DL — HIGH (ref 70–99)
GLUCOSE SERPL-MCNC: 92 MG/DL — SIGNIFICANT CHANGE UP (ref 70–99)
HCT VFR BLD CALC: 39.6 % — SIGNIFICANT CHANGE UP (ref 34.5–45)
HGB BLD-MCNC: 13.4 G/DL — SIGNIFICANT CHANGE UP (ref 11.5–15.5)
INR BLD: 1.01 — SIGNIFICANT CHANGE UP (ref 0.88–1.16)
LYMPHOCYTES # BLD AUTO: 1.49 K/UL — SIGNIFICANT CHANGE UP (ref 1–3.3)
LYMPHOCYTES # BLD AUTO: 34.8 % — SIGNIFICANT CHANGE UP (ref 13–44)
MANUAL SMEAR VERIFICATION: SIGNIFICANT CHANGE UP
MCHC RBC-ENTMCNC: 31.8 PG — SIGNIFICANT CHANGE UP (ref 27–34)
MCHC RBC-ENTMCNC: 33.8 GM/DL — SIGNIFICANT CHANGE UP (ref 32–36)
MCV RBC AUTO: 94.1 FL — SIGNIFICANT CHANGE UP (ref 80–100)
MONOCYTES # BLD AUTO: 0.3 K/UL — SIGNIFICANT CHANGE UP (ref 0–0.9)
MONOCYTES NFR BLD AUTO: 7.1 % — SIGNIFICANT CHANGE UP (ref 2–14)
NEUTROPHILS # BLD AUTO: 2.29 K/UL — SIGNIFICANT CHANGE UP (ref 1.8–7.4)
NEUTROPHILS NFR BLD AUTO: 51.8 % — SIGNIFICANT CHANGE UP (ref 43–77)
NEUTS BAND # BLD: 1.8 % — SIGNIFICANT CHANGE UP (ref 0–8)
PLAT MORPH BLD: ABNORMAL
PLATELET # BLD AUTO: 1 K/UL — CRITICAL LOW (ref 150–400)
POIKILOCYTOSIS BLD QL AUTO: SLIGHT — SIGNIFICANT CHANGE UP
POTASSIUM SERPL-MCNC: 4 MMOL/L — SIGNIFICANT CHANGE UP (ref 3.5–5.3)
POTASSIUM SERPL-MCNC: SIGNIFICANT CHANGE UP (ref 3.5–5.3)
POTASSIUM SERPL-SCNC: 4 MMOL/L — SIGNIFICANT CHANGE UP (ref 3.5–5.3)
POTASSIUM SERPL-SCNC: SIGNIFICANT CHANGE UP (ref 3.5–5.3)
PROT SERPL-MCNC: 7 G/DL — SIGNIFICANT CHANGE UP (ref 6–8.3)
PROT SERPL-MCNC: 8.3 G/DL — SIGNIFICANT CHANGE UP (ref 6–8.3)
PROTHROM AB SERPL-ACNC: 12.1 SEC — SIGNIFICANT CHANGE UP (ref 10.6–13.6)
RBC # BLD: 4.21 M/UL — SIGNIFICANT CHANGE UP (ref 3.8–5.2)
RBC # FLD: 11.9 % — SIGNIFICANT CHANGE UP (ref 10.3–14.5)
RBC BLD AUTO: ABNORMAL
RH IG SCN BLD-IMP: POSITIVE — SIGNIFICANT CHANGE UP
SARS-COV-2 RNA SPEC QL NAA+PROBE: NEGATIVE — SIGNIFICANT CHANGE UP
SMUDGE CELLS # BLD: PRESENT — SIGNIFICANT CHANGE UP
SODIUM SERPL-SCNC: 137 MMOL/L — SIGNIFICANT CHANGE UP (ref 135–145)
SODIUM SERPL-SCNC: 138 MMOL/L — SIGNIFICANT CHANGE UP (ref 135–145)
VARIANT LYMPHS # BLD: 1.8 % — SIGNIFICANT CHANGE UP (ref 0–6)
WBC # BLD: 4.27 K/UL — SIGNIFICANT CHANGE UP (ref 3.8–10.5)
WBC # FLD AUTO: 4.27 K/UL — SIGNIFICANT CHANGE UP (ref 3.8–10.5)

## 2021-07-15 PROCEDURE — 99223 1ST HOSP IP/OBS HIGH 75: CPT | Mod: GC

## 2021-07-15 PROCEDURE — 99285 EMERGENCY DEPT VISIT HI MDM: CPT

## 2021-07-15 RX ORDER — LEVOTHYROXINE SODIUM 125 MCG
137 TABLET ORAL
Refills: 0 | Status: DISCONTINUED | OUTPATIENT
Start: 2021-07-15 | End: 2021-07-21

## 2021-07-15 RX ORDER — ACETAMINOPHEN 500 MG
650 TABLET ORAL ONCE
Refills: 0 | Status: COMPLETED | OUTPATIENT
Start: 2021-07-15 | End: 2021-07-15

## 2021-07-15 RX ORDER — DIPHENHYDRAMINE HCL 50 MG
50 CAPSULE ORAL ONCE
Refills: 0 | Status: DISCONTINUED | OUTPATIENT
Start: 2021-07-15 | End: 2021-07-15

## 2021-07-15 RX ORDER — SIMVASTATIN 20 MG/1
10 TABLET, FILM COATED ORAL AT BEDTIME
Refills: 0 | Status: DISCONTINUED | OUTPATIENT
Start: 2021-07-15 | End: 2021-07-16

## 2021-07-15 RX ORDER — SERTRALINE 25 MG/1
50 TABLET, FILM COATED ORAL DAILY
Refills: 0 | Status: DISCONTINUED | OUTPATIENT
Start: 2021-07-15 | End: 2021-07-21

## 2021-07-15 RX ORDER — LEVOTHYROXINE SODIUM 125 MCG
125 TABLET ORAL
Refills: 0 | Status: DISCONTINUED | OUTPATIENT
Start: 2021-07-15 | End: 2021-07-21

## 2021-07-15 RX ORDER — IMMUNE GLOBULIN (HUMAN) 10 G/100ML
65 INJECTION INTRAVENOUS; SUBCUTANEOUS ONCE
Refills: 0 | Status: COMPLETED | OUTPATIENT
Start: 2021-07-15 | End: 2021-07-15

## 2021-07-15 RX ORDER — DIPHENHYDRAMINE HCL 50 MG
50 CAPSULE ORAL ONCE
Refills: 0 | Status: COMPLETED | OUTPATIENT
Start: 2021-07-15 | End: 2021-07-15

## 2021-07-15 RX ADMIN — Medication 50 MILLIGRAM(S): at 21:47

## 2021-07-15 RX ADMIN — Medication 650 MILLIGRAM(S): at 21:47

## 2021-07-15 RX ADMIN — IMMUNE GLOBULIN (HUMAN) 162.5 GRAM(S): 10 INJECTION INTRAVENOUS; SUBCUTANEOUS at 21:48

## 2021-07-15 RX ADMIN — Medication 650 MILLIGRAM(S): at 22:17

## 2021-07-15 NOTE — H&P ADULT - PROBLEM SELECTOR PLAN 6
Known HLD. Takes Ezetimibe 10mg qD at home.   -C/w home meds. history of HLD  - continue Ezetimibe 10mg daily (simvastatin 10mg daily)

## 2021-07-15 NOTE — H&P ADULT - PROBLEM SELECTOR PLAN 4
Takes levothyroxine 137mcg 5 times a week and 125mcg 2 times a week at home.   -C/w home meds. history of hypothyroidism  - continue home levothyroxine 137mcg (Monday-Friday) and 125mcg daily on weekends. history of hypothyroidism  - continue home levothyroxine 137mcg (Monday-Friday) and 125mcg daily on weekends.  - TSH normal on 7/6/2021

## 2021-07-15 NOTE — H&P ADULT - NSHPPHYSICALEXAM_GEN_ALL_CORE
Vital Signs Last 12 Hrs  T(F): 98.4 (07-15-21 @ 16:32), Max: 98.4 (07-15-21 @ 16:32)  HR: 96 (07-15-21 @ 16:32) (61 - 96)  BP: 123/81 (07-15-21 @ 16:32) (123/81 - 154/75)  BP(mean): --  RR: 18 (07-15-21 @ 16:32) (16 - 18)  SpO2: 96% (07-15-21 @ 16:32) (96% - 98%)    PHYSICAL EXAM:  Constitutional: NAD, comfortable in bed.  HEENT: NC/AT, PERRLA, EOMI, no conjunctival pallor or scleral icterus, MMM  Neck: Supple, no JVD  Respiratory: CTA B/L. No w/r/r.   Cardiovascular: RRR, normal S1 and S2, no m/r/g.   Gastrointestinal: +BS, soft NTND, no guarding or rebound tenderness, no palpable masses   Extremities: wwp; no cyanosis, clubbing or edema.   Vascular: Pulses equal and strong throughout.   Neurological: AAOx3, no CN deficits, strength and sensation intact throughout.   Skin: No gross skin abnormalities or rashes Vital Signs Last 12 Hrs  T(F): 98.4 (07-15-21 @ 16:32), Max: 98.4 (07-15-21 @ 16:32)  HR: 96 (07-15-21 @ 16:32) (61 - 96)  BP: 123/81 (07-15-21 @ 16:32) (123/81 - 154/75)  BP(mean): --  RR: 18 (07-15-21 @ 16:32) (16 - 18)  SpO2: 96% (07-15-21 @ 16:32) (96% - 98%)    PHYSICAL EXAM:  Constitutional: NAD, comfortable in bed.  HEENT: NC/AT, PERRLA, EOMI, no conjunctival pallor or scleral icterus, MMM  Neck: Supple, no JVD  Respiratory: CTA B/L. No w/r/r.   Cardiovascular: RRR, normal S1 and S2, no m/r/g.   Gastrointestinal: +BS, soft NTND, no guarding or rebound tenderness, no palpable masses   Extremities: multiple ecchymosis on her arms and legs, wwp; no cyanosis, clubbing or edema.   Vascular: Pulses equal and strong throughout.   Neurological: AAOx3, no CN deficits, strength and sensation intact throughout.   Skin: multiple ecchymosis on her arms and legs, no other gross skin abnormalities or rashes

## 2021-07-15 NOTE — H&P ADULT - PROBLEM SELECTOR PLAN 5
Known depression. Takes sertraline 50mg qD at home.   -C/w home meds. history of depression   - continue home sertraline 50mg daily

## 2021-07-15 NOTE — CONSULT NOTE ADULT - SUBJECTIVE AND OBJECTIVE BOX
Hematology Oncology Consult Note (Dr. Mercado)  Discussed with Dr. Mercado and recommendations reviewed with the primary team.    HPI: 60 yo F with PMH of ITP (diagnosed in June 2021, previously treated with IVIG/Steroids), Hypothyroidism, HLD, Depression, arthritis, and macular degeneration presents from outpatient  Hematologist office (Dr. Fulton) with a platelet count of 2K, associated with upper extremity ecchymoses, petechiae in b/l upper and lower extremities. Denies any epistaxis, gum or mouth bleeding, hematuria, melena, or hematochezia. Patient was recently admitted to [facility] for ITP with platelet count of 4K, treated with IVIG and steroids, on discharge (7/8) platelet count was 67K. She was discharged on prednisone 40mg QD. Hematology consulted for h/o ITP refractory to steroids.       PAST MEDICAL & SURGICAL HISTORY:  Hypothyroidism  HLD (hyperlipidemia)  History of ITP  Arthritis  No significant past surgical history    Allergies:  azithromycin (Rash)  penicillin (Rash)  vancomycin (Rash)    Medications:  Home Medications:  colchicine 0.6 mg oral tablet: 1 tab(s) orally once a day (08 Jul 2021 14:51)  ezetimibe 10 mg oral tablet: 1 tab(s) orally once a day (06 Jul 2021 02:49)  levothyroxine 125 mcg (0.125 mg) oral tablet: 1 tab(s) orally Saturday and Sunday (06 Jul 2021 02:49)  levothyroxine 137 mcg (0.137 mg) oral capsule: 1 cap(s) orally 5 times a week    Monday -Friday (06 Jul 2021 02:49)  sertraline 50 mg oral tablet: 1 tab(s) orally once a day (06 Jul 2021 02:49)    Social History:  Works at a law firm in administration  Does not smoke. Denies drug use. Takes Magnesium, Zinc, Calcium/Vitamin D3, Biotin and Lyrine   Mother: Had brain aneurysm in 1996  Father: Had small bladder tumor that was removed   Brother: Grave's ophthalmopathy     FAMILY HISTORY: Denies family history of bleeding or clotting disorders.     PHYSICAL EXAM:    Vital Signs Last 24 Hrs  T(C): 36.3 (15 Jul 2021 16:24), Max: 36.6 (15 Jul 2021 14:34)  T(F): 97.4 (15 Jul 2021 16:24), Max: 97.9 (15 Jul 2021 14:34)  HR: 61 (15 Jul 2021 16:24) (61 - 89)  BP: 154/75 (15 Jul 2021 16:24) (127/88 - 154/75)  RR: 18 (15 Jul 2021 16:24) (16 - 18)  SpO2: 96% (15 Jul 2021 16:24) (96% - 98%)    Gen: comfortable in NAD  HEENT: normocephalic/atraumatic, no conjunctival pallor, no scleral icterus, no oral thrush/mucosal bleeding/mucositis, no palatal petechia   Neck: supple  Cardiovascular: RR, nl S1S2, no murmurs  Respiratory: clear air entry b/l  Gastrointestinal: BS+, soft, NT/ND  Extremities: no edema, no calf tenderness  Neurological: AAOx3, no focal deficits  Skin: petechial rash in b/l upper and lower extremities   Musculoskeletal:  full ROM  Psychiatric:  mood stable      Labs:                          13.4   4.27  )-----------( 1        ( 15 Jul 2021 15:13 )             39.6     CBC Full  -  ( 15 Jul 2021 15:13 )  WBC Count : 4.27 K/uL  RBC Count : 4.21 M/uL  Hemoglobin : 13.4 g/dL  Hematocrit : 39.6 %  Platelet Count - Automated : 1 K/uL  Mean Cell Volume : 94.1 fl  Mean Cell Hemoglobin : 31.8 pg  Mean Cell Hemoglobin Concentration : 33.8 gm/dL  Auto Neutrophil # : 2.29 K/uL  Auto Lymphocyte # : 1.49 K/uL  Auto Monocyte # : 0.30 K/uL  Auto Eosinophil # : 0.08 K/uL  Auto Basophil # : 0.04 K/uL  Auto Neutrophil % : 51.8 %  Auto Lymphocyte % : 34.8 %  Auto Monocyte % : 7.1 %  Auto Eosinophil % : 1.8 %  Auto Basophil % : 0.9 %    PT/INR - ( 15 Jul 2021 15:13 )   PT: 12.1 sec;   INR: 1.01       PTT - ( 15 Jul 2021 15:13 )  PTT:27.5 sec    07-15    137  |  103  |  15  ----------------------------<  92  See Note   |  23  |  0.99    Ca    9.0      15 Jul 2021 15:13    TPro  8.3  /  Alb  3.6  /  TBili  0.6  /  DBili  x   /  AST  See Note  /  ALT  See Note  /  AlkPhos  See Note  07-15    Imaging Studies:       Hematology Oncology Consult Note (Dr. Mercado)  Discussed with Dr. Mercado and recommendations reviewed with the primary team.    HPI: 62 yo F with PMH of ITP (diagnosed in June 2021, previously treated with IVIG/Steroids), Hypothyroidism, HLD, Depression, arthritis, and macular degeneration presents from outpatient  Hematologist office (Dr. Fulton) with a platelet count of 2K, associated with upper extremity ecchymoses, petechiae in b/l upper and lower extremities. Denies any epistaxis, gum or mouth bleeding, hematuria, melena, or hematochezia. Patient was recently admitted to [facility] for ITP with platelet count of 4K, treated with IVIG and steroids, on discharge (7/8) platelet count was 67K. Hematology consulted for h/o ITP refractory to steroids.       PAST MEDICAL & SURGICAL HISTORY:  Hypothyroidism  HLD (hyperlipidemia)  History of ITP  Arthritis  No significant past surgical history    Allergies:  azithromycin (Rash)  penicillin (Rash)  vancomycin (Rash)    Medications:  Home Medications:  colchicine 0.6 mg oral tablet: 1 tab(s) orally once a day (08 Jul 2021 14:51)  ezetimibe 10 mg oral tablet: 1 tab(s) orally once a day (06 Jul 2021 02:49)  levothyroxine 125 mcg (0.125 mg) oral tablet: 1 tab(s) orally Saturday and Sunday (06 Jul 2021 02:49)  levothyroxine 137 mcg (0.137 mg) oral capsule: 1 cap(s) orally 5 times a week    Monday -Friday (06 Jul 2021 02:49)  sertraline 50 mg oral tablet: 1 tab(s) orally once a day (06 Jul 2021 02:49)    Social History:  Works at a law firm in administration  Does not smoke. Denies drug use. Takes Magnesium, Zinc, Calcium/Vitamin D3, Biotin and Lyrine   Mother: Had brain aneurysm in 1996  Father: Had small bladder tumor that was removed   Brother: Grave's ophthalmopathy     FAMILY HISTORY: Denies family history of bleeding or clotting disorders.     PHYSICAL EXAM:    Vital Signs Last 24 Hrs  T(C): 36.3 (15 Jul 2021 16:24), Max: 36.6 (15 Jul 2021 14:34)  T(F): 97.4 (15 Jul 2021 16:24), Max: 97.9 (15 Jul 2021 14:34)  HR: 61 (15 Jul 2021 16:24) (61 - 89)  BP: 154/75 (15 Jul 2021 16:24) (127/88 - 154/75)  RR: 18 (15 Jul 2021 16:24) (16 - 18)  SpO2: 96% (15 Jul 2021 16:24) (96% - 98%)    Gen: comfortable in NAD  HEENT: normocephalic/atraumatic, no conjunctival pallor, no scleral icterus, no oral thrush/mucosal bleeding/mucositis, no palatal petechia   Neck: supple  Cardiovascular: RR, nl S1S2, no murmurs  Respiratory: clear air entry b/l  Gastrointestinal: BS+, soft, NT/ND  Extremities: no edema, no calf tenderness  Neurological: AAOx3, no focal deficits  Skin: petechial rash in b/l upper and lower extremities   Musculoskeletal:  full ROM  Psychiatric:  mood stable      Labs:                          13.4   4.27  )-----------( 1        ( 15 Jul 2021 15:13 )             39.6     CBC Full  -  ( 15 Jul 2021 15:13 )  WBC Count : 4.27 K/uL  RBC Count : 4.21 M/uL  Hemoglobin : 13.4 g/dL  Hematocrit : 39.6 %  Platelet Count - Automated : 1 K/uL  Mean Cell Volume : 94.1 fl  Mean Cell Hemoglobin : 31.8 pg  Mean Cell Hemoglobin Concentration : 33.8 gm/dL  Auto Neutrophil # : 2.29 K/uL  Auto Lymphocyte # : 1.49 K/uL  Auto Monocyte # : 0.30 K/uL  Auto Eosinophil # : 0.08 K/uL  Auto Basophil # : 0.04 K/uL  Auto Neutrophil % : 51.8 %  Auto Lymphocyte % : 34.8 %  Auto Monocyte % : 7.1 %  Auto Eosinophil % : 1.8 %  Auto Basophil % : 0.9 %    PT/INR - ( 15 Jul 2021 15:13 )   PT: 12.1 sec;   INR: 1.01       PTT - ( 15 Jul 2021 15:13 )  PTT:27.5 sec    07-15    137  |  103  |  15  ----------------------------<  92  See Note   |  23  |  0.99    Ca    9.0      15 Jul 2021 15:13    TPro  8.3  /  Alb  3.6  /  TBili  0.6  /  DBili  x   /  AST  See Note  /  ALT  See Note  /  AlkPhos  See Note  07-15    Imaging Studies:

## 2021-07-15 NOTE — ED PROVIDER NOTE - CLINICAL SUMMARY MEDICAL DECISION MAKING FREE TEXT BOX
Impression: recently dx'd w/ ITP, tx'd w/ IVIG, high dose steroids, here now w/ thrombocytopenia again with plt ct of 1. HDS. No active bleeding at this time. Case d/w Dr. Fulton; [place] [facility] [place]. No indications for further steroids at this time as pt failed tx w/ dex on last admission. Heme/onc fellow at bedside. Remainder of labs wnl. K hemolyzed, however given profound thrombocytopenia, will hold on repeat draw as low suspicion for abnormal k value.

## 2021-07-15 NOTE — H&P ADULT - ATTENDING COMMENTS
61F PMH hypothyroidism, HLD, depression, arthritis, ITP (new diagnosis, initially admitted 6/23, seen in ED 7/4) sent to ED by Dr. Pak (heme/onc) for platelet count of 1 admitted for further management of thrombocytopenia 2/2 to ITP    #Thrombocytopenia: 2/2 ITP and refractory to steroids; start IVIG for 1-2 days; heme following; will consider rituxmab or splenectomy; no steroids or transfusion for now unless active bleeding. continue to monitor

## 2021-07-15 NOTE — H&P ADULT - NSHPLABSRESULTS_GEN_ALL_CORE
LABS:                         13.4   4.27  )-----------( 1        ( 15 Jul 2021 15:13 )             39.6     07-15    137  |  103  |  15  ----------------------------<  92  See Note   |  23  |  0.99    Ca    9.0      15 Jul 2021 15:13    TPro  8.3  /  Alb  3.6  /  TBili  0.6  /  DBili  x   /  AST  See Note  /  ALT  See Note  /  AlkPhos  See Note  07-15    PT/INR - ( 15 Jul 2021 15:13 )   PT: 12.1 sec;   INR: 1.01          PTT - ( 15 Jul 2021 15:13 )  PTT:27.5 sec              RADIOLOGY, EKG & ADDITIONAL TESTS:

## 2021-07-15 NOTE — H&P ADULT - HISTORY OF PRESENT ILLNESS
60 yo F with PMH of ITP (diagnosed in June 2021, previously treated with IVIG/Steroids), Hypothyroidism, HLD, Depression, arthritis, and macular degeneration presents from outpatient  Hematologist office (Dr. Fulton) with a platelet count of 2K, associated with upper extremity ecchymoses, petechiae in b/l upper and lower extremities. Denies any epistaxis, gum or mouth bleeding, hematuria, melena, or hematochezia. Patient was recently admitted to [facility] for ITP with platelet count of 4K, treated with IVIG and steroids, on discharge (7/8) platelet count was 67K. She was discharged on prednisone 40mg QD. Hematology consulted for h/o ITP refractory to steroids.     ED Course:  Vitals: Tmax 97.9F oral, HR 89, /88, RR 16, spO2 98% room air  Labs: wbc 4.27, hb 13.4, mcv 94.1, plts 1, CMP unremrakble, K, and LFTs hemoyzed  EKG:   Imaging: none  Interventions: none   61F PMH hypothyroidism, HLD, depression, arthritis, ITP (new diagnosis, initially admitted 6/23, seen in ED 7/4) sent to ED by Dr. Pak (heme/onc) for platelet count of 2 on outpatient labs. Patient is presenting with upper extremity ecchymoses, petechiae in b/l upper and lower extremities. She denies any epistaxis, gum or mouth bleeding, hematuria, melena, or hematochezia.  She was recently diagnosed with ITP in June 2021 with platelet count of 2 and got 4 days of Decadron 40mg daily and 2 rounds of IVIG as an inpatient and was discharged with a follow up with Dr. Pak. Last week she presented to the ED for platelets <1 and got 4 days of IV Decadron 40mg and 2 days of IVIG 65mg. Platelet count progressively increased to 67 and she was discharged on prednisone 40mg QD. She denies any fevers, chills, headaches, chest pain, shortness of breath, cough, abdominal pain, n/v/d/c, edema.     ED Course:  Vitals: Tmax 97.9F oral, HR 89, /88, RR 16, spO2 98% room air  Labs: wbc 4.27, hb 13.4, mcv 94.1, plts 1, CMP unremarkable K, and LFTs hemolyzed  EKG:   Imaging: none  Interventions: none   61F PMH hypothyroidism, HLD, depression, arthritis, ITP (new diagnosis, initially admitted 6/23, seen in ED 7/4) sent to ED by Dr. Pak (heme/onc) for platelet count of 2 on outpatient labs. Patient is presenting with upper extremity ecchymoses, petechiae in b/l upper and lower extremities. She denies any epistaxis, gum or mouth bleeding, hematuria, melena, or hematochezia.  She was recently diagnosed with ITP in June 2021 with platelet count of 2 and got 4 days of Decadron 40mg daily and 2 rounds of IVIG as an inpatient and was discharged with a follow up with Dr. Pak. Last week she presented to the ED for platelets <1 and got 4 days of IV Decadron 40mg and 2 days of IVIG 65mg. Platelet count progressively increased to 67 and she was discharged without steroids. She denies any fevers, chills, headaches, chest pain, shortness of breath, cough, abdominal pain, n/v/d/c, edema.     ED Course:  Vitals: Tmax 97.9F oral, HR 89, /88, RR 16, spO2 98% room air  Labs: wbc 4.27, hb 13.4, mcv 94.1, plts 1, CMP unremarkable K, and LFTs hemolyzed  EKG:   Imaging: none  Interventions: none

## 2021-07-15 NOTE — H&P ADULT - PROBLEM SELECTOR PLAN 1
Presented with outpatient platelet count of 2K, repeat in ED 1K. Recent admission last week with platelets <1 and got 4 days of IV Decadron 40mg and 2 days of IVIG 65mg with improvement to plts 67K   - has petechiae and ecchymosis in b/l extremities   - Had prior workup for thrombocytopenia as follows: HIV and HCV negative. RF WNL, B12/Folate WNL. Peripheral flow cytometry (06/23) did not show any circulating blasts or abnormal cell population. US Abdomen did not show hepatosplenomegaly.   - H. Pylori Abs positive, has not seen GI as outpatient yet.   - ERIN titer 1:80, was supposed to see Rheum tomorrow for further eval.   - Start IVIG 1g/kg x1 for now  - Monitor CBC daily   - Monitor for signs and symptoms of bleeding   - Avoid platelet transfusion unless she is actively bleeding   - Will consider Rituxmab or Splenectomy if no improvement in thrombocytopenia  - heme/onc following, appreciate recs Presented with outpatient platelet count of 2K, repeat in ED 1K. Recent admission last week with platelets <1 and got 4 days of IV Decadron 40mg and 2 days of IVIG 65mg with improvement to plts 67K   - has petechiae and ecchymosis in b/l extremities   - Had prior workup for thrombocytopenia as follows: HIV and HCV negative. RF WNL, B12/Folate WNL. Peripheral flow cytometry (06/23) did not show any circulating blasts or abnormal cell population. US Abdomen did not show hepatosplenomegaly.   - H. Pylori Abs positive, has not seen GI as outpatient yet.   - ERIN titer 1:80, seeing rheum outpatient  - Start IVIG 1g/kg x1 for now  - Monitor CBC daily   - Monitor for signs and symptoms of bleeding   - Avoid platelet transfusion unless she is actively bleeding   - Will consider Rituxmab or Splenectomy if no improvement in thrombocytopenia  - heme/onc following, appreciate recs

## 2021-07-15 NOTE — H&P ADULT - PROBLEM SELECTOR PLAN 7
F: none   E: Replete K<4, Mg<2  N: DASH/TLC diet  DVT ppx: SCDs given thrombocytopenia  Dispo RMF    FULL CODE

## 2021-07-15 NOTE — ED PROVIDER NOTE - OBJECTIVE STATEMENT
60 y/o F with PMHx of hypothyroidism, HLD, depression, hepatitis, dep, hep, diagnosed with ITP treated with IV IG and platleets, July 5th prsenrecurrtent ashley second to ITP treated with dextramethazone presents with thrombocytopenia again with complaints of generalized fatigue and myalgia. Denies headache, dizziness, bleeding from mouth/nose, hematuria, hematemesis, hemoptysis, or other complaints. Notes she bruises easily. 62 y/o F with PMHx of hypothyroidism, HLD, depression, arthritis, recently diagnosed with ITP and treated with IVIG/ dexamethasone, presents with recurrent thrombocytopenia with complaints of generalized fatigue and malaise. Denies headache, dizziness, bleeding from mouth/nose, hematuria, hematemesis, hemoptysis, or other complaints. Notes she bruises easily. No cp, sob.

## 2021-07-15 NOTE — ED ADULT NURSE NOTE - OBJECTIVE STATEMENT
Presents from PCP for low platelet count, reportedly 2, PMH ITP. On assessment- Pt is AOx3, breathing even and unlabored on RA, no apparent distress, apparent bruising throughout body notably on R and LUE, immediate hematoma to site of tourniquet application.

## 2021-07-15 NOTE — H&P ADULT - NSHPSOCIALHISTORY_GEN_ALL_CORE
Former social smoker, denies alcohol, and drug use. Lives at home with her children. Ambulates independently. Work in [employer].

## 2021-07-15 NOTE — CONSULT NOTE ADULT - ASSESSMENT
62 yo F, [place], with PMHx of ITP (on treatment with steroids/IVIG) hypothyroidism, hyperlipidemia, anxiety/depression, macular degeneration, bilateral knee osteoarthritis, was sent to ED for low platelets. Patient has had bruising on her arms/legs and petechiae. Hematology consulted for h/o ITP refractory to steroids.     ITP, refractory to steroids   - Presented with platelet count of 2K, repeat in ED 1K. Platelet count prior to discharge on 7/8 was 67K   - has petechiae and ecchymosis in b/l extremities   - previously treated with IV dexamethasone 40mg QD x 4 days and IVIG x 2 doses   - Had prior workup for thrombocytopenia as follows: HIV and HCV negative. RF WNL, B12/Folate WNL. Peripheral flow cytometry (06/23) did not show any circulating blasts or abnormal cell population. US Abdomen did not show hepatosplenomegaly.   -H. Pylori Abs positive, has not seen GI as outpatient yet.   -ERIN titer 1:80, was supposed to see Rheum tomorrow for further eval.   -Start IVIG 400 mg/kg/day for 2-5 days   -Monitor CBC daily   -Monitor for signs and symptoms of bleeding   -Avoid platelet transfusion unless she is actively bleeding   -Will consider Rituxmab or Splenectomy if no improvement in thrombocytopenia.    Incomplete note  To discuss with Dr. Mercado    60 yo F, [place], with PMHx of ITP (on treatment with steroids/IVIG) hypothyroidism, hyperlipidemia, anxiety/depression, macular degeneration, bilateral knee osteoarthritis, was sent to ED for low platelets. Patient has had bruising on her arms/legs and petechiae. Hematology consulted for h/o ITP refractory to steroids.     ITP, refractory to steroids   - Presented with platelet count of 2K, repeat in ED 1K. Platelet count prior to discharge on 7/8 was 67K   - has petechiae and ecchymosis in b/l extremities   - previously treated with IV dexamethasone 40mg QD x 4 days and IVIG x 2 doses   - Had prior workup for thrombocytopenia as follows: HIV and HCV negative. RF WNL, B12/Folate WNL. Peripheral flow cytometry (06/23) did not show any circulating blasts or abnormal cell population. US Abdomen did not show hepatosplenomegaly.   -H. Pylori Abs positive, has not seen GI as outpatient yet.   -ERIN titer 1:80, was supposed to see Rheum tomorrow for further eval.   -Start IVIG 1g/kg for 1-2 days    -Monitor CBC daily   -Monitor for signs and symptoms of bleeding   -Avoid platelet transfusion unless she is actively bleeding   -Will consider Rituxmab or Splenectomy if no improvement in thrombocytopenia.    Incomplete note  To discuss with Dr. Mercado    60 yo F, [place], with PMHx of ITP (on treatment with steroids/IVIG) hypothyroidism, hyperlipidemia, anxiety/depression, macular degeneration, bilateral knee osteoarthritis, was sent to ED for low platelets. Patient has had bruising on her arms/legs and petechiae. Hematology consulted for h/o ITP refractory to steroids.     ITP, refractory to steroids   - Presented with platelet count of 2K, repeat in ED 1K. Platelet count prior to discharge on 7/8 was 67K   - has petechiae and ecchymosis in b/l extremities   - previously treated with IV dexamethasone 40mg QD x 4 days and IVIG x 2 doses   - Had prior workup for thrombocytopenia as follows: HIV and HCV negative. RF WNL, B12/Folate WNL. Peripheral flow cytometry (06/23) did not show any circulating blasts or abnormal cell population. US Abdomen did not show hepatosplenomegaly.   -H. Pylori Abs positive, has not seen GI as outpatient yet.   -ERIN titer 1:80, was supposed to see Rheum tomorrow for further eval.   -Start IVIG 1g/kg daily for 1-2 days    -Monitor CBC daily   -Monitor for signs and symptoms of bleeding   -Avoid platelet transfusion unless she is actively bleeding   -Will consider Rituxmab or Splenectomy if no improvement in thrombocytopenia.    Discussed with Dr. Mercado    60 yo F, [place]ian, with PMHx of ITP (on treatment with steroids/IVIG) hypothyroidism, hyperlipidemia, anxiety/depression, macular degeneration, bilateral knee osteoarthritis, was sent to ED for low platelets. Patient has had bruising on her arms/legs and petechiae. Hematology consulted for h/o ITP refractory to steroids.     ITP, refractory to steroids   - Presented with platelet count of 2K, repeat in ED 1K. Platelet count prior to discharge on 7/8 was 67K   - has petechiae and ecchymosis in b/l extremities   - previously treated with IV dexamethasone 40mg QD x 4 days and IVIG x 2 doses   - Had prior workup for thrombocytopenia as follows: HIV and HCV negative. RF WNL, B12/Folate WNL. Peripheral flow cytometry (06/23) did not show any circulating blasts or abnormal cell population. US Abdomen did not show hepatosplenomegaly.   -H. Pylori Abs positive, has not seen GI as outpatient yet.   -ERIN titer 1:80, was supposed to see Rheum tomorrow for further eval.   -Start IVIG 1g/kg daily for 1-2 days, will get 65mg IVIG today.   -Monitor CBC daily   -Monitor for signs and symptoms of bleeding   -Avoid platelet transfusion unless she is actively bleeding   -Will consider Rituxmab or Splenectomy if no improvement in thrombocytopenia.    Discussed with Dr. Mercado

## 2021-07-15 NOTE — H&P ADULT - ASSESSMENT
61F PMH hypothyroidism, HLD, depression, arthritis, ITP (new diagnosis, initially admitted 6/23, seen in ED 7/4) sent to ED by Dr. Pak (heme/onc) for platelet count of 1 admitted for further management of thrombocytopenia 2/2 to ITP.

## 2021-07-15 NOTE — ED PROVIDER NOTE - PHYSICAL EXAMINATION
VITAL SIGNS: I have reviewed nursing notes and confirm.  CONSTITUTIONAL: Well-developed; in no acute distress.   SKIN:  warm and dry, no acute rash.   HEAD:  normocephalic, atraumatic.  EYES: PERRL, EOM intact; conjunctiva and sclera clear.  ENT: No nasal discharge; airway clear.   NECK: Supple; non tender.  CARD: S1, S2 normal; no murmurs, gallops, or rubs. Regular rate and rhythm.   RESP:  Clear to auscultation b/l, no wheezes, rales or rhonchi.  ABD: Normal bowel sounds; soft; non-distended; non-tender; no guarding/ rebound.  EXT: Ecchymosis on b/l UE and petechiae on b/l LE. Normal ROM.  2+ pulses to b/l ue/le.  NEURO: Alert, oriented, grossly unremarkable  PSYCH: Cooperative, mood and affect appropriate. VITAL SIGNS: I have reviewed nursing notes and confirm.  CONSTITUTIONAL: Well-appearing, in no acute distress.   SKIN:  warm and dry, no acute rash.   HEAD:  normocephalic, atraumatic.  EYES: PERRL, EOM intact; conjunctiva and sclera clear.  ENT: No nasal discharge; airway clear.   NECK: Supple; non tender.  CARD: S1, S2 normal; no murmurs, gallops, or rubs. Regular rate and rhythm.   RESP:  Clear to auscultation b/l, no wheezes, rales or rhonchi.  ABD: Normal bowel sounds; soft; non-distended; non-tender; no guarding/ rebound.  EXT: Ecchymosis on b/l UE and petechiae to b/l LE. Normal ROM.  2+ pulses to b/l ue/le.  NEURO: Alert, oriented, grossly unremarkable  PSYCH: Cooperative, mood and affect appropriate.

## 2021-07-15 NOTE — H&P ADULT - PROBLEM SELECTOR PLAN 3
history of chronic morning joint stiffness of back and knees.   - Previous workup negative for anti-dsDNA, anti-Cobb, anti-RNP, rheumatoid factor. ERIN titer 1:80.   - Continue home colchicine as per outpt rheum.  - collateral from Dr. Daly history of chronic morning joint stiffness of back and knees.   - Previous workup negative for anti-dsDNA, anti-Cobb, anti-RNP, rheumatoid factor. ERIN titer 1:80.   - hold home colchicine until confirmation from outpatient rheum  - collateral from Dr. Daly history of chronic morning joint stiffness of back and knees.   - Previous workup negative for anti-dsDNA, anti-Cobb, anti-RNP, rheumatoid factor. ERIN titer 1:80.   - hold home colchicine since cases report colchicine causing thrombocytopenia  - collateral from Dr. Daly

## 2021-07-16 LAB
ALBUMIN SERPL ELPH-MCNC: 2.8 G/DL — LOW (ref 3.3–5)
ALP SERPL-CCNC: 62 U/L — SIGNIFICANT CHANGE UP (ref 40–120)
ALT FLD-CCNC: 28 U/L — SIGNIFICANT CHANGE UP (ref 10–45)
ANION GAP SERPL CALC-SCNC: 7 MMOL/L — SIGNIFICANT CHANGE UP (ref 5–17)
ANISOCYTOSIS BLD QL: SLIGHT — SIGNIFICANT CHANGE UP
APTT BLD: 28.7 SEC — SIGNIFICANT CHANGE UP (ref 27.5–35.5)
AST SERPL-CCNC: 45 U/L — HIGH (ref 10–40)
BASOPHILS # BLD AUTO: 0.07 K/UL — SIGNIFICANT CHANGE UP (ref 0–0.2)
BASOPHILS NFR BLD AUTO: 2.7 % — HIGH (ref 0–2)
BILIRUB SERPL-MCNC: 0.5 MG/DL — SIGNIFICANT CHANGE UP (ref 0.2–1.2)
BUN SERPL-MCNC: 16 MG/DL — SIGNIFICANT CHANGE UP (ref 7–23)
CALCIUM SERPL-MCNC: 8.5 MG/DL — SIGNIFICANT CHANGE UP (ref 8.4–10.5)
CHLORIDE SERPL-SCNC: 106 MMOL/L — SIGNIFICANT CHANGE UP (ref 96–108)
CO2 SERPL-SCNC: 23 MMOL/L — SIGNIFICANT CHANGE UP (ref 22–31)
CREAT SERPL-MCNC: 1 MG/DL — SIGNIFICANT CHANGE UP (ref 0.5–1.3)
EOSINOPHIL # BLD AUTO: 0.12 K/UL — SIGNIFICANT CHANGE UP (ref 0–0.5)
EOSINOPHIL NFR BLD AUTO: 4.4 % — SIGNIFICANT CHANGE UP (ref 0–6)
GIANT PLATELETS BLD QL SMEAR: PRESENT — SIGNIFICANT CHANGE UP
GLUCOSE SERPL-MCNC: 86 MG/DL — SIGNIFICANT CHANGE UP (ref 70–99)
HCT VFR BLD CALC: 36.5 % — SIGNIFICANT CHANGE UP (ref 34.5–45)
HGB BLD-MCNC: 12.1 G/DL — SIGNIFICANT CHANGE UP (ref 11.5–15.5)
INR BLD: 1.04 — SIGNIFICANT CHANGE UP (ref 0.88–1.16)
LYMPHOCYTES # BLD AUTO: 1.47 K/UL — SIGNIFICANT CHANGE UP (ref 1–3.3)
LYMPHOCYTES # BLD AUTO: 54 % — HIGH (ref 13–44)
MAGNESIUM SERPL-MCNC: 2.2 MG/DL — SIGNIFICANT CHANGE UP (ref 1.6–2.6)
MANUAL SMEAR VERIFICATION: SIGNIFICANT CHANGE UP
MCHC RBC-ENTMCNC: 31.3 PG — SIGNIFICANT CHANGE UP (ref 27–34)
MCHC RBC-ENTMCNC: 33.2 GM/DL — SIGNIFICANT CHANGE UP (ref 32–36)
MCV RBC AUTO: 94.3 FL — SIGNIFICANT CHANGE UP (ref 80–100)
MONOCYTES # BLD AUTO: 0.29 K/UL — SIGNIFICANT CHANGE UP (ref 0–0.9)
MONOCYTES NFR BLD AUTO: 10.6 % — SIGNIFICANT CHANGE UP (ref 2–14)
NEUTROPHILS # BLD AUTO: 0.7 K/UL — LOW (ref 1.8–7.4)
NEUTROPHILS NFR BLD AUTO: 20.3 % — LOW (ref 43–77)
NEUTS BAND # BLD: 5.3 % — SIGNIFICANT CHANGE UP (ref 0–8)
OVALOCYTES BLD QL SMEAR: SLIGHT — SIGNIFICANT CHANGE UP
PHOSPHATE SERPL-MCNC: 3.5 MG/DL — SIGNIFICANT CHANGE UP (ref 2.5–4.5)
PLAT MORPH BLD: ABNORMAL
PLATELET # BLD AUTO: 2 K/UL — CRITICAL LOW (ref 150–400)
POTASSIUM SERPL-MCNC: 4 MMOL/L — SIGNIFICANT CHANGE UP (ref 3.5–5.3)
POTASSIUM SERPL-SCNC: 4 MMOL/L — SIGNIFICANT CHANGE UP (ref 3.5–5.3)
PROT SERPL-MCNC: 8.7 G/DL — HIGH (ref 6–8.3)
PROTHROM AB SERPL-ACNC: 12.5 SEC — SIGNIFICANT CHANGE UP (ref 10.6–13.6)
RBC # BLD: 3.87 M/UL — SIGNIFICANT CHANGE UP (ref 3.8–5.2)
RBC # FLD: 11.9 % — SIGNIFICANT CHANGE UP (ref 10.3–14.5)
RBC BLD AUTO: ABNORMAL
SMUDGE CELLS # BLD: PRESENT — SIGNIFICANT CHANGE UP
SODIUM SERPL-SCNC: 136 MMOL/L — SIGNIFICANT CHANGE UP (ref 135–145)
VARIANT LYMPHS # BLD: 2.7 % — SIGNIFICANT CHANGE UP (ref 0–6)
WBC # BLD: 2.73 K/UL — LOW (ref 3.8–10.5)
WBC # FLD AUTO: 2.73 K/UL — LOW (ref 3.8–10.5)

## 2021-07-16 PROCEDURE — 99233 SBSQ HOSP IP/OBS HIGH 50: CPT | Mod: GC

## 2021-07-16 RX ORDER — DIPHENHYDRAMINE HCL 50 MG
50 CAPSULE ORAL ONCE
Refills: 0 | Status: COMPLETED | OUTPATIENT
Start: 2021-07-16 | End: 2021-07-16

## 2021-07-16 RX ORDER — COLCHICINE 0.6 MG
0.6 TABLET ORAL DAILY
Refills: 0 | Status: DISCONTINUED | OUTPATIENT
Start: 2021-07-16 | End: 2021-07-16

## 2021-07-16 RX ORDER — IMMUNE GLOBULIN (HUMAN) 10 G/100ML
65 INJECTION INTRAVENOUS; SUBCUTANEOUS ONCE
Refills: 0 | Status: COMPLETED | OUTPATIENT
Start: 2021-07-16 | End: 2021-07-16

## 2021-07-16 RX ORDER — ACETAMINOPHEN 500 MG
650 TABLET ORAL ONCE
Refills: 0 | Status: COMPLETED | OUTPATIENT
Start: 2021-07-16 | End: 2021-07-16

## 2021-07-16 RX ADMIN — Medication 650 MILLIGRAM(S): at 21:50

## 2021-07-16 RX ADMIN — Medication 50 MILLIGRAM(S): at 21:50

## 2021-07-16 RX ADMIN — IMMUNE GLOBULIN (HUMAN) 162.5 GRAM(S): 10 INJECTION INTRAVENOUS; SUBCUTANEOUS at 23:42

## 2021-07-16 RX ADMIN — Medication 137 MICROGRAM(S): at 08:08

## 2021-07-16 RX ADMIN — SERTRALINE 50 MILLIGRAM(S): 25 TABLET, FILM COATED ORAL at 11:50

## 2021-07-16 NOTE — PROGRESS NOTE ADULT - PROBLEM SELECTOR PLAN 3
Chronic joint stiffness of entire body including back and knees. Following outpatient rheumatologist Dr. Daly for arthritis in setting of thrombocytopenia. ERIN 1:80 on 6/24. Anti-dsDNA, anti-RNP, anti-Smith, rheumatoid factor negative 6/24.   -appreciate hem/onc recs, will not consult rheum at this time  -started colchicine 0.6mg qD for inflammation per rheumatology recs Chronic joint stiffness of entire body including back and knees. Following outpatient rheumatologist Dr. Daly for arthritis in setting of thrombocytopenia. ERIN 1:80 on 6/24. Anti-dsDNA, anti-RNP, anti-Smith, rheumatoid factor negative 6/24. Taking colchicine 0.6mg qD as per outpatient rheum.   -appreciate hem/onc recs, will not consult rheum at this time  -c/w colchicine Chronic joint stiffness of entire body including back and knees. Following outpatient rheumatologist Dr. Daly for arthritis in setting of thrombocytopenia. ERIN 1:80 on 6/24. Anti-dsDNA, anti-RNP, anti-Smith, rheumatoid factor negative 6/24. Taking colchicine 0.6mg qD as per outpatient rheum.   -appreciate hem/onc recs, will not consult rheum at this time  -c/w home colchicine Chronic joint stiffness of entire body including back and knees. Following outpatient rheumatologist Dr. Daly for arthritis in setting of thrombocytopenia. ERIN 1:80 on 6/24. Anti-dsDNA, anti-RNP, anti-Smith, rheumatoid factor negative 6/24. Taking colchicine 0.6mg qD prescribed by outpatient rheum.   -appreciate hem/onc recs, will not consult rheum at this time  -hold home colchicine as there are cases reporting thrombocytopenia

## 2021-07-16 NOTE — PROGRESS NOTE ADULT - PROBLEM SELECTOR PLAN 1
First admission 6/23-6/25 for thrombocytopenia, probably ITP as diagnosis. Last admission, pt received 4 days of Decadron and 2 days IVIG, discharged with plt 67. Had prior workup for thrombocytopenia as follows: HIV and HCV negative. RF WNL, B12/Folate WNL. US abdomen did not show hepatosplenomegaly. H. pylori ab positive, was not able to follow up with rheum outpatient. Now back to ED for thrombocytopenia "plt 2" on outpatient heme/onc labs, plt count 1 in ED. Received 65mg IVIG yesterday.   -Heme/onc following   -65mg IVIG 1-2 days, appreciate heme/onc recs  -Monitor CBC  -Maintain active T + S, transfuse platelets if clinically important bleeding develops First admission 6/23-6/25 for thrombocytopenia, probably ITP as diagnosis. Last admission, pt received 4 days of Decadron and 2 days IVIG, discharged with plt 67. Had prior workup for thrombocytopenia as follows: HIV and HCV negative. RF WNL, B12/Folate WNL. US abdomen did not show hepatosplenomegaly. H. pylori ab positive, was not able to follow up with rheum outpatient. Now back to ED for thrombocytopenia "plt 2" on outpatient heme/onc labs, plt count 1 in ED. Received 65mg IVIG yesterday.   -Heme/onc following   -65mg IVIG 1-2 days (on day 2/2), appreciate heme/onc recs  -Monitor CBC  -Maintain active T + S, transfuse platelets if clinically important bleeding develops First admission 6/23-6/25 for thrombocytopenia, probably ITP as diagnosis. Last admission, pt received 4 days of Decadron and 2 days IVIG, discharged with plt 67. Had prior workup for thrombocytopenia as follows: HIV and HCV negative. RF WNL, B12/Folate WNL. US abdomen did not show hepatosplenomegaly. H. pylori ab positive, was not able to follow up with rheum outpatient. Now back to ED for thrombocytopenia "plt 2" on outpatient heme/onc labs, plt count 1 in ED. Received 65mg IVIG yesterday.   -Heme/onc following   -65mg IVIG 1-2 days (on day 2/2), appreciate heme/onc recs  -Monitor CBC  -Maintain active T + S, transfuse platelets if clinically important bleeding develops  -Holding off steroids because prior admission was given steroids and now has recurrence so condition seems refractory to steroids First admission 6/23-6/25 for thrombocytopenia, probably ITP as diagnosis. Last admission, pt received 4 days of Decadron and 2 days IVIG, discharged with plt 67. Had prior workup for thrombocytopenia as follows: HIV and HCV negative. RF WNL, B12/Folate WNL. US abdomen did not show hepatosplenomegaly. H. pylori ab positive, was not able to follow up with rheum outpatient. Now back to ED for thrombocytopenia "plt 2" on outpatient heme/onc labs, plt count 1 in ED. Received 65mg IVIG yesterday.   -Heme/onc following   -May do 65mg IVIG day 2/2, appreciate heme/onc recs  -Monitor CBC  -Maintain active T + S, transfuse platelets if clinically important bleeding develops  -Holding off steroids because prior admission was given steroids and now has recurrence so condition seems refractory to steroids  -will consider Rituximab or splenectomy, appreciate heme/onc recs

## 2021-07-16 NOTE — PROGRESS NOTE ADULT - ASSESSMENT
62 y/o F, with a past medical history of hypothyroidism, HLD, depression, arthritis, ITP (new diagnosis, initially admitted 6/23) sent to ED by heme/onc outpt for plt count "2", found to be thrombocytopenic, admitted now for further management of thrombocytopenia 2/2 to ITP. 62 y/o F, with a past medical history of hypothyroidism, HLD, depression, arthritis, ITP (new diagnosis, initially admitted 6/23) sent to ED by heme/onc outpt for plt count "2", found to be thrombocytopenic, admitted now for further management of severe thrombocytopenia 2/2 to refractory ITP.

## 2021-07-16 NOTE — PROGRESS NOTE ADULT - SUBJECTIVE AND OBJECTIVE BOX
OVERNIGHT EVENTS: No acute events.    SUBJECTIVE / INTERVAL HPI: 60 y/o F, with a past medical history of hypothyroidism, HLD, depression, arthritis, ITP (new diagnosis, first admitted 6/23) presented to the ED after plt count "<2" at heme/onc Dr. Fulton office, found to be thrombocytopenic. Pt stated that she is feeling well. Pt stated that there is no progression of the old bruises on her arms but reports new bruises since last admission.  Pt denied any weakness, dizziness, hematuria, gingival bleeding, melena, diarrhea. Pt noted some back stiffness and pressure headaches since admission last night. This is her third admission to the hospital. Patient seen and examined at bedside.    VITAL SIGNS:  Vital Signs Last 24 Hrs  T(C): 36.6 (16 Jul 2021 05:13), Max: 37.2 (15 Jul 2021 19:02)  T(F): 97.8 (16 Jul 2021 05:13), Max: 98.9 (15 Jul 2021 19:02)  HR: 72 (16 Jul 2021 05:13) (61 - 96)  BP: 124/77 (16 Jul 2021 05:13) (118/80 - 154/75)  BP(mean): --  RR: 18 (16 Jul 2021 05:13) (16 - 20)  SpO2: 96% (16 Jul 2021 05:13) (96% - 98%)      PHYSICAL EXAM:    General: WDWN, NAD, talkative, able to sit up and move around.   HEENT: NC/AT; PERRL, anicteric sclera; MMM; Small healing ulcer/petechiae noted on palate, no new petechiae compared to last admission.   Neck: supple  Cardiovascular: +S1/S2; RRR  Respiratory: CTA B/L; no W/R/R  Gastrointestinal: soft, NT/ND; +BSx4  Extremities: WWP; Diffuse ecchymoses throughout upper extremities, with new ecchymoses from last admission. New ecchymosis left lower extremity lateral to shin. New ecchymosis on left side of abdomen. Diffuse petechiae throughout extremities and shoulders.   Vascular: 2+ radial, DP/PT pulses B/L  Neurological: AAOx3; no focal deficits    MEDICATIONS:  MEDICATIONS  (STANDING):  levothyroxine 137 MICROGram(s) Oral <User Schedule>  levothyroxine 125 MICROGram(s) Oral <User Schedule>  sertraline 50 milliGRAM(s) Oral daily  simvastatin 10 milliGRAM(s) Oral at bedtime    MEDICATIONS  (PRN):      ALLERGIES:  Allergies    azithromycin (Rash)  penicillin (Rash)  vancomycin (Rash)    Intolerances        LABS:                        13.4   4.27  )-----------( 1        ( 15 Jul 2021 15:13 )             39.6     07-15    138  |  105  |  16  ----------------------------<  105<H>  4.0   |  23  |  1.18    Ca    8.4      15 Jul 2021 20:05    TPro  7.0  /  Alb  3.2<L>  /  TBili  0.4  /  DBili  x   /  AST  39  /  ALT  26  /  AlkPhos  63  07-15    PT/INR - ( 15 Jul 2021 15:13 )   PT: 12.1 sec;   INR: 1.01          PTT - ( 15 Jul 2021 15:13 )  PTT:27.5 sec    CAPILLARY BLOOD GLUCOSE            RADIOLOGY & ADDITIONAL TESTS: Reviewed.   OVERNIGHT EVENTS: No acute events.    SUBJECTIVE / INTERVAL HPI: 62 y/o F, with a past medical history of hypothyroidism, HLD, depression, arthritis, ITP (new diagnosis, first admitted 6/23) presented to the ED after plt count "<2" at heme/onc Dr. Fulton office, found to be thrombocytopenic. Pt stated that she is feeling well. Pt stated that there is no progression of the old bruises on her arms but reports new bruises since last admission.  Pt denied any weakness, dizziness, hematuria, gingival bleeding, melena, diarrhea. Pt noted some back stiffness and pressure headaches since admission last night. This is her third admission to the hospital thrombocytopenia/ITP. Patient seen and examined at bedside.    VITAL SIGNS:  Vital Signs Last 24 Hrs  T(C): 36.6 (16 Jul 2021 05:13), Max: 37.2 (15 Jul 2021 19:02)  T(F): 97.8 (16 Jul 2021 05:13), Max: 98.9 (15 Jul 2021 19:02)  HR: 72 (16 Jul 2021 05:13) (61 - 96)  BP: 124/77 (16 Jul 2021 05:13) (118/80 - 154/75)  BP(mean): --  RR: 18 (16 Jul 2021 05:13) (16 - 20)  SpO2: 96% (16 Jul 2021 05:13) (96% - 98%)      PHYSICAL EXAM:    General: WDWN, NAD, talkative, able to sit up and move around.   HEENT: NC/AT; PERRL, anicteric sclera; MMM; Small healing ulcer/petechiae noted on palate, no new petechiae compared to last admission.   Neck: supple  Cardiovascular: +S1/S2; RRR  Respiratory: CTA B/L; no W/R/R  Gastrointestinal: soft, NT/ND; +BSx4  Extremities: WWP; Diffuse ecchymoses throughout upper extremities, with new ecchymoses from last admission. New ecchymosis left lower extremity lateral to shin. New ecchymosis on left side of abdomen. Diffuse petechiae throughout extremities and shoulders.   Vascular: 2+ radial, DP/PT pulses B/L  Neurological: AAOx3; no focal deficits    MEDICATIONS:  MEDICATIONS  (STANDING):  levothyroxine 137 MICROGram(s) Oral <User Schedule>  levothyroxine 125 MICROGram(s) Oral <User Schedule>  sertraline 50 milliGRAM(s) Oral daily  simvastatin 10 milliGRAM(s) Oral at bedtime    MEDICATIONS  (PRN):      ALLERGIES:  Allergies    azithromycin (Rash)  penicillin (Rash)  vancomycin (Rash)    Intolerances        LABS:                        13.4   4.27  )-----------( 1        ( 15 Jul 2021 15:13 )             39.6     07-15    138  |  105  |  16  ----------------------------<  105<H>  4.0   |  23  |  1.18    Ca    8.4      15 Jul 2021 20:05    TPro  7.0  /  Alb  3.2<L>  /  TBili  0.4  /  DBili  x   /  AST  39  /  ALT  26  /  AlkPhos  63  07-15    PT/INR - ( 15 Jul 2021 15:13 )   PT: 12.1 sec;   INR: 1.01          PTT - ( 15 Jul 2021 15:13 )  PTT:27.5 sec    CAPILLARY BLOOD GLUCOSE            RADIOLOGY & ADDITIONAL TESTS: Reviewed.

## 2021-07-16 NOTE — PROGRESS NOTE ADULT - PROBLEM SELECTOR PLAN 2
Initially admitted 6/23-6/25 for thrombocytopenia, probable ITP as new diagnosis.  -See above for plan Initially admitted 6/23-6/25 for thrombocytopenia, ITP as new diagnosis.  -See above for plan

## 2021-07-16 NOTE — PROGRESS NOTE ADULT - PROBLEM SELECTOR PLAN 7
F: none indicated  E: replete if K<4 or Mg<2  Diet: DASH/TLC diet  DVT prophylaxis: Lovenox not indicated in setting of severe thrombocytopenia F: none indicated  E: replete if K<4 or Mg<2  Diet: DASH/TLC diet  DVT prophylaxis: SCIDs given thrombocytopenia F: none indicated  E: replete if K<4 or Mg<2  Diet: DASH/TLC diet  DVT prophylaxis: SCIDs given thrombocytopenia  FULL CODE

## 2021-07-16 NOTE — PROGRESS NOTE ADULT - PROBLEM SELECTOR PLAN 6
Known HLD. Takes Ezetimibe 10mg qD at home.   -C/w home meds Known HLD. Takes Ezetimibe 10mg qD at home.   -will hold therapeutic interchange with statins as pt does not tolerate statins

## 2021-07-16 NOTE — PROGRESS NOTE ADULT - PROBLEM SELECTOR PLAN 4
Known hypothyroidism. Takes levothyroxine 137mcg 5 times a week and 125mcg 2 times a week at home.   -C/w home meds Known hypothyroidism. Takes levothyroxine 137mcg 5 times a week and 125mcg 2 times a week (weekends) at home.   -C/w home levothyroxine regimen

## 2021-07-16 NOTE — PROGRESS NOTE ADULT - SUBJECTIVE AND OBJECTIVE BOX
INTERVAL HPI/OVERNIGHT EVENTS:  Pt was seen and examined at the bedside. She was observed to be lying down comfortably.   Offers no complaints.      VITAL SIGNS:  T(F): 98.3 (07-16-21 @ 09:30)  HR: 77 (07-16-21 @ 09:30)  BP: 128/86 (07-16-21 @ 09:30)  RR: 18 (07-16-21 @ 09:30)  SpO2: 97% (07-16-21 @ 09:30)  Wt(kg): --  I&O's Summary    PHYSICAL EXAM:  Constitutional: NAD, well-groomed, well-developed  HEENT: PERRLA, EOMI, Normal Hearing, MMM  Neck: No LAD, No JVD  Back: Normal spine flexure, No CVA tenderness  Respiratory: CTAB  Cardiovascular: S1 and S2, RRR, no M/G/R  Gastrointestinal: BS+, soft, NT/ND  Extremities: No peripheral edema  Vascular: 2+ peripheral pulses  Neurological: A/O x 3, no focal deficits  Psychiatric: Normal mood, normal affect  Musculoskeletal: 5/5 strength b/l upper and lower extremities  Skin: Bruising on B/L UE        MEDICATIONS  (STANDING):  levothyroxine 137 MICROGram(s) Oral <User Schedule>  levothyroxine 125 MICROGram(s) Oral <User Schedule>  sertraline 50 milliGRAM(s) Oral daily    MEDICATIONS  (PRN):      Allergies    azithromycin (Rash)  penicillin (Rash)  vancomycin (Rash)    Intolerances        LABS:  CBC Full  -  ( 15 Jul 2021 15:13 )  WBC Count : 4.27 K/uL  RBC Count : 4.21 M/uL  Hemoglobin : 13.4 g/dL  Hematocrit : 39.6 %  Platelet Count - Automated : 1 K/uL  Mean Cell Volume : 94.1 fl  Mean Cell Hemoglobin : 31.8 pg  Mean Cell Hemoglobin Concentration : 33.8 gm/dL  Auto Neutrophil # : 2.29 K/uL  Auto Lymphocyte # : 1.49 K/uL  Auto Monocyte # : 0.30 K/uL  Auto Eosinophil # : 0.08 K/uL  Auto Basophil # : 0.04 K/uL  Auto Neutrophil % : 51.8 %  Auto Lymphocyte % : 34.8 %  Auto Monocyte % : 7.1 %  Auto Eosinophil % : 1.8 %  Auto Basophil % : 0.9 %    07-15    138  |  105  |  16  ----------------------------<  105<H>  4.0   |  23  |  1.18    Ca    8.4      15 Jul 2021 20:05    TPro  7.0  /  Alb  3.2<L>  /  TBili  0.4  /  DBili  x   /  AST  39  /  ALT  26  /  AlkPhos  63  07-15    PT/INR - ( 15 Jul 2021 15:13 )   PT: 12.1 sec;   INR: 1.01          PTT - ( 15 Jul 2021 15:13 )  PTT:27.5 sec        INR: 1.01 (07-15-21 @ 15:13)  Activated Partial Thromboplastin Time: 27.5 sec (07-15-21 @ 15:13)      RADIOLOGY & ADDITIONAL TESTS: Reviewed.

## 2021-07-17 LAB
ALBUMIN SERPL ELPH-MCNC: 2.8 G/DL — LOW (ref 3.3–5)
ALBUMIN SERPL ELPH-MCNC: 2.9 G/DL — LOW (ref 3.3–5)
ALP SERPL-CCNC: 67 U/L — SIGNIFICANT CHANGE UP (ref 40–120)
ALP SERPL-CCNC: 74 U/L — SIGNIFICANT CHANGE UP (ref 40–120)
ALT FLD-CCNC: 41 U/L — SIGNIFICANT CHANGE UP (ref 10–45)
ALT FLD-CCNC: 46 U/L — HIGH (ref 10–45)
ANION GAP SERPL CALC-SCNC: 7 MMOL/L — SIGNIFICANT CHANGE UP (ref 5–17)
ANION GAP SERPL CALC-SCNC: 8 MMOL/L — SIGNIFICANT CHANGE UP (ref 5–17)
AST SERPL-CCNC: 60 U/L — HIGH (ref 10–40)
AST SERPL-CCNC: 70 U/L — HIGH (ref 10–40)
BASOPHILS # BLD AUTO: 0.05 K/UL — SIGNIFICANT CHANGE UP (ref 0–0.2)
BASOPHILS # BLD AUTO: 0.07 K/UL — SIGNIFICANT CHANGE UP (ref 0–0.2)
BASOPHILS NFR BLD AUTO: 1.6 % — SIGNIFICANT CHANGE UP (ref 0–2)
BASOPHILS NFR BLD AUTO: 2.1 % — HIGH (ref 0–2)
BILIRUB SERPL-MCNC: 0.5 MG/DL — SIGNIFICANT CHANGE UP (ref 0.2–1.2)
BILIRUB SERPL-MCNC: 0.6 MG/DL — SIGNIFICANT CHANGE UP (ref 0.2–1.2)
BLD GP AB SCN SERPL QL: NEGATIVE — SIGNIFICANT CHANGE UP
BUN SERPL-MCNC: 13 MG/DL — SIGNIFICANT CHANGE UP (ref 7–23)
BUN SERPL-MCNC: 17 MG/DL — SIGNIFICANT CHANGE UP (ref 7–23)
CALCIUM SERPL-MCNC: 8.4 MG/DL — SIGNIFICANT CHANGE UP (ref 8.4–10.5)
CALCIUM SERPL-MCNC: 8.9 MG/DL — SIGNIFICANT CHANGE UP (ref 8.4–10.5)
CHLORIDE SERPL-SCNC: 103 MMOL/L — SIGNIFICANT CHANGE UP (ref 96–108)
CHLORIDE SERPL-SCNC: 104 MMOL/L — SIGNIFICANT CHANGE UP (ref 96–108)
CLOSURE TME COLL+EPINEP BLD: 2 K/UL — CRITICAL LOW (ref 150–400)
CO2 SERPL-SCNC: 21 MMOL/L — LOW (ref 22–31)
CO2 SERPL-SCNC: 23 MMOL/L — SIGNIFICANT CHANGE UP (ref 22–31)
COVID-19 SPIKE DOMAIN AB INTERP: POSITIVE
COVID-19 SPIKE DOMAIN ANTIBODY RESULT: >250 U/ML — HIGH
CREAT SERPL-MCNC: 1.03 MG/DL — SIGNIFICANT CHANGE UP (ref 0.5–1.3)
CREAT SERPL-MCNC: 1.19 MG/DL — SIGNIFICANT CHANGE UP (ref 0.5–1.3)
EOSINOPHIL # BLD AUTO: 0.04 K/UL — SIGNIFICANT CHANGE UP (ref 0–0.5)
EOSINOPHIL # BLD AUTO: 0.05 K/UL — SIGNIFICANT CHANGE UP (ref 0–0.5)
EOSINOPHIL NFR BLD AUTO: 1.3 % — SIGNIFICANT CHANGE UP (ref 0–6)
EOSINOPHIL NFR BLD AUTO: 1.5 % — SIGNIFICANT CHANGE UP (ref 0–6)
GLUCOSE SERPL-MCNC: 76 MG/DL — SIGNIFICANT CHANGE UP (ref 70–99)
GLUCOSE SERPL-MCNC: 99 MG/DL — SIGNIFICANT CHANGE UP (ref 70–99)
HCT VFR BLD CALC: 37.7 % — SIGNIFICANT CHANGE UP (ref 34.5–45)
HCT VFR BLD CALC: 38.3 % — SIGNIFICANT CHANGE UP (ref 34.5–45)
HGB BLD-MCNC: 12.6 G/DL — SIGNIFICANT CHANGE UP (ref 11.5–15.5)
HGB BLD-MCNC: 12.7 G/DL — SIGNIFICANT CHANGE UP (ref 11.5–15.5)
IMM GRANULOCYTES NFR BLD AUTO: 0.3 % — SIGNIFICANT CHANGE UP (ref 0–1.5)
IMM GRANULOCYTES NFR BLD AUTO: 1 % — SIGNIFICANT CHANGE UP (ref 0–1.5)
LYMPHOCYTES # BLD AUTO: 1.14 K/UL — SIGNIFICANT CHANGE UP (ref 1–3.3)
LYMPHOCYTES # BLD AUTO: 1.32 K/UL — SIGNIFICANT CHANGE UP (ref 1–3.3)
LYMPHOCYTES # BLD AUTO: 36.5 % — SIGNIFICANT CHANGE UP (ref 13–44)
LYMPHOCYTES # BLD AUTO: 40.1 % — SIGNIFICANT CHANGE UP (ref 13–44)
MAGNESIUM SERPL-MCNC: 2.3 MG/DL — SIGNIFICANT CHANGE UP (ref 1.6–2.6)
MCHC RBC-ENTMCNC: 31.2 PG — SIGNIFICANT CHANGE UP (ref 27–34)
MCHC RBC-ENTMCNC: 31.7 PG — SIGNIFICANT CHANGE UP (ref 27–34)
MCHC RBC-ENTMCNC: 33.2 GM/DL — SIGNIFICANT CHANGE UP (ref 32–36)
MCHC RBC-ENTMCNC: 33.4 GM/DL — SIGNIFICANT CHANGE UP (ref 32–36)
MCV RBC AUTO: 93.3 FL — SIGNIFICANT CHANGE UP (ref 80–100)
MCV RBC AUTO: 95.5 FL — SIGNIFICANT CHANGE UP (ref 80–100)
MONOCYTES # BLD AUTO: 0.46 K/UL — SIGNIFICANT CHANGE UP (ref 0–0.9)
MONOCYTES # BLD AUTO: 0.47 K/UL — SIGNIFICANT CHANGE UP (ref 0–0.9)
MONOCYTES NFR BLD AUTO: 14.3 % — HIGH (ref 2–14)
MONOCYTES NFR BLD AUTO: 14.7 % — HIGH (ref 2–14)
NEUTROPHILS # BLD AUTO: 1.37 K/UL — LOW (ref 1.8–7.4)
NEUTROPHILS # BLD AUTO: 1.4 K/UL — LOW (ref 1.8–7.4)
NEUTROPHILS NFR BLD AUTO: 41.7 % — LOW (ref 43–77)
NEUTROPHILS NFR BLD AUTO: 44.9 % — SIGNIFICANT CHANGE UP (ref 43–77)
NRBC # BLD: 0 /100 WBCS — SIGNIFICANT CHANGE UP (ref 0–0)
NRBC # BLD: 0 /100 WBCS — SIGNIFICANT CHANGE UP (ref 0–0)
PHOSPHATE SERPL-MCNC: 3.2 MG/DL — SIGNIFICANT CHANGE UP (ref 2.5–4.5)
PLATELET # BLD AUTO: 2 K/UL — CRITICAL LOW (ref 150–400)
PLATELET # BLD AUTO: 3 K/UL — CRITICAL LOW (ref 150–400)
POTASSIUM SERPL-MCNC: 3.9 MMOL/L — SIGNIFICANT CHANGE UP (ref 3.5–5.3)
POTASSIUM SERPL-MCNC: 4.1 MMOL/L — SIGNIFICANT CHANGE UP (ref 3.5–5.3)
POTASSIUM SERPL-SCNC: 3.9 MMOL/L — SIGNIFICANT CHANGE UP (ref 3.5–5.3)
POTASSIUM SERPL-SCNC: 4.1 MMOL/L — SIGNIFICANT CHANGE UP (ref 3.5–5.3)
PROT SERPL-MCNC: 9.4 G/DL — HIGH (ref 6–8.3)
PROT SERPL-MCNC: 9.9 G/DL — HIGH (ref 6–8.3)
RBC # BLD: 4.01 M/UL — SIGNIFICANT CHANGE UP (ref 3.8–5.2)
RBC # BLD: 4.04 M/UL — SIGNIFICANT CHANGE UP (ref 3.8–5.2)
RBC # FLD: 11.9 % — SIGNIFICANT CHANGE UP (ref 10.3–14.5)
RBC # FLD: 11.9 % — SIGNIFICANT CHANGE UP (ref 10.3–14.5)
RH IG SCN BLD-IMP: POSITIVE — SIGNIFICANT CHANGE UP
SARS-COV-2 IGG+IGM SERPL QL IA: >250 U/ML — HIGH
SARS-COV-2 IGG+IGM SERPL QL IA: POSITIVE
SODIUM SERPL-SCNC: 131 MMOL/L — LOW (ref 135–145)
SODIUM SERPL-SCNC: 135 MMOL/L — SIGNIFICANT CHANGE UP (ref 135–145)
WBC # BLD: 2.98 K/UL — LOW (ref 3.8–10.5)
WBC # BLD: 3.29 K/UL — LOW (ref 3.8–10.5)
WBC # FLD AUTO: 2.98 K/UL — LOW (ref 3.8–10.5)
WBC # FLD AUTO: 3.29 K/UL — LOW (ref 3.8–10.5)

## 2021-07-17 PROCEDURE — 70450 CT HEAD/BRAIN W/O DYE: CPT | Mod: 26

## 2021-07-17 PROCEDURE — 99233 SBSQ HOSP IP/OBS HIGH 50: CPT | Mod: GC

## 2021-07-17 RX ADMIN — Medication 125 MICROGRAM(S): at 08:06

## 2021-07-17 RX ADMIN — SERTRALINE 50 MILLIGRAM(S): 25 TABLET, FILM COATED ORAL at 11:54

## 2021-07-17 NOTE — PROGRESS NOTE ADULT - PROBLEM SELECTOR PLAN 6
Known HLD. Takes Ezetimibe 10mg qD at home.   -will hold therapeutic interchange with statins as pt does not tolerate statins

## 2021-07-17 NOTE — PROGRESS NOTE ADULT - PROBLEM SELECTOR PLAN 1
First admission 6/23-6/25 for thrombocytopenia, probably ITP as diagnosis. Last admission, pt received 4 days of Decadron and 2 days IVIG, discharged with plt 67. Had prior workup for thrombocytopenia as follows: HIV and HCV negative. RF WNL, B12/Folate WNL. US abdomen did not show hepatosplenomegaly. H. pylori ab positive, was not able to follow up with rheum outpatient. Now back to ED for thrombocytopenia "plt 2" on outpatient heme/onc labs, plt count 1 in ED. Received 65mg IVIG yesterday.   -Heme/onc following   -May do 65mg IVIG day 2/2, appreciate heme/onc recs  -Monitor CBC  -Maintain active T + S, transfuse platelets if clinically important bleeding develops  -Holding off steroids because prior admission was given steroids and now has recurrence so condition seems refractory to steroids  -will consider Rituximab or splenectomy, appreciate heme/onc recs First admission 6/23-6/25 for thrombocytopenia, probably ITP as diagnosis. Last admission, pt received 4 days of Decadron and 2 days IVIG, discharged with plt 67. Had prior workup for thrombocytopenia as follows: HIV and HCV negative. RF WNL, B12/Folate WNL. US abdomen did not show hepatosplenomegaly. H. pylori ab positive, was not able to follow up with rheum outpatient. Now back to ED for thrombocytopenia "plt 2" on outpatient heme/onc labs, plt count 1 in ED. Received 65mg IVIG on 7/16    -Heme/onc following   -platelet of 3 today  -given headache overnight, CT head ordered and showed no intracranial hemorrhage  -will continue to monitor for signs of bleeding  -Monitor CBC  -Maintain active T + S, transfuse platelets if clinically important bleeding develops  -will consider Rituximab or splenectomy, appreciate heme/onc recs

## 2021-07-17 NOTE — PROGRESS NOTE ADULT - ASSESSMENT
62 y/o F, with a past medical history of hypothyroidism, HLD, depression, arthritis, ITP (new diagnosis, initially admitted 6/23) sent to ED by heme/onc outpt for plt count "2", found to be thrombocytopenic, admitted now for further management of severe thrombocytopenia 2/2 to refractory ITP.

## 2021-07-17 NOTE — PROGRESS NOTE ADULT - SUBJECTIVE AND OBJECTIVE BOX
**INCOMPLETE NOTE    OVERNIGHT EVENTS:    SUBJECTIVE:  Patient seen and examined at bedside.    Vital Signs Last 12 Hrs  T(F): 99 (07-17-21 @ 09:06), Max: 100.2 (07-17-21 @ 05:33)  HR: 80 (07-17-21 @ 09:06) (78 - 80)  BP: 122/77 (07-17-21 @ 09:06) (122/77 - 126/75)  BP(mean): --  RR: 15 (07-17-21 @ 09:06) (15 - 18)  SpO2: 96% (07-17-21 @ 09:06) (96% - 97%)  I&O's Summary      PHYSICAL EXAM:  Constitutional: NAD, comfortable in bed.  HEENT: NC/AT, PERRLA, EOMI, no conjunctival pallor or scleral icterus, MMM  Neck: Supple, no JVD  Respiratory: CTA B/L. No w/r/r.   Cardiovascular: RRR, normal S1 and S2, no m/r/g.   Gastrointestinal: +BS, soft NTND, no guarding or rebound tenderness, no palpable masses   Extremities: wwp; no cyanosis, clubbing or edema.   Vascular: Pulses equal and strong throughout.   Neurological: AAOx3, no CN deficits, strength and sensation intact throughout.   Skin: No gross skin abnormalities or rashes        LABS:                        12.6   2.98  )-----------( 2        ( 17 Jul 2021 11:40 )             37.7     07-17    131<L>  |  103  |  17  ----------------------------<  76  3.9   |  21<L>  |  1.19    Ca    8.9      17 Jul 2021 11:40  Phos  3.5     07-16  Mg     2.2     07-16    TPro  9.9<H>  /  Alb  2.9<L>  /  TBili  0.6  /  DBili  x   /  AST  60<H>  /  ALT  41  /  AlkPhos  67  07-17    PT/INR - ( 16 Jul 2021 13:13 )   PT: 12.5 sec;   INR: 1.04          PTT - ( 16 Jul 2021 13:13 )  PTT:28.7 sec        RADIOLOGY & ADDITIONAL TESTS:    MEDICATIONS  (STANDING):  levothyroxine 137 MICROGram(s) Oral <User Schedule>  levothyroxine 125 MICROGram(s) Oral <User Schedule>  sertraline 50 milliGRAM(s) Oral daily    MEDICATIONS  (PRN):     OVERNIGHT EVENTS: GER[place]IA    SUBJECTIVE:  Patient seen and examined at bedside. Patient reports mild headache, described as tension rated 2/10 overnight. Patient states it improved with Tylenol, however, she continues to have 1/10 tension.     Vital Signs Last 12 Hrs  T(F): 99 (07-17-21 @ 09:06), Max: 100.2 (07-17-21 @ 05:33)  HR: 80 (07-17-21 @ 09:06) (78 - 80)  BP: 122/77 (07-17-21 @ 09:06) (122/77 - 126/75)  BP(mean): --  RR: 15 (07-17-21 @ 09:06) (15 - 18)  SpO2: 96% (07-17-21 @ 09:06) (96% - 97%)  I&O's Summary      PHYSICAL EXAM:  General: WDWN, NAD, talkative, able to sit up and move around.   HEENT: NC/AT; PERRL, anicteric sclera; MMM; Small healing ulcer/petechiae noted on palate, no new petechiae compared to last admission.   Neck: supple  Cardiovascular: +S1/S2; RRR  Respiratory: CTA B/L; no W/R/R  Gastrointestinal: soft, NT/ND; +BSx4  Extremities: WWP; Diffuse ecchymoses throughout upper extremities, with new ecchymoses from last admission. New ecchymosis left lower extremity lateral to shin. New ecchymosis on left side of abdomen. Diffuse petechiae throughout extremities and shoulders.   Vascular: 2+ radial, DP/PT pulses B/L  Neurological: AAOx3; no focal deficits        LABS:                        12.6   2.98  )-----------( 2        ( 17 Jul 2021 11:40 )             37.7     07-17    131<L>  |  103  |  17  ----------------------------<  76  3.9   |  21<L>  |  1.19    Ca    8.9      17 Jul 2021 11:40  Phos  3.5     07-16  Mg     2.2     07-16    TPro  9.9<H>  /  Alb  2.9<L>  /  TBili  0.6  /  DBili  x   /  AST  60<H>  /  ALT  41  /  AlkPhos  67  07-17    PT/INR - ( 16 Jul 2021 13:13 )   PT: 12.5 sec;   INR: 1.04          PTT - ( 16 Jul 2021 13:13 )  PTT:28.7 sec        RADIOLOGY & ADDITIONAL TESTS:    MEDICATIONS  (STANDING):  levothyroxine 137 MICROGram(s) Oral <User Schedule>  levothyroxine 125 MICROGram(s) Oral <User Schedule>  sertraline 50 milliGRAM(s) Oral daily    MEDICATIONS  (PRN):

## 2021-07-17 NOTE — PROGRESS NOTE ADULT - SUBJECTIVE AND OBJECTIVE BOX
INTERVAL HPI/OVERNIGHT EVENTS:  Pt was seen and examined at the bedside. Reports frontal headache which started this morning, described as a pressure like sensation behind her eyes. Reports blurry vision, which she attributes to her h/o macular degeneration, it has not worsened. Denies any weakness.     VITAL SIGNS:  Vital Signs Last 24 Hrs  T(C): 37.2 (17 Jul 2021 09:06), Max: 37.9 (17 Jul 2021 05:33)  T(F): 99 (17 Jul 2021 09:06), Max: 100.2 (17 Jul 2021 05:33)  HR: 80 (17 Jul 2021 09:06) (78 - 105)  BP: 122/77 (17 Jul 2021 09:06) (118/79 - 126/79)  RR: 15 (17 Jul 2021 09:06) (15 - 20)  SpO2: 96% (17 Jul 2021 09:06) (96% - 97%)    PHYSICAL EXAM:  Constitutional: NAD,  HEENT:EOMI, Normal Hearing, MMM  Neck: supple   Respiratory: CTAB  Cardiovascular: S1 and S2, RRR, no M/G/R  Gastrointestinal: BS+, soft, NT/ND  Extremities: No peripheral edema  Neurological: A/O x 3, no focal deficits, CN II-XII grossly intact   Psychiatric: Normal mood, normal affect  Musculoskeletal: 5/5 strength b/l upper and lower extremities  Skin: Bruising on B/L UE, left flank and LE       MEDICATIONS  (STANDING):  levothyroxine 137 MICROGram(s) Oral <User Schedule>  levothyroxine 125 MICROGram(s) Oral <User Schedule>  sertraline 50 milliGRAM(s) Oral daily    MEDICATIONS  (PRN):      Allergies    azithromycin (Rash)  penicillin (Rash)  vancomycin (Rash)    Intolerances      LABS:                          12.1   2.73  )-----------( 2        ( 16 Jul 2021 13:13 )             36.5     07-16    136  |  106  |  16  ----------------------------<  86  4.0   |  23  |  1.00    Ca    8.5      16 Jul 2021 13:13  Phos  3.5     07-16  Mg     2.2     07-16    TPro  8.7<H>  /  Alb  2.8<L>  /  TBili  0.5  /  DBili  x   /  AST  45<H>  /  ALT  28  /  AlkPhos  62  07-16    PT/INR - ( 16 Jul 2021 13:13 )   PT: 12.5 sec;   INR: 1.04     PTT - ( 16 Jul 2021 13:13 )  PTT:28.7 sec    RADIOLOGY & ADDITIONAL TESTS: Reviewed.    CT Head No Cont (07.17.21 @ 10:37)   IMPRESSION: No intracranial hemorrhage.    < end of copied text >

## 2021-07-17 NOTE — PROGRESS NOTE ADULT - PROBLEM SELECTOR PLAN 3
Chronic joint stiffness of entire body including back and knees. Following outpatient rheumatologist Dr. Daly for arthritis in setting of thrombocytopenia. ERIN 1:80 on 6/24. Anti-dsDNA, anti-RNP, anti-Smith, rheumatoid factor negative 6/24. Taking colchicine 0.6mg qD prescribed by outpatient rheum.   -appreciate hem/onc recs, will not consult rheum at this time  -hold home colchicine as there are cases reporting thrombocytopenia

## 2021-07-17 NOTE — PROGRESS NOTE ADULT - PROBLEM SELECTOR PLAN 4
Known hypothyroidism. Takes levothyroxine 137mcg 5 times a week and 125mcg 2 times a week (weekends) at home.   -C/w home levothyroxine regimen

## 2021-07-17 NOTE — PROGRESS NOTE ADULT - PROBLEM SELECTOR PLAN 7
F: none indicated  E: replete if K<4 or Mg<2  Diet: DASH/TLC diet  DVT prophylaxis: SCIDs given thrombocytopenia  FULL CODE

## 2021-07-17 NOTE — PROGRESS NOTE ADULT - ASSESSMENT
62 yo F, [place], with PMHx of ITP (on treatment with steroids/IVIG) hypothyroidism, hyperlipidemia, anxiety/depression, macular degeneration, bilateral knee osteoarthritis, was sent to ED for low platelets. Patient has had bruising on her arms/legs and petechiae. Hematology consulted for h/o ITP refractory to steroids.     ITP, refractory to steroids   Previously treated with IV dexamethasone 40mg QD x 4 days and IVIG x 2 doses. Had prior workup for thrombocytopenia as follows: HIV and HCV negative. RF WNL, B12/Folate WNL. Peripheral flow cytometry (06/23) did not show any circulating blasts or abnormal cell population. US Abdomen did not show hepatosplenomegaly. H. Pylori Abs positive, has not seen GI as outpatient yet. ERIN titer 1:80, seen by rheum  -on IVIG 1g/kg daily for 1-2 days, day 2/2 65 grams today  -Monitor CBC daily   -Monitor for signs and symptoms of bleeding   -Avoid platelet transfusion unless she is actively bleeding   -stop simvastatin as it has be linked with rare cases of DITP  -stop colchicine  -CT Head on 7/17 for headache, negative for intracranial hemorrhage  -will consider either Rituximab vs TPO agonist as next line of treatment      Discussed with Dr. Mercado

## 2021-07-18 LAB
ALBUMIN SERPL ELPH-MCNC: 2.6 G/DL — LOW (ref 3.3–5)
ALP SERPL-CCNC: 75 U/L — SIGNIFICANT CHANGE UP (ref 40–120)
ALT FLD-CCNC: 55 U/L — HIGH (ref 10–45)
ANION GAP SERPL CALC-SCNC: 5 MMOL/L — SIGNIFICANT CHANGE UP (ref 5–17)
AST SERPL-CCNC: 77 U/L — HIGH (ref 10–40)
BILIRUB SERPL-MCNC: 0.7 MG/DL — SIGNIFICANT CHANGE UP (ref 0.2–1.2)
BUN SERPL-MCNC: 13 MG/DL — SIGNIFICANT CHANGE UP (ref 7–23)
CALCIUM SERPL-MCNC: 8.5 MG/DL — SIGNIFICANT CHANGE UP (ref 8.4–10.5)
CHLORIDE SERPL-SCNC: 105 MMOL/L — SIGNIFICANT CHANGE UP (ref 96–108)
CO2 SERPL-SCNC: 22 MMOL/L — SIGNIFICANT CHANGE UP (ref 22–31)
CREAT SERPL-MCNC: 1.06 MG/DL — SIGNIFICANT CHANGE UP (ref 0.5–1.3)
GLUCOSE SERPL-MCNC: 92 MG/DL — SIGNIFICANT CHANGE UP (ref 70–99)
HCT VFR BLD CALC: 38.1 % — SIGNIFICANT CHANGE UP (ref 34.5–45)
HGB BLD-MCNC: 12.6 G/DL — SIGNIFICANT CHANGE UP (ref 11.5–15.5)
MAGNESIUM SERPL-MCNC: 2.3 MG/DL — SIGNIFICANT CHANGE UP (ref 1.6–2.6)
MCHC RBC-ENTMCNC: 31.3 PG — SIGNIFICANT CHANGE UP (ref 27–34)
MCHC RBC-ENTMCNC: 33.1 GM/DL — SIGNIFICANT CHANGE UP (ref 32–36)
MCV RBC AUTO: 94.5 FL — SIGNIFICANT CHANGE UP (ref 80–100)
NRBC # BLD: 0 /100 WBCS — SIGNIFICANT CHANGE UP (ref 0–0)
PHOSPHATE SERPL-MCNC: 3.4 MG/DL — SIGNIFICANT CHANGE UP (ref 2.5–4.5)
PLATELET # BLD AUTO: 2 K/UL — CRITICAL LOW (ref 150–400)
POTASSIUM SERPL-MCNC: 4.2 MMOL/L — SIGNIFICANT CHANGE UP (ref 3.5–5.3)
POTASSIUM SERPL-SCNC: 4.2 MMOL/L — SIGNIFICANT CHANGE UP (ref 3.5–5.3)
PROT SERPL-MCNC: 8.9 G/DL — HIGH (ref 6–8.3)
RBC # BLD: 4.03 M/UL — SIGNIFICANT CHANGE UP (ref 3.8–5.2)
RBC # FLD: 12 % — SIGNIFICANT CHANGE UP (ref 10.3–14.5)
SODIUM SERPL-SCNC: 132 MMOL/L — LOW (ref 135–145)
WBC # BLD: 2.69 K/UL — LOW (ref 3.8–10.5)
WBC # FLD AUTO: 2.69 K/UL — LOW (ref 3.8–10.5)

## 2021-07-18 PROCEDURE — 99232 SBSQ HOSP IP/OBS MODERATE 35: CPT | Mod: GC

## 2021-07-18 RX ORDER — DEXAMETHASONE 0.5 MG/5ML
40 ELIXIR ORAL EVERY 24 HOURS
Refills: 0 | Status: DISCONTINUED | OUTPATIENT
Start: 2021-07-18 | End: 2021-07-18

## 2021-07-18 RX ORDER — DEXAMETHASONE 0.5 MG/5ML
40 ELIXIR ORAL EVERY 24 HOURS
Refills: 0 | Status: COMPLETED | OUTPATIENT
Start: 2021-07-18 | End: 2021-07-21

## 2021-07-18 RX ADMIN — SERTRALINE 50 MILLIGRAM(S): 25 TABLET, FILM COATED ORAL at 12:45

## 2021-07-18 RX ADMIN — Medication 40 MILLIGRAM(S): at 12:45

## 2021-07-18 RX ADMIN — Medication 125 MICROGRAM(S): at 08:08

## 2021-07-18 NOTE — PROGRESS NOTE ADULT - PROBLEM SELECTOR PLAN 1
First admission 6/23-6/25 for thrombocytopenia, probably ITP as diagnosis. Last admission, pt received 4 days of Decadron and 2 days IVIG, discharged with plt 67. Had prior workup for thrombocytopenia as follows: HIV and HCV negative. RF WNL, B12/Folate WNL. US abdomen did not show hepatosplenomegaly. H. pylori ab positive, was not able to follow up with rheum outpatient. Now back to ED for thrombocytopenia "plt 2" on outpatient heme/onc labs, plt count 1 in ED. Received 65mg IVIG on 7/16 and 7/17.    -Heme/onc following   -platelet of 2 today  -given headache on 7/16, CT head ordered and showed no intracranial hemorrhage  -will continue to monitor for signs of bleeding    PLAN:  -Monitor CBC  -Maintain active T + S, transfuse platelets if clinically important bleeding develops  -refractory to IVIG and steroids will consider Rituximab or splenectomy, appreciate heme/onc recs

## 2021-07-18 NOTE — PROGRESS NOTE ADULT - SUBJECTIVE AND OBJECTIVE BOX
CC: Patient is a 61y old  Female who presents with a chief complaint of Thrombocytopenia 2/2 to ITP (16 Jul 2021 08:08)      INTERVAL EVENTS: GER[place]IA. Thrombocytopenia unchanged with 2/2 IVIG.     SUBJECTIVE / INTERVAL HPI: Patient seen and examined at bedside. Pt denies fevers, chills, chest pain, shortness of breath, cough, abdominal pain, nausea, vomiting, diarrhea, constipation, leg pain/swelling.     ROS: negative unless otherwise stated above.    VITAL SIGNS:  Vital Signs Last 24 Hrs  T(C): 36.3 (18 Jul 2021 09:55), Max: 37.2 (17 Jul 2021 15:57)  T(F): 97.4 (18 Jul 2021 09:55), Max: 98.9 (17 Jul 2021 15:57)  HR: 77 (18 Jul 2021 09:55) (77 - 88)  BP: 111/86 (18 Jul 2021 09:55) (110/70 - 113/77)  BP(mean): --  RR: 18 (18 Jul 2021 09:55) (18 - 19)  SpO2: 95% (18 Jul 2021 09:55) (93% - 96%)        PHYSICAL EXAM:    General: NAD  HEENT: MMM, healing ulcer in mouth  Neck: supple  Cardiovascular: +S1/S2; RRR  Respiratory: CTA B/L; no W/R/R  Gastrointestinal: soft, NT/ND  Extremities: WWP; brusing on arms and legs.  Vascular: 2+ radial, DP/PT pulses B/L  Neurological: AAOx3; no focal deficits    MEDICATIONS:  MEDICATIONS  (STANDING):  dexAMETHasone     Tablet 40 milliGRAM(s) Oral every 24 hours  levothyroxine 137 MICROGram(s) Oral <User Schedule>  levothyroxine 125 MICROGram(s) Oral <User Schedule>  sertraline 50 milliGRAM(s) Oral daily    MEDICATIONS  (PRN):      ALLERGIES:  Allergies    azithromycin (Rash)  penicillin (Rash)  vancomycin (Rash)    Intolerances        LABS:                        12.6   2.69  )-----------( 2        ( 18 Jul 2021 07:58 )             38.1     07-18    132<L>  |  105  |  13  ----------------------------<  92  4.2   |  22  |  1.06    Ca    8.5      18 Jul 2021 07:58  Phos  3.4     07-18  Mg     2.3     07-18    TPro  8.9<H>  /  Alb  2.6<L>  /  TBili  0.7  /  DBili  x   /  AST  77<H>  /  ALT  55<H>  /  AlkPhos  75  07-18        CAPILLARY BLOOD GLUCOSE          RADIOLOGY & ADDITIONAL TESTS: Reviewed.

## 2021-07-19 LAB
ALBUMIN SERPL ELPH-MCNC: 3.1 G/DL — LOW (ref 3.3–5)
ALBUMIN SERPL ELPH-MCNC: 3.2 G/DL — LOW (ref 3.3–5)
ALP SERPL-CCNC: 78 U/L — SIGNIFICANT CHANGE UP (ref 40–120)
ALP SERPL-CCNC: 80 U/L — SIGNIFICANT CHANGE UP (ref 40–120)
ALT FLD-CCNC: 57 U/L — HIGH (ref 10–45)
ALT FLD-CCNC: 61 U/L — HIGH (ref 10–45)
ANION GAP SERPL CALC-SCNC: 10 MMOL/L — SIGNIFICANT CHANGE UP (ref 5–17)
AST SERPL-CCNC: 66 U/L — HIGH (ref 10–40)
AST SERPL-CCNC: 73 U/L — HIGH (ref 10–40)
BASOPHILS # BLD AUTO: 0.01 K/UL — SIGNIFICANT CHANGE UP (ref 0–0.2)
BASOPHILS NFR BLD AUTO: 0.2 % — SIGNIFICANT CHANGE UP (ref 0–2)
BILIRUB DIRECT SERPL-MCNC: 0.2 MG/DL — SIGNIFICANT CHANGE UP (ref 0–0.2)
BILIRUB INDIRECT FLD-MCNC: 0.5 MG/DL — SIGNIFICANT CHANGE UP (ref 0.2–1)
BILIRUB SERPL-MCNC: 0.6 MG/DL — SIGNIFICANT CHANGE UP (ref 0.2–1.2)
BILIRUB SERPL-MCNC: 0.7 MG/DL — SIGNIFICANT CHANGE UP (ref 0.2–1.2)
BLD GP AB SCN SERPL QL: NEGATIVE — SIGNIFICANT CHANGE UP
BUN SERPL-MCNC: 22 MG/DL — SIGNIFICANT CHANGE UP (ref 7–23)
CALCIUM SERPL-MCNC: 9 MG/DL — SIGNIFICANT CHANGE UP (ref 8.4–10.5)
CHLORIDE SERPL-SCNC: 109 MMOL/L — HIGH (ref 96–108)
CO2 SERPL-SCNC: 17 MMOL/L — LOW (ref 22–31)
CREAT SERPL-MCNC: 0.79 MG/DL — SIGNIFICANT CHANGE UP (ref 0.5–1.3)
EOSINOPHIL # BLD AUTO: 0 K/UL — SIGNIFICANT CHANGE UP (ref 0–0.5)
EOSINOPHIL NFR BLD AUTO: 0 % — SIGNIFICANT CHANGE UP (ref 0–6)
GLUCOSE SERPL-MCNC: 116 MG/DL — HIGH (ref 70–99)
HCT VFR BLD CALC: 40.3 % — SIGNIFICANT CHANGE UP (ref 34.5–45)
HGB BLD-MCNC: 13.3 G/DL — SIGNIFICANT CHANGE UP (ref 11.5–15.5)
IMM GRANULOCYTES NFR BLD AUTO: 0.9 % — SIGNIFICANT CHANGE UP (ref 0–1.5)
LYMPHOCYTES # BLD AUTO: 0.83 K/UL — LOW (ref 1–3.3)
LYMPHOCYTES # BLD AUTO: 15.2 % — SIGNIFICANT CHANGE UP (ref 13–44)
MAGNESIUM SERPL-MCNC: 2.2 MG/DL — SIGNIFICANT CHANGE UP (ref 1.6–2.6)
MCHC RBC-ENTMCNC: 30.9 PG — SIGNIFICANT CHANGE UP (ref 27–34)
MCHC RBC-ENTMCNC: 33 GM/DL — SIGNIFICANT CHANGE UP (ref 32–36)
MCV RBC AUTO: 93.7 FL — SIGNIFICANT CHANGE UP (ref 80–100)
MONOCYTES # BLD AUTO: 0.26 K/UL — SIGNIFICANT CHANGE UP (ref 0–0.9)
MONOCYTES NFR BLD AUTO: 4.8 % — SIGNIFICANT CHANGE UP (ref 2–14)
NEUTROPHILS # BLD AUTO: 4.3 K/UL — SIGNIFICANT CHANGE UP (ref 1.8–7.4)
NEUTROPHILS NFR BLD AUTO: 78.9 % — HIGH (ref 43–77)
NRBC # BLD: 0 /100 WBCS — SIGNIFICANT CHANGE UP (ref 0–0)
PHOSPHATE SERPL-MCNC: 2.7 MG/DL — SIGNIFICANT CHANGE UP (ref 2.5–4.5)
PLATELET # BLD AUTO: 3 K/UL — CRITICAL LOW (ref 150–400)
POTASSIUM SERPL-MCNC: 4.6 MMOL/L — SIGNIFICANT CHANGE UP (ref 3.5–5.3)
POTASSIUM SERPL-SCNC: 4.6 MMOL/L — SIGNIFICANT CHANGE UP (ref 3.5–5.3)
PROT SERPL-MCNC: 9.5 G/DL — HIGH (ref 6–8.3)
PROT SERPL-MCNC: 9.8 G/DL — HIGH (ref 6–8.3)
RBC # BLD: 4.3 M/UL — SIGNIFICANT CHANGE UP (ref 3.8–5.2)
RBC # FLD: 11.9 % — SIGNIFICANT CHANGE UP (ref 10.3–14.5)
RH IG SCN BLD-IMP: POSITIVE — SIGNIFICANT CHANGE UP
SODIUM SERPL-SCNC: 136 MMOL/L — SIGNIFICANT CHANGE UP (ref 135–145)
WBC # BLD: 5.45 K/UL — SIGNIFICANT CHANGE UP (ref 3.8–10.5)
WBC # FLD AUTO: 5.45 K/UL — SIGNIFICANT CHANGE UP (ref 3.8–10.5)

## 2021-07-19 PROCEDURE — 99233 SBSQ HOSP IP/OBS HIGH 50: CPT | Mod: GC

## 2021-07-19 RX ADMIN — Medication 137 MICROGRAM(S): at 07:54

## 2021-07-19 RX ADMIN — Medication 40 MILLIGRAM(S): at 11:07

## 2021-07-19 RX ADMIN — SERTRALINE 50 MILLIGRAM(S): 25 TABLET, FILM COATED ORAL at 11:07

## 2021-07-19 NOTE — PROGRESS NOTE ADULT - PROBLEM SELECTOR PLAN 1
First admission 6/23-6/25 for thrombocytopenia, probably ITP as diagnosis. Last admission, pt received 4 days of Decadron and 2 days IVIG, discharged with plt 67. Had prior workup for thrombocytopenia as follows: HIV and HCV negative. RF WNL, B12/Folate WNL. US abdomen did not show hepatosplenomegaly. H. pylori ab positive, was not able to follow up with rheum outpatient. Now back to ED for thrombocytopenia "plt 2" on outpatient heme/onc labs, plt count 1 in ED. Received 65mg IVIG on 7/16    -Heme/onc following   -platelet of 3 today  -will continue to monitor for signs of bleeding  -Monitor CBC  -Maintain active T + S, transfuse platelets if clinically important bleeding develops  -heme/onc now recommending rituximab. transfer to [facility]

## 2021-07-19 NOTE — PROGRESS NOTE ADULT - ASSESSMENT
62 yo F, [place], with PMHx of ITP (on treatment with steroids/IVIG) hypothyroidism, hyperlipidemia, anxiety/depression, macular degeneration, bilateral knee osteoarthritis, was sent to ED for low platelets. Patient has had bruising on her arms/legs and petechiae. Hematology consulted for h/o ITP refractory to steroids.     ITP, refractory to steroids and IVIG  Previously treated with IV dexamethasone 40mg QD x 4 days and IVIG x 2 doses. Had prior workup for thrombocytopenia as follows: HIV and HCV negative. RF WNL, B12/Folate WNL. Peripheral flow cytometry (06/23) did not show any circulating blasts or abnormal cell population. US Abdomen did not show hepatosplenomegaly. H. Pylori Abs positive, has not seen GI as outpatient yet. EIRN titer 1:80, seen by rheum  -s/p IVIG w/o improvement  - on D3 of dexamethasone 40 mg QD - no improvement  -transfer to [address] [place] [address] for Rituximab  -consent obtained, check updated hepatitis panel from 6/23 is negative  -Monitor CBC daily   -Monitor for signs and symptoms of bleeding   -Avoid platelet transfusion unless she is actively bleeding       Discussed with Dr. Mercado

## 2021-07-19 NOTE — PROGRESS NOTE ADULT - ASSESSMENT
62 y/o F, with a past medical history of hypothyroidism, HLD, depression, arthritis, ITP (new diagnosis, initially admitted 6/23) sent to ED by heme/onc outpt for plt count "2", found to be thrombocytopenic, admitted now for further management of severe thrombocytopenia 2/2 to refractory ITP. Condition refractory to IVIG and steroids. Transfer to [facility] for rituximab.

## 2021-07-19 NOTE — PROGRESS NOTE ADULT - SUBJECTIVE AND OBJECTIVE BOX
INTERVAL HPI/OVERNIGHT EVENTS:  Pt was seen and examined at the bedside. She was observed to be lying down comfortably.   Pt offers no complaints.      VITAL SIGNS:  T(F): 98 (07-19-21 @ 08:57)  HR: 63 (07-19-21 @ 08:57)  BP: 131/94 (07-19-21 @ 08:57)  RR: 19 (07-19-21 @ 08:57)  SpO2: 95% (07-19-21 @ 08:57)  Wt(kg): --  I&O's Summary    PHYSICAL EXAM:  Constitutional: NAD, well-groomed, well-developed  HEENT: PERRLA, EOMI, Normal Hearing, MMM  Neck: No LAD, No JVD  Back: Normal spine flexure, No CVA tenderness  Respiratory: CTAB  Cardiovascular: S1 and S2, RRR, no M/G/R  Gastrointestinal: BS+, soft, NT/ND  Extremities: No peripheral edema  Vascular: 2+ peripheral pulses  Neurological: A/O x 3, no focal deficits  Psychiatric: Normal mood, normal affect  Musculoskeletal: 5/5 strength b/l upper and lower extremities  Skin: Bruises around extremities        MEDICATIONS  (STANDING):  dexAMETHasone     Tablet 40 milliGRAM(s) Oral every 24 hours  levothyroxine 137 MICROGram(s) Oral <User Schedule>  levothyroxine 125 MICROGram(s) Oral <User Schedule>  sertraline 50 milliGRAM(s) Oral daily    MEDICATIONS  (PRN):      Allergies    azithromycin (Rash)  penicillin (Rash)  vancomycin (Rash)    Intolerances        LABS:  CBC Full  -  ( 19 Jul 2021 07:59 )  WBC Count : 5.45 K/uL  RBC Count : 4.30 M/uL  Hemoglobin : 13.3 g/dL  Hematocrit : 40.3 %  Platelet Count - Automated : 3 K/uL  Mean Cell Volume : 93.7 fl  Mean Cell Hemoglobin : 30.9 pg  Mean Cell Hemoglobin Concentration : 33.0 gm/dL  Auto Neutrophil # : 4.30 K/uL  Auto Lymphocyte # : 0.83 K/uL  Auto Monocyte # : 0.26 K/uL  Auto Eosinophil # : 0.00 K/uL  Auto Basophil # : 0.01 K/uL  Auto Neutrophil % : 78.9 %  Auto Lymphocyte % : 15.2 %  Auto Monocyte % : 4.8 %  Auto Eosinophil % : 0.0 %  Auto Basophil % : 0.2 %    07-19    136  |  109<H>  |  22  ----------------------------<  116<H>  4.6   |  17<L>  |  0.79    Ca    9.0      19 Jul 2021 07:58  Phos  2.7     07-19  Mg     2.2     07-19    TPro  9.5<H>  /  Alb  3.1<L>  /  TBili  0.6  /  DBili  x   /  AST  73<H>  /  ALT  61<H>  /  AlkPhos  80  07-19                RADIOLOGY & ADDITIONAL TESTS: Reviewed.

## 2021-07-19 NOTE — PROGRESS NOTE ADULT - SUBJECTIVE AND OBJECTIVE BOX
CC: Patient is a 61y old  Female who presents with a chief complaint of thrombocytopenia 2/2 ITP (18 Jul 2021 13:47)      INTERVAL EVENTS: GER[place]IA    SUBJECTIVE / INTERVAL HPI: Patient seen and examined at bedside. Pt has worsening bruising today. Pt denies fevers, chills, chest pain, shortness of breath, cough, abdominal pain, nausea, vomiting, diarrhea, constipation, leg pain/swelling.     ROS: negative unless otherwise stated above.    VITAL SIGNS:  Vital Signs Last 24 Hrs  T(C): 36.7 (19 Jul 2021 08:57), Max: 36.7 (18 Jul 2021 15:55)  T(F): 98 (19 Jul 2021 08:57), Max: 98.1 (18 Jul 2021 15:55)  HR: 63 (19 Jul 2021 08:57) (63 - 89)  BP: 131/94 (19 Jul 2021 08:57) (107/76 - 131/94)  BP(mean): --  RR: 19 (19 Jul 2021 08:57) (18 - 19)  SpO2: 95% (19 Jul 2021 08:57) (95% - 97%)        PHYSICAL EXAM:    General: NAD  HEENT: MMM, ulcer on roof of mouth improving  Neck: supple  Cardiovascular: +S1/S2; RRR  Respiratory: CTA B/L; no W/R/R  Gastrointestinal: soft, NT/ND  Extremities: WWP; no edema, clubbing. Ecchymosis on both UE. Petechiae on lower extremieies   Vascular: 2+ radial, DP/PT pulses B/L  Neurological: AAOx3; no focal deficits    MEDICATIONS:  MEDICATIONS  (STANDING):  dexAMETHasone     Tablet 40 milliGRAM(s) Oral every 24 hours  levothyroxine 137 MICROGram(s) Oral <User Schedule>  levothyroxine 125 MICROGram(s) Oral <User Schedule>  sertraline 50 milliGRAM(s) Oral daily    MEDICATIONS  (PRN):      ALLERGIES:  Allergies    azithromycin (Rash)  penicillin (Rash)  vancomycin (Rash)    Intolerances        LABS:                        13.3   5.45  )-----------( 3        ( 19 Jul 2021 07:59 )             40.3     07-19    136  |  109<H>  |  22  ----------------------------<  116<H>  4.6   |  17<L>  |  0.79    Ca    9.0      19 Jul 2021 07:58  Phos  2.7     07-19  Mg     2.2     07-19    TPro  9.5<H>  /  Alb  3.1<L>  /  TBili  0.6  /  DBili  x   /  AST  73<H>  /  ALT  61<H>  /  AlkPhos  80  07-19        CAPILLARY BLOOD GLUCOSE          RADIOLOGY & ADDITIONAL TESTS: Reviewed. CC: Patient is a 61y old  Female who presents with a chief complaint of thrombocytopenia 2/2 ITP (18 Jul 2021 13:47)    HOSPITAL COURSE:   61F PMH hypothyroidism, HLD, depression, arthritis, ITP (new diagnosis, initially admitted 6/23, seen in ED 7/4) sent to ED by Dr. Pak (heme/onc) for platelet count of 2 on outpatient labs. Patient is presenting with upper extremity ecchymoses, petechiae in b/l upper and lower extremities.  She was recently diagnosed with ITP in June 2021 with platelet count of 2 and got 4 days of Decadron 40mg daily and 2 rounds of IVIG as an inpatient and was discharged with a follow up with Dr. Pak. Last week she presented to the ED for platelets <1 and got 4 days of IV Decadron 40mg and 2 days of IVIG 65mg. Platelet count progressively increased to 67 and she was discharged without steroids. On this admission, she was started on IVIG .65g x 2 days. Her platelet count did not respond to IVIG this admission. Started on decadron 40mg PO, now on day 2/4 of steroids without response in platelet count. Heme/onc following and now recommending transfer to [facility] for rituximab treatment for refractory ITP. Pt is stable for transfer.    INTERVAL EVENTS: GERMANIA    SUBJECTIVE / INTERVAL HPI: Patient seen and examined at bedside. Pt has worsening bruising today. Pt denies fevers, chills, chest pain, shortness of breath, cough, abdominal pain, nausea, vomiting, diarrhea, constipation, leg pain/swelling.     ROS: negative unless otherwise stated above.    VITAL SIGNS:  Vital Signs Last 24 Hrs  T(C): 36.7 (19 Jul 2021 08:57), Max: 36.7 (18 Jul 2021 15:55)  T(F): 98 (19 Jul 2021 08:57), Max: 98.1 (18 Jul 2021 15:55)  HR: 63 (19 Jul 2021 08:57) (63 - 89)  BP: 131/94 (19 Jul 2021 08:57) (107/76 - 131/94)  BP(mean): --  RR: 19 (19 Jul 2021 08:57) (18 - 19)  SpO2: 95% (19 Jul 2021 08:57) (95% - 97%)        PHYSICAL EXAM:    General: NAD  HEENT: MMM, ulcer on roof of mouth improving  Neck: supple  Cardiovascular: +S1/S2; RRR  Respiratory: CTA B/L; no W/R/R  Gastrointestinal: soft, NT/ND  Extremities: WWP; no edema, clubbing. Ecchymosis on both UE. Petechiae on lower extremieies   Vascular: 2+ radial, DP/PT pulses B/L  Neurological: AAOx3; no focal deficits    MEDICATIONS:  MEDICATIONS  (STANDING):  dexAMETHasone     Tablet 40 milliGRAM(s) Oral every 24 hours  levothyroxine 137 MICROGram(s) Oral <User Schedule>  levothyroxine 125 MICROGram(s) Oral <User Schedule>  sertraline 50 milliGRAM(s) Oral daily    MEDICATIONS  (PRN):      ALLERGIES:  Allergies    azithromycin (Rash)  penicillin (Rash)  vancomycin (Rash)    Intolerances        LABS:                        13.3   5.45  )-----------( 3        ( 19 Jul 2021 07:59 )             40.3     07-19    136  |  109<H>  |  22  ----------------------------<  116<H>  4.6   |  17<L>  |  0.79    Ca    9.0      19 Jul 2021 07:58  Phos  2.7     07-19  Mg     2.2     07-19    TPro  9.5<H>  /  Alb  3.1<L>  /  TBili  0.6  /  DBili  x   /  AST  73<H>  /  ALT  61<H>  /  AlkPhos  80  07-19        CAPILLARY BLOOD GLUCOSE          RADIOLOGY & ADDITIONAL TESTS: Reviewed.

## 2021-07-19 NOTE — PROGRESS NOTE ADULT - PROBLEM SELECTOR PLAN 2
Initially admitted 6/23-6/25 for thrombocytopenia, ITP as new diagnosis.  -See above for plan  -rituximab as per heme/onc

## 2021-07-20 ENCOUNTER — TRANSCRIPTION ENCOUNTER (OUTPATIENT)
Age: 61
End: 2021-07-20

## 2021-07-20 DIAGNOSIS — Z86.2 PERSONAL HISTORY OF DISEASES OF THE BLOOD AND BLOOD-FORMING ORGANS AND CERTAIN DISORDERS INVOLVING THE IMMUNE MECHANISM: ICD-10-CM

## 2021-07-20 LAB
ALBUMIN SERPL ELPH-MCNC: 3 G/DL — LOW (ref 3.3–5)
ALP SERPL-CCNC: 68 U/L — SIGNIFICANT CHANGE UP (ref 40–120)
ALT FLD-CCNC: 44 U/L — SIGNIFICANT CHANGE UP (ref 10–45)
ANION GAP SERPL CALC-SCNC: 6 MMOL/L — SIGNIFICANT CHANGE UP (ref 5–17)
AST SERPL-CCNC: 46 U/L — HIGH (ref 10–40)
BASOPHILS # BLD AUTO: 0.01 K/UL — SIGNIFICANT CHANGE UP (ref 0–0.2)
BASOPHILS NFR BLD AUTO: 0.1 % — SIGNIFICANT CHANGE UP (ref 0–2)
BILIRUB SERPL-MCNC: 0.4 MG/DL — SIGNIFICANT CHANGE UP (ref 0.2–1.2)
BUN SERPL-MCNC: 28 MG/DL — HIGH (ref 7–23)
CALCIUM SERPL-MCNC: 8.8 MG/DL — SIGNIFICANT CHANGE UP (ref 8.4–10.5)
CHLORIDE SERPL-SCNC: 108 MMOL/L — SIGNIFICANT CHANGE UP (ref 96–108)
CO2 SERPL-SCNC: 20 MMOL/L — LOW (ref 22–31)
CREAT SERPL-MCNC: 0.86 MG/DL — SIGNIFICANT CHANGE UP (ref 0.5–1.3)
EOSINOPHIL # BLD AUTO: 0 K/UL — SIGNIFICANT CHANGE UP (ref 0–0.5)
EOSINOPHIL NFR BLD AUTO: 0 % — SIGNIFICANT CHANGE UP (ref 0–6)
GLUCOSE SERPL-MCNC: 120 MG/DL — HIGH (ref 70–99)
HCT VFR BLD CALC: 37 % — SIGNIFICANT CHANGE UP (ref 34.5–45)
HGB BLD-MCNC: 12.4 G/DL — SIGNIFICANT CHANGE UP (ref 11.5–15.5)
IMM GRANULOCYTES NFR BLD AUTO: 0.8 % — SIGNIFICANT CHANGE UP (ref 0–1.5)
LYMPHOCYTES # BLD AUTO: 1.23 K/UL — SIGNIFICANT CHANGE UP (ref 1–3.3)
LYMPHOCYTES # BLD AUTO: 15.4 % — SIGNIFICANT CHANGE UP (ref 13–44)
MAGNESIUM SERPL-MCNC: 2.2 MG/DL — SIGNIFICANT CHANGE UP (ref 1.6–2.6)
MCHC RBC-ENTMCNC: 31.6 PG — SIGNIFICANT CHANGE UP (ref 27–34)
MCHC RBC-ENTMCNC: 33.5 GM/DL — SIGNIFICANT CHANGE UP (ref 32–36)
MCV RBC AUTO: 94.4 FL — SIGNIFICANT CHANGE UP (ref 80–100)
MONOCYTES # BLD AUTO: 0.39 K/UL — SIGNIFICANT CHANGE UP (ref 0–0.9)
MONOCYTES NFR BLD AUTO: 4.9 % — SIGNIFICANT CHANGE UP (ref 2–14)
NEUTROPHILS # BLD AUTO: 6.29 K/UL — SIGNIFICANT CHANGE UP (ref 1.8–7.4)
NEUTROPHILS NFR BLD AUTO: 78.8 % — HIGH (ref 43–77)
NRBC # BLD: 0 /100 WBCS — SIGNIFICANT CHANGE UP (ref 0–0)
PHOSPHATE SERPL-MCNC: 3.1 MG/DL — SIGNIFICANT CHANGE UP (ref 2.5–4.5)
PLATELET # BLD AUTO: 10 K/UL — CRITICAL LOW (ref 150–400)
POTASSIUM SERPL-MCNC: 4.4 MMOL/L — SIGNIFICANT CHANGE UP (ref 3.5–5.3)
POTASSIUM SERPL-SCNC: 4.4 MMOL/L — SIGNIFICANT CHANGE UP (ref 3.5–5.3)
PROT SERPL-MCNC: 8.7 G/DL — HIGH (ref 6–8.3)
RBC # BLD: 3.92 M/UL — SIGNIFICANT CHANGE UP (ref 3.8–5.2)
RBC # FLD: 12 % — SIGNIFICANT CHANGE UP (ref 10.3–14.5)
SODIUM SERPL-SCNC: 134 MMOL/L — LOW (ref 135–145)
WBC # BLD: 7.98 K/UL — SIGNIFICANT CHANGE UP (ref 3.8–10.5)
WBC # FLD AUTO: 7.98 K/UL — SIGNIFICANT CHANGE UP (ref 3.8–10.5)

## 2021-07-20 PROCEDURE — 99233 SBSQ HOSP IP/OBS HIGH 50: CPT | Mod: GC

## 2021-07-20 RX ORDER — ACETAMINOPHEN 500 MG
650 TABLET ORAL ONCE
Refills: 0 | Status: COMPLETED | OUTPATIENT
Start: 2021-07-20 | End: 2021-07-20

## 2021-07-20 RX ORDER — RITUXIMAB 10 MG/ML
700 INJECTION, SOLUTION INTRAVENOUS ONCE
Refills: 0 | Status: COMPLETED | OUTPATIENT
Start: 2021-07-20 | End: 2021-07-20

## 2021-07-20 RX ORDER — DIPHENHYDRAMINE HCL 50 MG
50 CAPSULE ORAL ONCE
Refills: 0 | Status: COMPLETED | OUTPATIENT
Start: 2021-07-20 | End: 2021-07-20

## 2021-07-20 RX ADMIN — Medication 650 MILLIGRAM(S): at 12:13

## 2021-07-20 RX ADMIN — SERTRALINE 50 MILLIGRAM(S): 25 TABLET, FILM COATED ORAL at 12:13

## 2021-07-20 RX ADMIN — Medication 50 MILLIGRAM(S): at 12:12

## 2021-07-20 RX ADMIN — Medication 137 MICROGRAM(S): at 07:24

## 2021-07-20 RX ADMIN — Medication 40 MILLIGRAM(S): at 11:24

## 2021-07-20 RX ADMIN — Medication 208 MILLIGRAM(S): at 12:13

## 2021-07-20 RX ADMIN — Medication 650 MILLIGRAM(S): at 12:45

## 2021-07-20 RX ADMIN — RITUXIMAB 175 MILLIGRAM(S): 10 INJECTION, SOLUTION INTRAVENOUS at 12:56

## 2021-07-20 NOTE — DISCHARGE NOTE PROVIDER - PROVIDER TOKENS
PROVIDER:[[id number]],ESTABLISHEDPATIENT:[T]] PROVIDER:[[id number]],SCHEDULEDAPPT:[08/02/2021],SCHEDULEDAPPTTIME:[01:00 PM],ESTABLISHEDPATIENT:[T]] PROVIDER:[[id number]],SCHEDULEDAPPT:[08/02/2021],SCHEDULEDAPPTTIME:[01:00 PM],ESTABLISHEDPATIENT:[T]],PROVIDER:[[id number]],SCHEDULEDAPPT:[08/02/2021],SCHEDULEDAPPTTIME:[03:00 PM],ESTABLISHEDPATIENT:[T]] PROVIDER:[[id number]],SCHEDULEDAPPT:[08/02/2021],SCHEDULEDAPPTTIME:[01:00 PM],ESTABLISHEDPATIENT:[T]],PROVIDER:[[id number]],SCHEDULEDAPPT:[08/02/2021],SCHEDULEDAPPTTIME:[03:00 PM],ESTABLISHEDPATIENT:[T]],PROVIDER:[[id number]],SCHEDULEDAPPT:[08/26/2021],SCHEDULEDAPPTTIME:[12:00 PM]]

## 2021-07-20 NOTE — PROGRESS NOTE ADULT - PROBLEM SELECTOR PLAN 1
First admission 6/23-6/25 for thrombocytopenia, probably ITP as diagnosis. Last admission, pt received 4 days of Decadron and 2 days IVIG, discharged with plt 67. Had prior workup for thrombocytopenia as follows: HIV and HCV negative. RF WNL, B12/Folate WNL. US abdomen did not show hepatosplenomegaly. H. pylori ab positive, was not able to follow up with rheum outpatient. Now back to ED for thrombocytopenia "plt 2" on outpatient heme/onc labs, plt count 1 in ED. Received 65mg IVIG on 7/16    -Heme/onc following   -platelet of 3 today  -will continue to monitor for signs of bleeding  -Monitor CBC  -Maintain active T + S, transfuse platelets if clinically important bleeding develops  -heme/onc now recommending rituximab. transfer to [facility] First admission 6/23-6/25 for thrombocytopenia, probably ITP as diagnosis. Last admission, pt received 4 days of Decadron and 2 days IVIG, discharged with plt 67. Had prior workup for thrombocytopenia as follows: HIV and HCV negative. RF WNL, B12/Folate WNL. US abdomen no hepatosplenomegaly. H. pylori ab positive. Now back to ED for thrombocytopenia "plt 2" on outpatient heme/onc labs, plt count 1 in ED count increasing. Received 65mg IVIG on 7/15 and 7/16.    -Heme/onc following-started rituzimab 700mg infused over 4hrs (1 dose)  -platelet of 10 today  -C/W decadron day 3/5  -will continue to monitor for signs of bleeding  -Monitor CBC  -Maintain active T + S, transfuse platelets if clinically important bleeding develops  -Make f/u appointment with Dr. Fulton- outpatient heme

## 2021-07-20 NOTE — PROGRESS NOTE ADULT - ASSESSMENT
60 y/o F, with a past medical history of hypothyroidism, HLD, depression, arthritis, ITP (new diagnosis, initially admitted 6/23) sent to ED by heme/onc outpt for plt count "2", found to be thrombocytopenic, admitted now for further management of severe thrombocytopenia 2/2 to refractory ITP. Condition refractory to IVIG and steroids. Transfer to [facility] for rituximab.  62 y/o F, with a past medical history of hypothyroidism, HLD, depression, arthritis, ITP (new diagnosis, initially admitted 6/23) sent to ED by heme/onc outpt for plt count "2", found to be thrombocytopenic, admitted now for further management of severe thrombocytopenia 2/2 to refractory ITP. Condition refractory to IVIG and steroids. Starting Rituximab 1 dose today infused over 4 hrs.

## 2021-07-20 NOTE — PROGRESS NOTE ADULT - PROBLEM SELECTOR PLAN 2
Initially admitted 6/23-6/25 for thrombocytopenia, ITP as new diagnosis.  -See above for plan  -rituximab as per heme/onc Initially admitted 6/23-6/25 for thrombocytopenia, ITP as new diagnosis.  -See above for plan  -Rituximab 700mg over 4hrs 1 dose (7/20) as per heme/onc

## 2021-07-20 NOTE — DISCHARGE NOTE PROVIDER - NSDCCPCAREPLAN_GEN_ALL_CORE_FT
PRINCIPAL DISCHARGE DIAGNOSIS  Diagnosis: Thrombocytopenia  Assessment and Plan of Treatment:       SECONDARY DISCHARGE DIAGNOSES  Diagnosis: History of ITP  Assessment and Plan of Treatment:      PRINCIPAL DISCHARGE DIAGNOSIS  Diagnosis: Thrombocytopenia  Assessment and Plan of Treatment: You were diagnosed with thrombocytopenia on this visit and have a previous diagnosis of thrombocytopenia. We treated you with IGIV, steriods, and Rituximaub. You are at high risk of bleeding as you have very few platelets. If you notice any signs of bleeding such as bruising, bloodshot eyes, or headache please go to the emergency department at once for further evaluation.      SECONDARY DISCHARGE DIAGNOSES  Diagnosis: Arthritis  Assessment and Plan of Treatment: You have a previous diagnosis of arthritis. Please follow up with your outpatient rheumatologist for further management of your condition.     PRINCIPAL DISCHARGE DIAGNOSIS  Diagnosis: Thrombocytopenia  Assessment and Plan of Treatment: You were diagnosed with thrombocytopenia on this visit and have a previous diagnosis of thrombocytopenia. We treated you with IGIV, steriods, and Rituximaub. You are at high risk of bleeding as you have very few platelets. If you notice any signs of bleeding such as bruising, bloodshot eyes, or headache please go to the emergency department at once for further evaluation.      SECONDARY DISCHARGE DIAGNOSES  Diagnosis: Arthritis  Assessment and Plan of Treatment: You have a previous diagnosis of arthritis. You may have pain secondary to your arthritis. If you have increased diability, pain, unsteady gait or falls please follow up with your outpatient rheumatologist for further management of your condition.     PRINCIPAL DISCHARGE DIAGNOSIS  Diagnosis: Thrombocytopenia  Assessment and Plan of Treatment: You were diagnosed with thrombocytopenia on this visit and have a previous diagnosis of thrombocytopenia. We treated you with IGIV, steriods, and Rituximaub. You are at high risk of bleeding as you have very few platelets. If you notice any signs of bleeding such as bruising, bloodshot eyes, or headache please go to the emergency department at once for further evaluation. Please hold off on taking your Ezetimibe as studies have show association with ITP. Please follow up with your hemetologist for further workup.      SECONDARY DISCHARGE DIAGNOSES  Diagnosis: Arthritis  Assessment and Plan of Treatment: You have a previous diagnosis of arthritis. You may have pain secondary to your arthritis. If you have increased diability, pain, unsteady gait or falls please follow up with your outpatient rheumatologist for further management of your condition and restarting your home [place].

## 2021-07-20 NOTE — DISCHARGE NOTE PROVIDER - NSDCFUSCHEDAPPT_GEN_ALL_CORE_FT
DREW ZAMORA ; 08/30/2021 ; [facility] [address] DREW ZAMORA ; 08/26/2021 ; [facility] [address]  DREW ZAMORA ; 08/30/2021 ; [facility] [address]

## 2021-07-20 NOTE — DISCHARGE NOTE PROVIDER - NSDCFUADDAPPT_GEN_ALL_CORE_FT
Please bring your Insurance card, Photo ID and Discharge paperwork to the following appointment:    (1) Please follow up with your Hematology/Oncology Provider, Dr. Jt Fulton at [address] [place] [address] on 08/02/2021 at 1:00pm.    Appointment was scheduled by Ms. NICOLE Kim, Referral Coordinator.   Please bring your Insurance card, Photo ID and Discharge paperwork to the following appointments:    (1) Please follow up with your Hematology/Oncology Provider, Dr. Jt Fulton at [address], [address], [place] [address] on 08/02/2021 at 1:00pm.    Appointment was scheduled by Ms. NICOLE Kim, Referral Coordinator.    (2) Please follow up with your Internal Medicine Provider, Dr. Vitor Wang on behalf of Dr. Karli Rodriguez at [address], [place] [address] on 08/26/2021 at 12:00pm.    Appointment was scheduled by Ms. NICOLE Kim, Referral Coordinator.

## 2021-07-20 NOTE — PROGRESS NOTE ADULT - PROBLEM SELECTOR PLAN 4
Known hypothyroidism. Takes levothyroxine 137mcg 5 times a week and 125mcg 2 times a week (weekends) at home.   -C/w home levothyroxine regimen Known hypothyroidism. Takes levothyroxine 137mcg 5 times a week and 125mcg 2 times a week (weekends) at home.   -C/w home levothyroxine 125mg regimen

## 2021-07-20 NOTE — DISCHARGE NOTE PROVIDER - CARE PROVIDER_API CALL
Jt Fulton)  Hematology; Medical Oncology  [address], [address]  [place] [address]  Phone: [phone]  Fax: [phone]  Established Patient  Follow Up Time:    Jt Fulton)  Hematology; Medical Oncology  [address], [address]  [place] [address]  Phone: [phone]  Fax: [phone]  Established Patient  Scheduled Appointment: 08/02/2021 01:00 PM   Jt Fulton)  Hematology; Medical Oncology  [address], [address]  [place] [address]  Phone: [phone]  Fax: [phone]  Established Patient  Scheduled Appointment: 08/02/2021 01:00 PM    Sudhakar Daly  INTERNAL MEDICINE  [address] [place] [address]  Phone: [phone]  Fax: [phone]  Established Patient  Scheduled Appointment: 08/02/2021 03:00 PM   Jt Fulton)  Hematology; Medical Oncology  [address], [address]  [place] [address]  Phone: [phone]  Fax: [phone]  Established Patient  Scheduled Appointment: 08/02/2021 01:00 PM    Sudhakar Daly  INTERNAL MEDICINE  [address] [place] [address]  Phone: [phone]  Fax: [phone]  Established Patient  Scheduled Appointment: 08/02/2021 03:00 PM    Karli Rodriguez)  Internal Medicine  [address] [place] [address]  Phone: [phone]  Fax: [phone]  Scheduled Appointment: 08/26/2021 12:00 PM

## 2021-07-20 NOTE — PROGRESS NOTE ADULT - PROBLEM SELECTOR PLAN 5
Known depression. Takes sertraline 50mg qD at home.   -C/w home meds Known depression. Takes sertraline 50mg qD at home.   -C/w home Zoloft 50mg Q24

## 2021-07-20 NOTE — DISCHARGE NOTE PROVIDER - HOSPITAL COURSE
#Discharge: do not delete    Patient is __ yo M/F with past medical history of _____ presented with _____, found to have _____ (one liner)    Hospital course (by problem):     Patient was discharged to: (home/[facility]/acute rehab/hospice, etc, and with what services – home health PT/RN? Home O2?)    New medications:   Changes to old medications:  Medications that were stopped:    Items to follow up as outpatient:    Physical exam at the time of discharge:       #Discharge: do not delete    62 y/o F, with a past medical history of hypothyroidism, HLD, depression, arthritis, ITP (new diagnosis, initially admitted 6/23) sent to ED by heme/onc out patient for platelet count "2", found to be thrombocytopenic, admitted now for further management of severe thrombocytopenia 2/2 to refractory ITP. Condition refractory to IVIG and steroids. One dose of Rituximab infused over 4 hrs on 7/20.     Hospital course (by problem):     #Thrombocytopenia: Patient has a medical history of thrombocytopenia. Her first admission on 6/23 was for thrombocytopenia, ITP as probable diagnosis. Last admission, pt received 4 days of Decadron and 2 days IVIG, discharged with platelets of 67. Her prior workup for thrombocytopenia is as follows: HIV and HCV negative, RF WNL, B12/Folate WNL, US abdomen no hepatosplenomegaly, H. pylori ab positive. She returned to the ED for thrombocytopenia with a platelet count of 2 on outpatient heme/onc labs (7/15), her platelet count was 1 in the ED. She received two doses of 65mg IVIG on 7/15 and 7/16. ITP was refractory to IVIG so she was started on Decadron. She was started on Rituzimab 700mg infused over 4hrs (1 dose), we continued to monitor for signs of bleeding. She will follow up with Dr. Fulton outpatient.     #Arthritis: Patient has a know history of chronic joint stiffness of entire body including back and knees. She follows were her outpatient rheumatologist Dr. Daly for arthritis in setting of thrombocytopenia. Her pertinent labs are as follows: ERIN 1:80 on 6/24, Anti-dsDNA, anti-RNP, anti-Smith, rheumatoid factor negative 6/24. We held the patients home colchicine as there are cases reporting thrombocytopenia.     #Hypothyroidism: Patient has a known history of hypothyroidism. She takes levothyroxine 137mcg 5 times a week and 125mcg 2 times a week (weekends) at home. We continued her home Levothyroxine regimen.      #Depression: Patient has a known medical history of depression. We continued her home medication of Zoloft 50mg Q24.     #HLD (hyperlipidemia): Patient has a know medical history of HLD.  She takes Ezetimibe 10mg qD at home. We held the therapeutic interchange with statins as patient does not tolerate statins.    Patient was discharged to: (home/[facility]/acute rehab/hospice, etc, and with what services – home health PT/RN? Home O2?)    New medications:   -    Changes to old medications:  None    Medications that were stopped:  None    Items to follow up as outpatient:  CBC, CMP, PT, PTT, INR     Physical exam at the time of discharge:  PHYSICAL EXAM:  General: NAD  HEENT: MMM, ulcer on roof of mouth improving  Neck: supple  Cardiovascular: +S1/S2; RRR  Respiratory: CTA B/L; no W/R/R  Gastrointestinal: soft, NT/ND  Extremities: WWP; no edema, clubbing. Diffuse purpura on both UE. Petechiae and 2 purpura on abdomen, purpura and ecchymosis on lower extremities, pretibial nodule right LE.  Vascular: 2+ radial, DP/PT pulses B/L  Neurological: AAOx3; no focal deficits       #Discharge: do not delete    62 y/o F, with a past medical history of hypothyroidism, HLD, depression, arthritis, ITP (new diagnosis, initially admitted 6/23) sent to ED by heme/onc out patient for platelet count "2", found to be thrombocytopenic, admitted now for further management of severe thrombocytopenia 2/2 to refractory ITP. Condition refractory to IVIG and steroids. One dose of Rituximab infused over 4 hrs on 7/20.     Hospital course (by problem):     #Thrombocytopenia: Patient has a medical history of thrombocytopenia. Her first admission on 6/23 was for thrombocytopenia, ITP as probable diagnosis. Last admission, pt received 4 days of Decadron and 2 days IVIG, discharged with platelets of 67. Her prior workup for thrombocytopenia is as follows: HIV and HCV negative, RF WNL, B12/Folate WNL, US abdomen no hepatosplenomegaly, H. pylori ab positive. She returned to the ED for thrombocytopenia with a platelet count of 2 on outpatient heme/onc labs (7/15), her platelet count was 1 in the ED. She received two doses of 65mg IVIG on 7/15 and 7/16. ITP was refractory to IVIG so she was started on Decadron. She finished 1 dose of Rituzimab 700mg infused over 4hrs, we continued to monitor for signs of bleeding. She will follow up with Dr. Fulton outpatient.     #Arthritis: Patient has a know history of chronic joint stiffness of entire body including back and knees. She follows were her outpatient rheumatologist Dr. Daly for arthritis in setting of thrombocytopenia. Her pertinent labs are as follows: ERIN 1:80 on 6/24, Anti-dsDNA, anti-RNP, anti-Smith, rheumatoid factor negative 6/24. We held the patients home colchicine as there are cases reporting thrombocytopenia. She will follow up with Dr. Daly.     #Hypothyroidism: Patient has a known history of hypothyroidism. She takes levothyroxine 137mcg 5 times a week and 125mcg 2 times a week (weekends) at home. We continued her home Levothyroxine regimen.      #Depression: Patient has a known medical history of depression. We continued her home medication of Zoloft 50mg Q24.     #HLD (hyperlipidemia): Patient has a know medical history of HLD.  She takes Ezetimibe 10mg qD at home. We held the therapeutic interchange with statins as patient does not tolerate statins.    Patient was discharged to: Home    New medications:   -    Changes to old medications:  None    Medications that were stopped:  None    Items to follow up as outpatient:  CBC, CMP, PT, PTT, INR     Physical exam at the time of discharge:  PHYSICAL EXAM:  General: NAD  HEENT: MMM, ulcer on roof of mouth improving  Neck: supple  Cardiovascular: +S1/S2; RRR  Respiratory: CTA B/L; no W/R/R  Gastrointestinal: soft, NT/ND  Extremities: WWP; no edema, clubbing. Diffuse purpura on both UE. Petechiae and 2 purpura on abdomen, purpura and ecchymosis on lower extremities, pretibial nodule right LE.  Vascular: 2+ radial, DP/PT pulses B/L  Neurological: AAOx3; no focal deficits       #Discharge: do not delete    60 y/o F, with a past medical history of hypothyroidism, HLD, depression, arthritis, ITP (new diagnosis, initially admitted 6/23) sent to ED by heme/onc out patient for platelet count "2", found to be thrombocytopenic, admitted now for further management of severe thrombocytopenia 2/2 to refractory ITP. Condition refractory to IVIG and steroids. One dose of Rituximab infused over 4 hrs on 7/20.     Hospital course (by problem):     #Thrombocytopenia: Patient has a medical history of thrombocytopenia. Her first admission on 6/23 was for thrombocytopenia, ITP as probable diagnosis. Last admission, pt received 4 days of Decadron and 2 days IVIG, discharged with platelets of 67. Her prior workup for thrombocytopenia is as follows: HIV and HCV negative, RF WNL, B12/Folate WNL, US abdomen no hepatosplenomegaly, H. pylori ab positive. She returned to the ED for thrombocytopenia with a platelet count of 2 on outpatient heme/onc labs (7/15), her platelet count was 1 in the ED. She received two doses of 65mg IVIG on 7/15 and 7/16. ITP was refractory to IVIG so she was started on Decadron. She finished 1 dose of Rituzimab 700mg infused over 4hrs, we continued to monitor for signs of bleeding. She will follow up with Dr. Fulton outpatient.     #Arthritis: Patient has a know history of chronic joint stiffness of entire body including back and knees. She follows were her outpatient rheumatologist Dr. Daly for arthritis in setting of thrombocytopenia. Her pertinent labs are as follows: ERIN 1:80 on 6/24, Anti-dsDNA, anti-RNP, anti-Smith, rheumatoid factor negative 6/24. We held the patients home colchicine as there are cases reporting thrombocytopenia. She will follow up with Dr. Daly.     #Hypothyroidism: Patient has a known history of hypothyroidism. She takes levothyroxine 137mcg 5 times a week and 125mcg 2 times a week (weekends) at home. We continued her home Levothyroxine regimen.      #Depression: Patient has a known medical history of depression. We continued her home medication of Zoloft 50mg Q24.     #HLD (hyperlipidemia): Patient has a know medical history of HLD.  She takes Ezetimibe 10mg qD at home. We held the therapeutic interchange with statins as patient does not tolerate statins.    Patient was discharged to: Home    New medications:   -None    Changes to old medications:  Please hold off on taking colchicine and Ezetimibe as there is research showing these medications can be associated with ITP.     Medications that were stopped:  None    Items to follow up as outpatient:  CBC, CMP, PT, PTT, INR  Please follow up with your PCP and rheumatologist outpatient     Physical exam at the time of discharge:  PHYSICAL EXAM:  General: NAD  HEENT: MMM, ulcer on roof of mouth improving  Neck: supple  Cardiovascular: +S1/S2; RRR  Respiratory: CTA B/L; no W/R/R  Gastrointestinal: soft, NT/ND  Extremities: WWP; no edema, clubbing. Diffuse purpura on both UE. Petechiae and 2 purpura on abdomen, purpura and ecchymosis on lower extremities, pretibial nodule right LE.  Vascular: 2+ radial, DP/PT pulses B/L  Neurological: AAOx3; no focal deficits       #Discharge: do not delete    62 y/o F, with a past medical history of hypothyroidism, HLD, depression, arthritis, ITP (new diagnosis, initially admitted 6/23) sent to ED by heme/onc out patient for platelet count "2", found to be thrombocytopenic, admitted now for further management of severe thrombocytopenia 2/2 to refractory ITP. Condition refractory to IVIG and steroids. Successfully completed 1 dose of Rituximab infused over 4 hrs on 7/20.     Hospital course (by problem):     #Thrombocytopenia: Patient has a medical history of thrombocytopenia. Her first admission on 6/23 was for thrombocytopenia, ITP as probable diagnosis. Last admission, pt received 4 days of Decadron and 2 days IVIG, discharged with platelets of 67. Her prior workup for thrombocytopenia is as follows: HIV and HCV negative, RF WNL, B12/Folate WNL, US abdomen no hepatosplenomegaly, H. pylori ab positive. She returned to the ED for thrombocytopenia with a platelet count of 2 on outpatient heme/onc labs (7/15), her platelet count was 1 in the ED. She received two doses of 65mg IVIG on 7/15 and 7/16. ITP was refractory to IVIG so she was started on Decadron. She finished 1 dose of Rituzimab 700mg infused over 4hrs, we continued to monitor for signs of bleeding. She will follow up with Dr. Fulton outpatient.     #Arthritis: Patient has a know history of chronic joint stiffness of entire body including back and knees. She follows were her outpatient rheumatologist Dr. Daly for arthritis in setting of thrombocytopenia. Her pertinent labs are as follows: ERIN 1:80 on 6/24, Anti-dsDNA, anti-RNP, anti-Smith, rheumatoid factor negative 6/24. We held the patients home colchicine as there are cases reporting thrombocytopenia. She will follow up with Dr. Daly.     #Hypothyroidism: Patient has a known history of hypothyroidism. She takes levothyroxine 137mcg 5 times a week and 125mcg 2 times a week (weekends) at home. We continued her home Levothyroxine regimen.      #Depression: Patient has a known medical history of depression. We continued her home medication of Zoloft 50mg Q24.     #HLD (hyperlipidemia): Patient has a know medical history of HLD.  She takes Ezetimibe 10mg qD at home. We held the therapeutic interchange with statins as patient does not tolerate statins.    Patient was discharged to: Home    New medications:   -None    Changes to old medications:  Please hold off on taking colchicine and Ezetimibe as there is research showing these medications can be associated with ITP.     Medications that were stopped:  None    Items to follow up as outpatient:  CBC, CMP, PT, PTT, INR  Please follow up with your PCP and rheumatologist outpatient     Physical exam at the time of discharge:  PHYSICAL EXAM:  General: NAD  HEENT: MMM, ulcer on roof of mouth improving  Neck: supple  Cardiovascular: +S1/S2; RRR  Respiratory: CTA B/L; no W/R/R  Gastrointestinal: soft, NT/ND  Extremities: WWP; no edema, clubbing. Diffuse purpura on both UE. Petechiae and 2 purpura on abdomen, purpura and ecchymosis on lower extremities, pretibial nodule right LE.  Vascular: 2+ radial, DP/PT pulses B/L  Neurological: AAOx3; no focal deficits

## 2021-07-20 NOTE — PROGRESS NOTE ADULT - PROBLEM SELECTOR PLAN 6
Known HLD. Takes Ezetimibe 10mg qD at home.   -will hold therapeutic interchange with statins as pt does not tolerate statins Known HLD. Takes Ezetimibe 10mg qD at home.   -holding therapeutic interchange with statins as pt does not tolerate statins

## 2021-07-20 NOTE — PROGRESS NOTE ADULT - SUBJECTIVE AND OBJECTIVE BOX
OVERNIGHT EVENTS:    SUBJECTIVE / INTERVAL HPI: Patient seen and examined at bedside.     VITAL SIGNS:  Vital Signs Last 24 Hrs  T(C): 36.6 (20 Jul 2021 06:11), Max: 36.7 (19 Jul 2021 08:57)  T(F): 97.9 (20 Jul 2021 06:11), Max: 98.1 (19 Jul 2021 17:29)  HR: 69 (20 Jul 2021 06:11) (63 - 85)  BP: 128/83 (20 Jul 2021 06:11) (119/82 - 134/86)  BP(mean): --  RR: 19 (20 Jul 2021 06:11) (18 - 20)  SpO2: 97% (20 Jul 2021 06:11) (95% - 98%)    ROS: All other review of systems are negative    PE  General: NAD  HEENT: MMM, ulcer on roof of mouth improving  Neck: supple  Cardiovascular: +S1/S2; RRR  Respiratory: CTA B/L; no W/R/R  Gastrointestinal: soft, NT/ND  Extremities: WWP; no edema, clubbing. Diffuse purpura on both UE. Petechiae and 2 purpura on abdomen, purpura and ecchymosis on lower extremities, pretibial nodule right LE.  Vascular: 2+ radial, DP/PT pulses B/L  Neurological: AAOx3; no focal deficits    MEDICATIONS:  MEDICATIONS  (STANDING):  acetaminophen   Tablet .. 650 milliGRAM(s) Oral once  dexAMETHasone     Tablet 40 milliGRAM(s) Oral every 24 hours  diphenhydrAMINE 50 milliGRAM(s) Oral once  levothyroxine 137 MICROGram(s) Oral <User Schedule>  levothyroxine 125 MICROGram(s) Oral <User Schedule>  methylPREDNISolone sodium succinate IVPB 125 milliGRAM(s) IV Intermittent once  riTUXimab IVPB (Non - oncologic) 700 milliGRAM(s) IV Intermittent once  sertraline 50 milliGRAM(s) Oral daily    MEDICATIONS  (PRN):      ALLERGIES:  Allergies  azithromycin (Rash)  penicillin (Rash)  vancomycin (Rash)      LABS:                        12.4   7.98  )-----------( 10       ( 20 Jul 2021 06:56 )             37.0     07-20    134<L>  |  108  |  28<H>  ----------------------------<  120<H>  4.4   |  20<L>  |  0.86    Ca    8.8      20 Jul 2021 06:56  Phos  3.1     07-20  Mg     2.2     07-20    TPro  8.7<H>  /  Alb  3.0<L>  /  TBili  0.4  /  DBili  x   /  AST  46<H>  /  ALT  44  /  AlkPhos  68  07-20    RADIOLOGY & ADDITIONAL TESTS: Reviewed.  < from: CT Head No Cont (07.17.21 @ 10:37) >  COMPARISON: CT head 6/23/2021  FINDINGS: The CT examination demonstrates the ventricles, cisternal spaces, and cortical sulci to be within normal limits. There is no midline shift or extra axial collections. The gray white differentiation appears within normal limits. There is no intracranial hemorrhage or acute transcortical infarct. The bony windows demonstrates no fractures. The visualized paranasal sinuses are within normal limits. The mastoid air cells are well aerated.  IMPRESSION: No intracranial hemorrhage.  < end of copied text >   OVERNIGHT EVENTS: No overnight events     SUBJECTIVE / INTERVAL HPI: Patient seen and examined at bedside. She was sitting comfortably in bed today. Her only complaint today is body stiffness that she attributes to her arthritis, She also mentioned she has two new bruises on the sides of her abdomen. She denies any nausea, vomiting, fever, chills, shortness of breath.      VITAL SIGNS:  Vital Signs Last 24 Hrs  T(C): 36.6 (20 Jul 2021 06:11), Max: 36.7 (19 Jul 2021 08:57)  T(F): 97.9 (20 Jul 2021 06:11), Max: 98.1 (19 Jul 2021 17:29)  HR: 69 (20 Jul 2021 06:11) (63 - 85)  BP: 128/83 (20 Jul 2021 06:11) (119/82 - 134/86)  RR: 19 (20 Jul 2021 06:11) (18 - 20)  SpO2: 97% (20 Jul 2021 06:11) (95% - 98%)    ROS: All other review of systems are negative    PHYSICAL EXAML  General: NAD  HEENT: MMM, ulcer on roof of mouth improving  Neck: supple  Cardiovascular: +S1/S2; RRR  Respiratory: CTA B/L; no W/R/R  Gastrointestinal: soft, NT/ND  Extremities: WWP; no edema, clubbing. Diffuse purpura on both UE. Petechiae and 2 purpura on abdomen, purpura and ecchymosis on lower extremities, pretibial nodule right LE.  Vascular: 2+ radial, DP/PT pulses B/L  Neurological: AAOx3; no focal deficits    MEDICATIONS:  MEDICATIONS  (STANDING):  acetaminophen   Tablet .. 650 milliGRAM(s) Oral once  dexAMETHasone     Tablet 40 milliGRAM(s) Oral every 24 hours  diphenhydrAMINE 50 milliGRAM(s) Oral once  levothyroxine 137 MICROGram(s) Oral <User Schedule>  levothyroxine 125 MICROGram(s) Oral <User Schedule>  methylPREDNISolone sodium succinate IVPB 125 milliGRAM(s) IV Intermittent once  riTUXimab IVPB (Non - oncologic) 700 milliGRAM(s) IV Intermittent once  sertraline 50 milliGRAM(s) Oral daily    MEDICATIONS  (PRN):      ALLERGIES:  Allergies  azithromycin (Rash)  penicillin (Rash)  vancomycin (Rash)      LABS:                        12.4   7.98  )-----------( 10       ( 20 Jul 2021 06:56 )             37.0     07-20    134<L>  |  108  |  28<H>  ----------------------------<  120<H>  4.4   |  20<L>  |  0.86    Ca    8.8      20 Jul 2021 06:56  Phos  3.1     07-20  Mg     2.2     07-20    TPro  8.7<H>  /  Alb  3.0<L>  /  TBili  0.4  /  DBili  x   /  AST  46<H>  /  ALT  44  /  AlkPhos  68  07-20    RADIOLOGY & ADDITIONAL TESTS: Reviewed.  < from: CT Head No Cont (07.17.21 @ 10:37) >  COMPARISON: CT head 6/23/2021  FINDINGS: The CT examination demonstrates the ventricles, cisternal spaces, and cortical sulci to be within normal limits. There is no midline shift or extra axial collections. The gray white differentiation appears within normal limits. There is no intracranial hemorrhage or acute transcortical infarct. The bony windows demonstrates no fractures. The visualized paranasal sinuses are within normal limits. The mastoid air cells are well aerated.  IMPRESSION: No intracranial hemorrhage.  < end of copied text >

## 2021-07-20 NOTE — PROGRESS NOTE ADULT - PROBLEM SELECTOR PLAN 7
F: none indicated  E: replete if K<4 or Mg<2  Diet: DASH/TLC diet  DVT prophylaxis: SCIDs given thrombocytopenia  FULL CODE F: none indicated  E: replete if K<4 or Mg<2  Diet: DASH/TLC diet  DVT prophylaxis: SCIDs given thrombocytopenia  FULL CODE  Dispo: Possible discharge tomorrow 7/21

## 2021-07-20 NOTE — DISCHARGE NOTE PROVIDER - CARE PROVIDERS DIRECT ADDRESSES
,DirectAddress_Unknown ,DirectAddress_Unknown,[email or web address] ,DirectAddress_Unknown,[email or web address]

## 2021-07-20 NOTE — DISCHARGE NOTE PROVIDER - NSDCMRMEDTOKEN_GEN_ALL_CORE_FT
colchicine 0.6 mg oral tablet: 1 tab(s) orally once a day  ezetimibe 10 mg oral tablet: 1 tab(s) orally once a day  levothyroxine 125 mcg (0.125 mg) oral tablet: 1 tab(s) orally Saturday and Sunday  levothyroxine 137 mcg (0.137 mg) oral capsule: 1 cap(s) orally 5 times a week    Monday -Friday  sertraline 50 mg oral tablet: 1 tab(s) orally once a day   levothyroxine 125 mcg (0.125 mg) oral tablet: 1 tab(s) orally Saturday and Sunday  levothyroxine 137 mcg (0.137 mg) oral capsule: 1 cap(s) orally 5 times a week    Monday -Friday  sertraline 50 mg oral tablet: 1 tab(s) orally once a day

## 2021-07-21 ENCOUNTER — TRANSCRIPTION ENCOUNTER (OUTPATIENT)
Age: 61
End: 2021-07-21

## 2021-07-21 VITALS
TEMPERATURE: 98 F | DIASTOLIC BLOOD PRESSURE: 82 MMHG | OXYGEN SATURATION: 96 % | HEART RATE: 72 BPM | SYSTOLIC BLOOD PRESSURE: 121 MMHG | RESPIRATION RATE: 18 BRPM

## 2021-07-21 LAB
ALBUMIN SERPL ELPH-MCNC: 3.3 G/DL — SIGNIFICANT CHANGE UP (ref 3.3–5)
ALP SERPL-CCNC: 67 U/L — SIGNIFICANT CHANGE UP (ref 40–120)
ALT FLD-CCNC: 36 U/L — SIGNIFICANT CHANGE UP (ref 10–45)
ANION GAP SERPL CALC-SCNC: 5 MMOL/L — SIGNIFICANT CHANGE UP (ref 5–17)
APTT BLD: 20.9 SEC — LOW (ref 27.5–35.5)
AST SERPL-CCNC: 34 U/L — SIGNIFICANT CHANGE UP (ref 10–40)
BASOPHILS # BLD AUTO: 0 K/UL — SIGNIFICANT CHANGE UP (ref 0–0.2)
BASOPHILS NFR BLD AUTO: 0 % — SIGNIFICANT CHANGE UP (ref 0–2)
BILIRUB SERPL-MCNC: 0.4 MG/DL — SIGNIFICANT CHANGE UP (ref 0.2–1.2)
BLD GP AB SCN SERPL QL: NEGATIVE — SIGNIFICANT CHANGE UP
BUN SERPL-MCNC: 24 MG/DL — HIGH (ref 7–23)
CALCIUM SERPL-MCNC: 8.7 MG/DL — SIGNIFICANT CHANGE UP (ref 8.4–10.5)
CHLORIDE SERPL-SCNC: 110 MMOL/L — HIGH (ref 96–108)
CO2 SERPL-SCNC: 21 MMOL/L — LOW (ref 22–31)
CREAT SERPL-MCNC: 0.82 MG/DL — SIGNIFICANT CHANGE UP (ref 0.5–1.3)
EOSINOPHIL # BLD AUTO: 0 K/UL — SIGNIFICANT CHANGE UP (ref 0–0.5)
EOSINOPHIL NFR BLD AUTO: 0 % — SIGNIFICANT CHANGE UP (ref 0–6)
GLUCOSE SERPL-MCNC: 102 MG/DL — HIGH (ref 70–99)
HCT VFR BLD CALC: 39.6 % — SIGNIFICANT CHANGE UP (ref 34.5–45)
HGB BLD-MCNC: 13 G/DL — SIGNIFICANT CHANGE UP (ref 11.5–15.5)
IMM GRANULOCYTES NFR BLD AUTO: 0.6 % — SIGNIFICANT CHANGE UP (ref 0–1.5)
INR BLD: 1.01 — SIGNIFICANT CHANGE UP (ref 0.88–1.16)
LYMPHOCYTES # BLD AUTO: 1.08 K/UL — SIGNIFICANT CHANGE UP (ref 1–3.3)
LYMPHOCYTES # BLD AUTO: 16.9 % — SIGNIFICANT CHANGE UP (ref 13–44)
MAGNESIUM SERPL-MCNC: 2.4 MG/DL — SIGNIFICANT CHANGE UP (ref 1.6–2.6)
MCHC RBC-ENTMCNC: 31.5 PG — SIGNIFICANT CHANGE UP (ref 27–34)
MCHC RBC-ENTMCNC: 32.8 GM/DL — SIGNIFICANT CHANGE UP (ref 32–36)
MCV RBC AUTO: 95.9 FL — SIGNIFICANT CHANGE UP (ref 80–100)
MONOCYTES # BLD AUTO: 0.29 K/UL — SIGNIFICANT CHANGE UP (ref 0–0.9)
MONOCYTES NFR BLD AUTO: 4.5 % — SIGNIFICANT CHANGE UP (ref 2–14)
NEUTROPHILS # BLD AUTO: 4.97 K/UL — SIGNIFICANT CHANGE UP (ref 1.8–7.4)
NEUTROPHILS NFR BLD AUTO: 78 % — HIGH (ref 43–77)
NRBC # BLD: 0 /100 WBCS — SIGNIFICANT CHANGE UP (ref 0–0)
PHOSPHATE SERPL-MCNC: 3.2 MG/DL — SIGNIFICANT CHANGE UP (ref 2.5–4.5)
PLATELET # BLD AUTO: 20 K/UL — CRITICAL LOW (ref 150–400)
POTASSIUM SERPL-MCNC: 4.4 MMOL/L — SIGNIFICANT CHANGE UP (ref 3.5–5.3)
POTASSIUM SERPL-SCNC: 4.4 MMOL/L — SIGNIFICANT CHANGE UP (ref 3.5–5.3)
PROT SERPL-MCNC: 8.9 G/DL — HIGH (ref 6–8.3)
PROTHROM AB SERPL-ACNC: 12.1 SEC — SIGNIFICANT CHANGE UP (ref 10.6–13.6)
RBC # BLD: 4.13 M/UL — SIGNIFICANT CHANGE UP (ref 3.8–5.2)
RBC # FLD: 12.1 % — SIGNIFICANT CHANGE UP (ref 10.3–14.5)
RH IG SCN BLD-IMP: POSITIVE — SIGNIFICANT CHANGE UP
SODIUM SERPL-SCNC: 136 MMOL/L — SIGNIFICANT CHANGE UP (ref 135–145)
WBC # BLD: 6.38 K/UL — SIGNIFICANT CHANGE UP (ref 3.8–10.5)
WBC # FLD AUTO: 6.38 K/UL — SIGNIFICANT CHANGE UP (ref 3.8–10.5)

## 2021-07-21 PROCEDURE — 85730 THROMBOPLASTIN TIME PARTIAL: CPT

## 2021-07-21 PROCEDURE — 83735 ASSAY OF MAGNESIUM: CPT

## 2021-07-21 PROCEDURE — 86850 RBC ANTIBODY SCREEN: CPT

## 2021-07-21 PROCEDURE — 85027 COMPLETE CBC AUTOMATED: CPT

## 2021-07-21 PROCEDURE — 99239 HOSP IP/OBS DSCHRG MGMT >30: CPT | Mod: GC

## 2021-07-21 PROCEDURE — 85049 AUTOMATED PLATELET COUNT: CPT

## 2021-07-21 PROCEDURE — 87635 SARS-COV-2 COVID-19 AMP PRB: CPT

## 2021-07-21 PROCEDURE — 80076 HEPATIC FUNCTION PANEL: CPT

## 2021-07-21 PROCEDURE — 86901 BLOOD TYPING SEROLOGIC RH(D): CPT

## 2021-07-21 PROCEDURE — 80053 COMPREHEN METABOLIC PANEL: CPT

## 2021-07-21 PROCEDURE — 70450 CT HEAD/BRAIN W/O DYE: CPT

## 2021-07-21 PROCEDURE — 99285 EMERGENCY DEPT VISIT HI MDM: CPT

## 2021-07-21 PROCEDURE — 85610 PROTHROMBIN TIME: CPT

## 2021-07-21 PROCEDURE — 36415 COLL VENOUS BLD VENIPUNCTURE: CPT

## 2021-07-21 PROCEDURE — 86900 BLOOD TYPING SEROLOGIC ABO: CPT

## 2021-07-21 PROCEDURE — 85025 COMPLETE CBC W/AUTO DIFF WBC: CPT

## 2021-07-21 PROCEDURE — 84100 ASSAY OF PHOSPHORUS: CPT

## 2021-07-21 PROCEDURE — 86769 SARS-COV-2 COVID-19 ANTIBODY: CPT

## 2021-07-21 RX ORDER — EZETIMIBE 10 MG/1
1 TABLET ORAL
Qty: 0 | Refills: 0 | DISCHARGE

## 2021-07-21 RX ORDER — LEVOTHYROXINE SODIUM 125 MCG
1 TABLET ORAL
Qty: 30 | Refills: 0
Start: 2021-07-21

## 2021-07-21 RX ORDER — LEVOTHYROXINE SODIUM 125 MCG
1 TABLET ORAL
Qty: 0 | Refills: 0 | DISCHARGE

## 2021-07-21 RX ADMIN — Medication 137 MICROGRAM(S): at 06:18

## 2021-07-21 RX ADMIN — SERTRALINE 50 MILLIGRAM(S): 25 TABLET, FILM COATED ORAL at 11:33

## 2021-07-21 RX ADMIN — Medication 40 MILLIGRAM(S): at 11:33

## 2021-07-21 NOTE — PROGRESS NOTE ADULT - PROVIDER SPECIALTY LIST ADULT
Heme/Onc
Heme/Onc
Internal Medicine
Heme/Onc
Internal Medicine
Heme/Onc
Internal Medicine

## 2021-07-21 NOTE — DISCHARGE NOTE NURSING/CASE MANAGEMENT/SOCIAL WORK - PATIENT PORTAL LINK FT
You can access the FollowMyHealth Patient Portal offered by [facility] by registering at the following website: [email or web address]. By joining [facility]’s FollowMyHealth portal, you will also be able to view your health information using other applications (apps) compatible with our system.

## 2021-07-21 NOTE — PROGRESS NOTE ADULT - ASSESSMENT
62 y/o F, with a past medical history of hypothyroidism, HLD, depression, arthritis, ITP (new diagnosis, initially admitted 6/23) sent to ED by heme/onc outpt for plt count "2", found to be thrombocytopenic, admitted now for further management of severe thrombocytopenia 2/2 to refractory ITP. Condition refractory to IVIG and steroids. Finished Rituximab 1 dose 7/20.

## 2021-07-21 NOTE — PROGRESS NOTE ADULT - PROBLEM SELECTOR PLAN 4
Known hypothyroidism. Takes levothyroxine 137mcg 5 times a week and 125mcg 2 times a week (weekends) at home.   -C/w home levothyroxine 125mg regimen

## 2021-07-21 NOTE — PROGRESS NOTE ADULT - PROBLEM SELECTOR PLAN 6
Known HLD. Takes Ezetimibe 10mg qD at home.   -holding therapeutic interchange with statins as pt does not tolerate statins

## 2021-07-21 NOTE — DISCHARGE NOTE NURSING/CASE MANAGEMENT/SOCIAL WORK - NSDCFUADDAPPT_GEN_ALL_CORE_FT
Please bring your Insurance card, Photo ID and Discharge paperwork to the following appointment:    (1) Please follow up with your Hematology/Oncology Provider, Dr. Jt Fulton at [address] [place] [address] on 08/02/2021 at 1:00pm.    Appointment was scheduled by Ms. NICOLE Kim, Referral Coordinator.

## 2021-07-21 NOTE — PROGRESS NOTE ADULT - PROBLEM SELECTOR PLAN 3
Chronic joint stiffness of entire body including back and knees. Following outpatient rheumatologist Dr. Daly for arthritis in setting of thrombocytopenia. ERIN 1:80 on 6/24. Anti-dsDNA, anti-RNP, anti-Smith, rheumatoid factor negative 6/24. Taking colchicine 0.6mg qD prescribed by outpatient rheum.   -appreciate hem/onc recs, will not consult rheum at this time  -hold home colchicine as there are cases reporting thrombocytopenia Chronic joint stiffness of entire body including back and knees. Following outpatient rheumatologist Dr. Daly for arthritis in setting of thrombocytopenia. ERIN 1:80 on 6/24. Anti-dsDNA, anti-RNP, anti-Smith, rheumatoid factor negative 6/24. Taking colchicine 0.6mg qD prescribed by outpatient rheum.   -Appreciate hem/onc recs, will not consult rheum at this time  -Hold home colchicine as there are cases reporting thrombocytopenia  -Appointment made outpatient with Dr. Daly

## 2021-07-21 NOTE — PROGRESS NOTE ADULT - PROBLEM SELECTOR PLAN 1
First admission 6/23-6/25 for thrombocytopenia, probably ITP as diagnosis. Last admission, pt received 4 days of Decadron and 2 days IVIG, discharged with plt 67. Had prior workup for thrombocytopenia as follows: HIV and HCV negative. RF WNL, B12/Folate WNL. US abdomen no hepatosplenomegaly. H. pylori ab positive. Now back to ED for thrombocytopenia "plt 2" on outpatient heme/onc labs, plt count 1 in ED count increasing. Received 65mg IVIG on 7/15 and 7/16.    -Heme/onc following-rituzimab 700mg infused over 4hrs (1 dose) 7/20  -platelet of 10 today  -C/W decadron day 4/5  -will continue to monitor for signs of bleeding  -Monitor CBC  -Maintain active T + S, transfuse platelets if clinically important bleeding develops  -Make f/u appointment with Dr. Fulton- outpatient heme First admission 6/23-6/25 for thrombocytopenia, probably ITP as diagnosis. Last admission, pt received 4 days of Decadron and 2 days IVIG, discharged with plt 67. Had prior workup for thrombocytopenia as follows: HIV and HCV negative. RF WNL, B12/Folate WNL. US abdomen no hepatosplenomegaly. H. pylori ab positive. Now back to ED for thrombocytopenia "plt 2" on outpatient heme/onc labs, plt count 1 in ED count increasing. Received 65mg IVIG on 7/15 and 7/16.    -Heme/onc following-rituzimab 700mg infused over 4hrs (1 dose) 7/20  -platelet of 20 today  -C/W decadron day 4/5  -will continue to monitor for signs of bleeding  -Monitor CBC  -Maintain active T + S, transfuse platelets if clinically important bleeding develops  -Make f/u appointment with Dr. Fulton- outpatient heme First admission 6/23-6/25 for thrombocytopenia, probably ITP as diagnosis. Last admission, pt received 4 days of Decadron and 2 days IVIG, discharged with plt 67. Had prior workup for thrombocytopenia as follows: HIV and HCV negative. RF WNL, B12/Folate WNL. US abdomen no hepatosplenomegaly. H. pylori ab positive. Now back to ED for thrombocytopenia "plt 2" on outpatient heme/onc labs, plt count 1 in ED count increasing. Received 65mg IVIG on 7/15 and 7/16. Platelets now trending up after Rituximab. Completed decadron day 4.     -Heme/onc following-Rituximab 700mg infused over 4hrs (1 dose) 7/20  -Platelet of 20 (7/20)  -Will continue to monitor for signs of bleeding  -Monitor CBC  -Maintain active T + S, transfuse platelets if clinically important bleeding develops  -Make f/u appointment with Dr. Fulton- outpatient heme

## 2021-07-21 NOTE — PROGRESS NOTE ADULT - ASSESSMENT
60 yo F, [place], with PMHx of ITP (on treatment with steroids/IVIG) hypothyroidism, hyperlipidemia, anxiety/depression, macular degeneration, bilateral knee osteoarthritis, was sent to ED for low platelets. Patient has had bruising on her arms/legs and petechiae. Hematology consulted for h/o ITP refractory to steroids.     ITP, refractory to steroids and IVIG  Previously treated with IV dexamethasone 40mg QD x 4 days and IVIG x 2 doses. Had prior workup for thrombocytopenia as follows: HIV and HCV negative. RF WNL, B12/Folate WNL. Peripheral flow cytometry (06/23) did not show any circulating blasts or abnormal cell population. US Abdomen did not show hepatosplenomegaly. H. Pylori Abs positive, has not seen GI as outpatient yet. ERIN titer 1:80, seen by rheum  -s/p IVIG w/o improvement  -s/p Dexamethasone 40 mg QD for 4 days w/o improvement  -s/p Rituximab on 7/20 with improvement in platelet count to 20      Hematologically patient cleared for discharge. She can follow up with  for repeat CBC later this week and Rituximab on Tuesday. The office will call her with details.      Discussed with Dr. Mercado

## 2021-07-21 NOTE — PROGRESS NOTE ADULT - REASON FOR ADMISSION
Thrombocytopenia
Thrombocytopenia 2/2 to ITP
Thrombocytopenia
thrombocytopenia 2/2 ITP
thrombocytopenia

## 2021-07-21 NOTE — PROGRESS NOTE ADULT - ATTENDING COMMENTS
Pt seen and examined by me case d/w housestaff agree with VS, PE, assessment and plan as outlined above
pt seen and examined by me  denies headache or other complaints today  minimal improvement in platelet count    1- ITP- minimal response to IVIG  started steroids today per heme onc  2- hypothyroidism  3- HPL
Patient seen and examined at bedside. Agree with exam, a/p as above with the following addendum/edits:     Platelet count did not respond to IVIG and steroids over the weekend. This is now her third visit w/ failure of platelet count to increase. Plan for transfer to  for rituxan.
Please see my addendum to today's discharge summary for additional information/details.     Dilma Rojas MD  Hospitalist Attending  [phone]
Patient was seen and examined with the resident team today.  I agree with Dr. Mckeon's assessment and plan with the following exceptions/additions:     Briefly, this is a 62yo woman with a PMH of hypothyroidism, HLD, mild recurrent MDD, arthritis NOS and refractory ITP s/p multiple re-admissions for IVIG/steroids who re-presented with thrombocytopenia.  Initially started in IVIG and steroids, then transferred to [facility] for Rituxan on the evening o 7/19.     #Refractory ITP - plan to give Rituxan this AM and f/u CBC tomorrow; Decadron until 7/21; ensure f/u as an outpatient   #Hypothyroidism - c/w Synthroid; ensure f/u as an outpatient   #Mild recurrent MDD - c/w Zoloft   #DVT PPx - ambulatory   #Dispo - home likely 7/21     Dilma Rojas  [phone]

## 2021-07-21 NOTE — PROGRESS NOTE ADULT - SUBJECTIVE AND OBJECTIVE BOX
INTERVAL HPI/OVERNIGHT EVENTS:    Pt was seen and examined at the bedside. She was observed to be lying down comfortably.   Pt offers no complaints. Platelets improved to 20 today.      VITAL SIGNS:  T(F): 97.7 (07-21-21 @ 05:52)  HR: 68 (07-21-21 @ 05:52)  BP: 114/75 (07-21-21 @ 05:52)  RR: 17 (07-21-21 @ 05:52)  SpO2: 97% (07-21-21 @ 05:52)  Wt(kg): --  I&O's Summary    PHYSICAL EXAM:  Constitutional: NAD, well-groomed, well-developed  HEENT: PERRLA, EOMI, Normal Hearing, MMM  Neck: No LAD, No JVD  Back: Normal spine flexure, No CVA tenderness  Respiratory: CTAB  Cardiovascular: S1 and S2, RRR, no M/G/R  Gastrointestinal: BS+, soft, NT/ND  Extremities: No peripheral edema  Vascular: 2+ peripheral pulses  Neurological: A/O x 3, no focal deficits  Psychiatric: Normal mood, normal affect  Musculoskeletal: 5/5 strength b/l upper and lower extremities  Skin: Bruising        MEDICATIONS  (STANDING):  dexAMETHasone     Tablet 40 milliGRAM(s) Oral every 24 hours  levothyroxine 137 MICROGram(s) Oral <User Schedule>  levothyroxine 125 MICROGram(s) Oral <User Schedule>  sertraline 50 milliGRAM(s) Oral daily    MEDICATIONS  (PRN):      Allergies    azithromycin (Rash)  penicillin (Rash)  vancomycin (Rash)    Intolerances        LABS:  CBC Full  -  ( 21 Jul 2021 07:08 )  WBC Count : 6.38 K/uL  RBC Count : 4.13 M/uL  Hemoglobin : 13.0 g/dL  Hematocrit : 39.6 %  Platelet Count - Automated : 20 K/uL  Mean Cell Volume : 95.9 fl  Mean Cell Hemoglobin : 31.5 pg  Mean Cell Hemoglobin Concentration : 32.8 gm/dL  Auto Neutrophil # : 4.97 K/uL  Auto Lymphocyte # : 1.08 K/uL  Auto Monocyte # : 0.29 K/uL  Auto Eosinophil # : 0.00 K/uL  Auto Basophil # : 0.00 K/uL  Auto Neutrophil % : 78.0 %  Auto Lymphocyte % : 16.9 %  Auto Monocyte % : 4.5 %  Auto Eosinophil % : 0.0 %  Auto Basophil % : 0.0 %    07-21    136  |  110<H>  |  24<H>  ----------------------------<  102<H>  4.4   |  21<L>  |  0.82    Ca    8.7      21 Jul 2021 07:08  Phos  3.2     07-21  Mg     2.4     07-21    TPro  8.9<H>  /  Alb  3.3  /  TBili  0.4  /  DBili  x   /  AST  34  /  ALT  36  /  AlkPhos  67  07-21    PT/INR - ( 21 Jul 2021 07:08 )   PT: 12.1 sec;   INR: 1.01          PTT - ( 21 Jul 2021 07:08 )  PTT:20.9 sec        Activated Partial Thromboplastin Time: 20.9 sec (07-21-21 @ 07:08)  INR: 1.01 (07-21-21 @ 07:08)      RADIOLOGY & ADDITIONAL TESTS: Reviewed.

## 2021-07-21 NOTE — PROGRESS NOTE ADULT - SUBJECTIVE AND OBJECTIVE BOX
OVERNIGHT EVENTS: No overnight events    SUBJECTIVE / INTERVAL HPI: Patient seen and examined at bedside. She was sitting comfortably in bed today. She has no complaints this morning. She said the infusion went well and she doesn't have any new purpura or pain today. She denies any nausea, vomiting, fever, chills, shortness of breath.      VITAL SIGNS:  Vital Signs Last 24 Hrs  T(C): 36.5 (21 Jul 2021 05:52), Max: 36.7 (20 Jul 2021 19:10)  T(F): 97.7 (21 Jul 2021 05:52), Max: 98.1 (20 Jul 2021 19:10)  HR: 68 (21 Jul 2021 05:52) (68 - 79)  BP: 114/75 (21 Jul 2021 05:52) (112/74 - 126/86)  RR: 17 (21 Jul 2021 05:52) (16 - 18)  SpO2: 97% (21 Jul 2021 05:52) (96% - 98%)    ROS: All other review of systems are negative    PHYSICAL EXAM  General: NAD  HEENT: MMM, ulcer on roof of mouth improving  Neck: supple  Cardiovascular: +S1/S2; RRR  Respiratory: CTA B/L; no W/R/R  Gastrointestinal: soft, NT/ND  Extremities: WWP; no edema, clubbing. Diffuse purpura on both UE. Petechiae and 2 purpura on abdomen, purpura and ecchymosis on lower extremities, pretibial nodule right LE.  Vascular: 2+ radial, DP/PT pulses B/L  Neurological: AAOx3; no focal deficits    MEDICATIONS:  MEDICATIONS  (STANDING):  dexAMETHasone     Tablet 40 milliGRAM(s) Oral every 24 hours  levothyroxine 137 MICROGram(s) Oral <User Schedule>  levothyroxine 125 MICROGram(s) Oral <User Schedule>  sertraline 50 milliGRAM(s) Oral daily    MEDICATIONS  (PRN):    ALLERGIES:  Allergies    azithromycin (Rash)  penicillin (Rash)  vancomycin (Rash)    Intolerances    LABS:                        12.4   7.98  )-----------( 10       ( 20 Jul 2021 06:56 )             37.0              07-21    136  |  110<H>  |  24<H>  ----------------------------<  102<H>  4.4   |  21<L>  |  0.82    Ca    8.7      21 Jul 2021 07:08  Phos  3.2     07-21  Mg     2.4     07-21    TPro  8.9<H>  /  Alb  3.3  /  TBili  0.4  /  DBili  x   /  AST  34  /  ALT  36  /  AlkPhos  67  07-21    PT/INR - ( 21 Jul 2021 07:08 )   PT: 12.1 sec;   INR: 1.01       PTT - ( 21 Jul 2021 07:08 )  PTT:20.9 sec    RADIOLOGY & ADDITIONAL TESTS: Reviewed. OVERNIGHT EVENTS: No overnight events    SUBJECTIVE / INTERVAL HPI: Patient seen and examined at bedside. She was sitting comfortably in bed today. She has no complaints this morning. She said the infusion went well and she doesn't have any new purpura or pain today. She denies any nausea, vomiting, fever, chills, shortness of breath.      VITAL SIGNS:  Vital Signs Last 24 Hrs  T(C): 36.5 (21 Jul 2021 05:52), Max: 36.7 (20 Jul 2021 19:10)  T(F): 97.7 (21 Jul 2021 05:52), Max: 98.1 (20 Jul 2021 19:10)  HR: 68 (21 Jul 2021 05:52) (68 - 79)  BP: 114/75 (21 Jul 2021 05:52) (112/74 - 126/86)  RR: 17 (21 Jul 2021 05:52) (16 - 18)  SpO2: 97% (21 Jul 2021 05:52) (96% - 98%)    ROS: All other review of systems are negative    PHYSICAL EXAM  General: NAD  HEENT: MMM, ulcer on roof of mouth improving  Neck: supple  Cardiovascular: +S1/S2; RRR  Respiratory: CTA B/L; no W/R/R  Gastrointestinal: soft, NT/ND  Extremities: WWP; no edema, clubbing. Diffuse purpura on both UE. Petechiae and 2 purpura on abdomen, purpura and ecchymosis on lower extremities, pretibial nodule right LE.  Vascular: 2+ radial, DP/PT pulses B/L  Neurological: AAOx3; no focal deficits    MEDICATIONS:  MEDICATIONS  (STANDING):  dexAMETHasone     Tablet 40 milliGRAM(s) Oral every 24 hours  levothyroxine 137 MICROGram(s) Oral <User Schedule>  levothyroxine 125 MICROGram(s) Oral <User Schedule>  sertraline 50 milliGRAM(s) Oral daily    MEDICATIONS  (PRN):    ALLERGIES:  Allergies    azithromycin (Rash)  penicillin (Rash)  vancomycin (Rash)    Intolerances    LABS:                                   13.0   6.38  )-----------( 20       ( 21 Jul 2021 07:08 )             39.6     07-21    136  |  110<H>  |  24<H>  ----------------------------<  102<H>  4.4   |  21<L>  |  0.82    Ca    8.7      21 Jul 2021 07:08  Phos  3.2     07-21  Mg     2.4     07-21    TPro  8.9<H>  /  Alb  3.3  /  TBili  0.4  /  DBili  x   /  AST  34  /  ALT  36  /  AlkPhos  67  07-21    PT/INR - ( 21 Jul 2021 07:08 )   PT: 12.1 sec;   INR: 1.01       PTT - ( 21 Jul 2021 07:08 )  PTT:20.9 sec    RADIOLOGY & ADDITIONAL TESTS: Reviewed. OVERNIGHT EVENTS: No overnight events    SUBJECTIVE / INTERVAL HPI: Patient seen and examined at bedside. She was sitting comfortably in bed today. She has no complaints this morning. She said the infusion went well and she doesn't have any new purpura or pain today. She would like appointments made for her with her rheumatologist and a PCP. She denies any nausea, vomiting, fever, chills, shortness of breath.      VITAL SIGNS:  Vital Signs Last 24 Hrs  T(C): 36.5 (21 Jul 2021 05:52), Max: 36.7 (20 Jul 2021 19:10)  T(F): 97.7 (21 Jul 2021 05:52), Max: 98.1 (20 Jul 2021 19:10)  HR: 68 (21 Jul 2021 05:52) (68 - 79)  BP: 114/75 (21 Jul 2021 05:52) (112/74 - 126/86)  RR: 17 (21 Jul 2021 05:52) (16 - 18)  SpO2: 97% (21 Jul 2021 05:52) (96% - 98%)    ROS: All other review of systems are negative    PHYSICAL EXAM  General: NAD  HEENT: MMM, ulcer on roof of mouth improving  Neck: supple  Cardiovascular: +S1/S2; RRR  Respiratory: CTA B/L; no W/R/R  Gastrointestinal: soft, NT/ND  Extremities: WWP; no edema, clubbing. Diffuse purpura on both UE. Petechiae and 2 purpura on abdomen, purpura and ecchymosis on lower extremities, pretibial nodule right LE.  Vascular: 2+ radial, DP/PT pulses B/L  Neurological: AAOx3; no focal deficits    MEDICATIONS:  MEDICATIONS  (STANDING):  dexAMETHasone     Tablet 40 milliGRAM(s) Oral every 24 hours  levothyroxine 137 MICROGram(s) Oral <User Schedule>  levothyroxine 125 MICROGram(s) Oral <User Schedule>  sertraline 50 milliGRAM(s) Oral daily    MEDICATIONS  (PRN):    ALLERGIES:  Allergies    azithromycin (Rash)  penicillin (Rash)  vancomycin (Rash)    Intolerances    LABS:                                   13.0   6.38  )-----------( 20       ( 21 Jul 2021 07:08 )             39.6   Complete Blood Count + Automated Diff (07.21.21 @ 07:08)      Auto Neutrophil #: 4.97 K/uL    Auto Lymphocyte #: 1.08 K/uL    Auto Monocyte #: 0.29 K/uL    Auto Eosinophil #: 0.00 K/uL    Auto Basophil #: 0.00 K/uL    Auto Neutrophil %: 78.0: Differential percentages must be correlated with absolute numbers for  clinical significance. %    Auto Lymphocyte %: 16.9 %    Auto Monocyte %: 4.5 %    Auto Eosinophil %: 0.0 %    Auto Basophil %: 0.0 %    Auto Immature Granulocyte %: 0.6: (Includes meta, myelo and promyelocytes) %    Nucleated RBC: 0 /100 WBCs    07-21    136  |  110<H>  |  24<H>  ----------------------------<  102<H>  4.4   |  21<L>  |  0.82    Ca    8.7      21 Jul 2021 07:08  Phos  3.2     07-21  Mg     2.4     07-21    TPro  8.9<H>  /  Alb  3.3  /  TBili  0.4  /  DBili  x   /  AST  34  /  ALT  36  /  AlkPhos  67  07-21    PT/INR - ( 21 Jul 2021 07:08 )   PT: 12.1 sec;   INR: 1.01       PTT - ( 21 Jul 2021 07:08 )  PTT:20.9 sec    RADIOLOGY & ADDITIONAL TESTS: Reviewed.

## 2021-07-21 NOTE — PROGRESS NOTE ADULT - PROBLEM SELECTOR PLAN 2
Initially admitted 6/23-6/25 for thrombocytopenia, ITP as new diagnosis.  -See above for plan  -Rituximab 700mg over 4hrs 1 dose (7/20) as per heme/onc

## 2021-07-21 NOTE — PROGRESS NOTE ADULT - PROBLEM SELECTOR PLAN 7
F: none indicated  E: replete if K<4 or Mg<2  Diet: DASH/TLC diet  DVT prophylaxis: SCIDs given thrombocytopenia  FULL CODE  Dispo: Possible discharge tomorrow 7/21 F: None indicated  E: replete if K<4 or Mg<2  Diet: DASH/TLC diet  DVT prophylaxis: SCIDs given thrombocytopenia  FULL CODE  Dispo: Possible discharge tomorrow 7/21

## 2021-07-21 NOTE — PROGRESS NOTE ADULT - NSICDXPILOT_GEN_ALL_CORE
[place]

## 2021-07-25 ENCOUNTER — RESULT CHARGE (OUTPATIENT)
Age: 61
End: 2021-07-25

## 2021-07-26 ENCOUNTER — APPOINTMENT (OUTPATIENT)
Dept: INTERNAL MEDICINE | Facility: CLINIC | Age: 61
End: 2021-07-26
Payer: COMMERCIAL

## 2021-07-26 ENCOUNTER — APPOINTMENT (OUTPATIENT)
Dept: INTERNAL MEDICINE | Facility: CLINIC | Age: 61
End: 2021-07-26

## 2021-07-26 ENCOUNTER — NON-APPOINTMENT (OUTPATIENT)
Age: 61
End: 2021-07-26

## 2021-07-26 DIAGNOSIS — M79.602 PAIN IN LEFT ARM: ICD-10-CM

## 2021-07-26 DIAGNOSIS — D69.6 THROMBOCYTOPENIA, UNSPECIFIED: ICD-10-CM

## 2021-07-26 DIAGNOSIS — R06.02 SHORTNESS OF BREATH: ICD-10-CM

## 2021-07-26 DIAGNOSIS — E03.9 HYPOTHYROIDISM, UNSPECIFIED: ICD-10-CM

## 2021-07-26 DIAGNOSIS — F32.9 MAJOR DEPRESSIVE DISORDER, SINGLE EPISODE, UNSPECIFIED: ICD-10-CM

## 2021-07-26 DIAGNOSIS — Z86.2 PERSONAL HISTORY OF DISEASES OF THE BLOOD AND BLOOD-FORMING ORGANS AND CERTAIN DISORDERS INVOLVING THE IMMUNE MECHANISM: ICD-10-CM

## 2021-07-26 DIAGNOSIS — F41.8 OTHER SPECIFIED ANXIETY DISORDERS: ICD-10-CM

## 2021-07-26 DIAGNOSIS — Z88.0 ALLERGY STATUS TO PENICILLIN: ICD-10-CM

## 2021-07-26 DIAGNOSIS — Z86.19 PERSONAL HISTORY OF OTHER INFECTIOUS AND PARASITIC DISEASES: ICD-10-CM

## 2021-07-26 DIAGNOSIS — M19.90 UNSPECIFIED OSTEOARTHRITIS, UNSPECIFIED SITE: ICD-10-CM

## 2021-07-26 DIAGNOSIS — Z88.1 ALLERGY STATUS TO OTHER ANTIBIOTIC AGENTS STATUS: ICD-10-CM

## 2021-07-26 DIAGNOSIS — E78.5 HYPERLIPIDEMIA, UNSPECIFIED: ICD-10-CM

## 2021-07-26 PROCEDURE — 99214 OFFICE O/P EST MOD 30 MIN: CPT | Mod: GC

## 2021-07-26 PROCEDURE — 99072 ADDL SUPL MATRL&STAF TM PHE: CPT

## 2021-07-26 RX ORDER — EZETIMIBE 10 MG/1
10 TABLET ORAL
Qty: 90 | Refills: 0 | Status: DISCONTINUED | COMMUNITY
Start: 2021-05-14 | End: 2021-07-26

## 2021-07-27 ENCOUNTER — NON-APPOINTMENT (OUTPATIENT)
Age: 61
End: 2021-07-27

## 2021-07-27 PROBLEM — R06.02 SHORTNESS OF BREATH: Status: ACTIVE | Noted: 2021-07-26

## 2021-07-27 PROBLEM — M79.602 PAIN OF LEFT UPPER EXTREMITY: Status: ACTIVE | Noted: 2021-07-26

## 2021-07-27 PROBLEM — Z86.19 HISTORY OF HELICOBACTER PYLORI INFECTION: Status: ACTIVE | Noted: 2021-07-26

## 2021-07-27 LAB
TSH SERPL-ACNC: 7.3 UIU/ML
UREA BREATH TEST QL: POSITIVE

## 2021-07-29 ENCOUNTER — INPATIENT (INPATIENT)
Facility: HOSPITAL | Age: 61
LOS: 2 days | Discharge: HOME | DRG: 813 | End: 2021-08-01
Attending: HOSPITALIST | Admitting: INTERNAL MEDICINE
Payer: COMMERCIAL

## 2021-07-29 VITALS
SYSTOLIC BLOOD PRESSURE: 104 MMHG | DIASTOLIC BLOOD PRESSURE: 66 MMHG | HEIGHT: 63 IN | WEIGHT: 182.98 LBS | RESPIRATION RATE: 18 BRPM | OXYGEN SATURATION: 98 % | HEART RATE: 100 BPM | TEMPERATURE: 98 F

## 2021-07-29 LAB
ALBUMIN SERPL ELPH-MCNC: 3.1 G/DL — LOW (ref 3.3–5)
ALP SERPL-CCNC: 68 U/L — SIGNIFICANT CHANGE UP (ref 40–120)
ALT FLD-CCNC: 33 U/L — SIGNIFICANT CHANGE UP (ref 10–45)
ANION GAP SERPL CALC-SCNC: 7 MMOL/L — SIGNIFICANT CHANGE UP (ref 5–17)
APTT BLD: 28.7 SEC — SIGNIFICANT CHANGE UP (ref 27.5–35.5)
AST SERPL-CCNC: 46 U/L — HIGH (ref 10–40)
BASOPHILS # BLD AUTO: 0.19 K/UL — SIGNIFICANT CHANGE UP (ref 0–0.2)
BASOPHILS NFR BLD AUTO: 4.5 % — HIGH (ref 0–2)
BILIRUB SERPL-MCNC: 0.7 MG/DL — SIGNIFICANT CHANGE UP (ref 0.2–1.2)
BLD GP AB SCN SERPL QL: NEGATIVE — SIGNIFICANT CHANGE UP
BUN SERPL-MCNC: 12 MG/DL — SIGNIFICANT CHANGE UP (ref 7–23)
CALCIUM SERPL-MCNC: 9.1 MG/DL — SIGNIFICANT CHANGE UP (ref 8.4–10.5)
CHLORIDE SERPL-SCNC: 105 MMOL/L — SIGNIFICANT CHANGE UP (ref 96–108)
CO2 SERPL-SCNC: 25 MMOL/L — SIGNIFICANT CHANGE UP (ref 22–31)
CREAT SERPL-MCNC: 1.16 MG/DL — SIGNIFICANT CHANGE UP (ref 0.5–1.3)
DACRYOCYTES BLD QL SMEAR: SLIGHT — SIGNIFICANT CHANGE UP
EOSINOPHIL # BLD AUTO: 0.04 K/UL — SIGNIFICANT CHANGE UP (ref 0–0.5)
EOSINOPHIL NFR BLD AUTO: 0.9 % — SIGNIFICANT CHANGE UP (ref 0–6)
GLUCOSE SERPL-MCNC: 92 MG/DL — SIGNIFICANT CHANGE UP (ref 70–99)
HCT VFR BLD CALC: 37.7 % — SIGNIFICANT CHANGE UP (ref 34.5–45)
HGB BLD-MCNC: 12.2 G/DL — SIGNIFICANT CHANGE UP (ref 11.5–15.5)
INR BLD: 1.11 — SIGNIFICANT CHANGE UP (ref 0.88–1.16)
LYMPHOCYTES # BLD AUTO: 1.6 K/UL — SIGNIFICANT CHANGE UP (ref 1–3.3)
LYMPHOCYTES # BLD AUTO: 36.9 % — SIGNIFICANT CHANGE UP (ref 13–44)
MANUAL SMEAR VERIFICATION: SIGNIFICANT CHANGE UP
MCHC RBC-ENTMCNC: 30.9 PG — SIGNIFICANT CHANGE UP (ref 27–34)
MCHC RBC-ENTMCNC: 32.4 GM/DL — SIGNIFICANT CHANGE UP (ref 32–36)
MCV RBC AUTO: 95.4 FL — SIGNIFICANT CHANGE UP (ref 80–100)
MONOCYTES # BLD AUTO: 0.23 K/UL — SIGNIFICANT CHANGE UP (ref 0–0.9)
MONOCYTES NFR BLD AUTO: 5.4 % — SIGNIFICANT CHANGE UP (ref 2–14)
MYELOCYTES NFR BLD: 0.9 % — HIGH (ref 0–0)
NEUTROPHILS # BLD AUTO: 2.23 K/UL — SIGNIFICANT CHANGE UP (ref 1.8–7.4)
NEUTROPHILS NFR BLD AUTO: 48.7 % — SIGNIFICANT CHANGE UP (ref 43–77)
NEUTS BAND # BLD: 2.7 % — SIGNIFICANT CHANGE UP (ref 0–8)
PLAT MORPH BLD: NORMAL — SIGNIFICANT CHANGE UP
PLATELET # BLD AUTO: 2 K/UL — CRITICAL LOW (ref 150–400)
POIKILOCYTOSIS BLD QL AUTO: SLIGHT — SIGNIFICANT CHANGE UP
POLYCHROMASIA BLD QL SMEAR: SLIGHT — SIGNIFICANT CHANGE UP
POTASSIUM SERPL-MCNC: 4.1 MMOL/L — SIGNIFICANT CHANGE UP (ref 3.5–5.3)
POTASSIUM SERPL-SCNC: 4.1 MMOL/L — SIGNIFICANT CHANGE UP (ref 3.5–5.3)
PROT SERPL-MCNC: 7.4 G/DL — SIGNIFICANT CHANGE UP (ref 6–8.3)
PROTHROM AB SERPL-ACNC: 13.3 SEC — SIGNIFICANT CHANGE UP (ref 10.6–13.6)
RBC # BLD: 3.95 M/UL — SIGNIFICANT CHANGE UP (ref 3.8–5.2)
RBC # FLD: 12.3 % — SIGNIFICANT CHANGE UP (ref 10.3–14.5)
RBC BLD AUTO: ABNORMAL
RH IG SCN BLD-IMP: POSITIVE — SIGNIFICANT CHANGE UP
SARS-COV-2 RNA SPEC QL NAA+PROBE: NEGATIVE — SIGNIFICANT CHANGE UP
SMUDGE CELLS # BLD: PRESENT — SIGNIFICANT CHANGE UP
SODIUM SERPL-SCNC: 137 MMOL/L — SIGNIFICANT CHANGE UP (ref 135–145)
WBC # BLD: 4.33 K/UL — SIGNIFICANT CHANGE UP (ref 3.8–10.5)
WBC # FLD AUTO: 4.33 K/UL — SIGNIFICANT CHANGE UP (ref 3.8–10.5)

## 2021-07-29 PROCEDURE — 99223 1ST HOSP IP/OBS HIGH 75: CPT | Mod: GC

## 2021-07-29 PROCEDURE — 99285 EMERGENCY DEPT VISIT HI MDM: CPT

## 2021-07-29 RX ORDER — IMMUNE GLOBULIN (HUMAN) 10 G/100ML
65 INJECTION INTRAVENOUS; SUBCUTANEOUS ONCE
Refills: 0 | Status: COMPLETED | OUTPATIENT
Start: 2021-07-29 | End: 2021-07-29

## 2021-07-29 RX ORDER — DEXAMETHASONE 0.5 MG/5ML
40 ELIXIR ORAL ONCE
Refills: 0 | Status: COMPLETED | OUTPATIENT
Start: 2021-07-29 | End: 2021-07-29

## 2021-07-29 RX ORDER — ACETAMINOPHEN 500 MG
650 TABLET ORAL ONCE
Refills: 0 | Status: COMPLETED | OUTPATIENT
Start: 2021-07-29 | End: 2021-07-29

## 2021-07-29 RX ORDER — DIPHENHYDRAMINE HCL 50 MG
50 CAPSULE ORAL ONCE
Refills: 0 | Status: COMPLETED | OUTPATIENT
Start: 2021-07-29 | End: 2021-07-29

## 2021-07-29 RX ADMIN — Medication 650 MILLIGRAM(S): at 22:02

## 2021-07-29 RX ADMIN — Medication 650 MILLIGRAM(S): at 23:19

## 2021-07-29 RX ADMIN — IMMUNE GLOBULIN (HUMAN) 162.5 GRAM(S): 10 INJECTION INTRAVENOUS; SUBCUTANEOUS at 22:36

## 2021-07-29 RX ADMIN — Medication 40 MILLIGRAM(S): at 20:51

## 2021-07-29 RX ADMIN — Medication 50 MILLIGRAM(S): at 22:02

## 2021-07-29 NOTE — ED PROVIDER NOTE - PROGRESS NOTE DETAILS
Left message for pt's on-call hematologist. d/w Dr Mercado: start IVIG 1 g/kg, start decadron 40 mg po daily. admit to hospitalist.

## 2021-07-29 NOTE — ED PROVIDER NOTE - PHYSICAL EXAMINATION
CONSTITUTIONAL: NAD   SKIN: Normal color and turgor.   Bruises up to 5-6 cm diameter to legs, left breast.     HEAD: NC/AT.  EYES: Conjunctiva clear. EOMI. PERRL.    ENT: Airway clear. Normal voice.   RESPIRATORY:  Normal work of breathing. Lungs CTAB.  CARDIOVASCULAR:  RRR, S1S2. No M/R/G.      GI:  Abdomen soft, nontender.    MSK: Neck supple.  No lower extremity edema or calf tenderness.  No joint swelling or ROM limitation.  NEURO: Alert and oriented; CN II-XII grossly intact. Speech clear. 5/5 strength in all extremities.  Good balance. Steady gait. CONSTITUTIONAL: NAD   SKIN: Normal color and turgor.   Bruises up to 5-6 cm diameter to legs, left breast.     HEAD: NC/AT.  EYES: Conjunctiva clear. EOMI. PERRL.    ENT: Airway clear. Normal voice.   RESPIRATORY:  Normal work of breathing. Lungs CTAB.  CARDIOVASCULAR:  RRR, S1S2. No M/R/G.      GI:  Abdomen soft, nontender.    MSK: Neck supple.  No lower extremity edema or calf tenderness. No deep muscle hematomas.  No joint swelling or ROM limitation.  NEURO: Alert and oriented; CN II-XII grossly intact. Speech clear. 5/5 strength in all extremities.  Good balance. Steady gait.

## 2021-07-29 NOTE — H&P ADULT - NSHPPHYSICALEXAM_GEN_ALL_CORE
GENERAL: In NAD. Lying in bed comfortably.  HEENT: NC/AT. PERRL. EOMI. Neck supple.   CV: RRR. +S1/S2. No murmurs, rubs or gallops  LUNGS: Clear to auscultation b/l. No wheezing, rales or rhonchi.  ABDOMEN: Soft, nontender, nondistended. +BS  EXT: WWP. No edema in b/l extremities  SKIN: Bruising in all four extremities and by left abdomen

## 2021-07-29 NOTE — H&P ADULT - HISTORY OF PRESENT ILLNESS
60 y/o woman with a PMHx of ITP (newly diagnosed and admitted 6/23) with recent discharge on 7/21 for severe thrombocytopenia with plt 2k, hypothyroidism, HLD, depression, arthritis, ITP (new diagnosis, initially admitted 6/23) sent to ED by outpatient heme/onc provider for platelets of 2k. Pt with recent admission for severe thrombocytopenia also with platelets in 2k, refractory to IVIG and steroids. She received her first dose of Rituximab on 7/20. Since discharge, patient noticed she was feeling overall unwell this past Friday in which her nek and back muscles were aching, which normally signals to her that her platelets are low. She was also recently started on antibiotics and protonix for H. pylori by her PCP at [facility]. She denies any active bleeding, but has noticed new bruising on her extremities and sides. Denies any fever, chills, CP, SOB, hemoptysis, hematochezia, melena, hematuria, abdominal pain, dysuria.    ED Vitals: T 98.5F, /66, , RR 18, SpO2 98% RA  ED Labs: WBC 4.33, Hb 12.2, plt 2k, INR 1.11, sCr 1.16 (baseline 0.82 1 week ago)  ED Imaging: none  ED Management: PO tylenol 650mg x1. PO decadron 40mg x1, Po benadryl 50mg x1, IVIG 1mg/kg x1

## 2021-07-29 NOTE — H&P ADULT - ASSESSMENT
62 y/o woman with a PMHx of ITP (newly diagnosed and admitted 6/23) with recent discharge on 7/21 for severe thrombocytopenia with plt 2k, hypothyroidism, HLD, depression, arthritis, ITP (new diagnosis, initially admitted 6/23) sent to ED by outpatient heme/onc provider for platelets of 2k. Admitted for severe thrombocytopenia.

## 2021-07-29 NOTE — H&P ADULT - NSHPREVIEWOFSYSTEMS_GEN_ALL_CORE
CONSTITUTIONAL: No weakness, fevers or chills  EYES/ENT: No visual changes  NECK: No pain or stiffness  RESPIRATORY: No cough, wheezing, hemoptysis; No shortness of breath  CARDIOVASCULAR: No chest pain or palpitations  GASTROINTESTINAL: No abdominal or epigastric pain. No nausea, vomiting, or hematemesis. No melena or hematochezia.  GENITOURINARY: No dysuria, frequency or hematuria  NEUROLOGICAL: No numbness or weakness  SKIN: + bruising, no active bleeding  All other review of systems is negative unless indicated above.

## 2021-07-29 NOTE — H&P ADULT - ATTENDING COMMENTS
Opt out #ITP: Known to service, multiple recent admission for severe thrombocytopenia 2/2 ITP s/p IVIG, steroids, rituxan. Sent in by Dr. Fulton for plts 2k- start IVIG per heme recs and decadron 40mg qd for 4 days; monitor plts

## 2021-07-29 NOTE — ED PROVIDER NOTE - OBJECTIVE STATEMENT
62 y/o F, with a past medical history of hypothyroidism, HLD, depression, arthritis, ITP (new diagnosis, initially admitted 6/23) sent to ED by heme/onc out patient for low platelets on CBC yesterday - plt count was 3000.  She has been tx previously with platelet transfusions, IVIG, steroids, and Rituximab.  Now with several days of new bruising to legs, left breast/chest wall.  No headache, focal neuro symptoms, CP/cough/hemoptysis, gingival bleeding, epistaxis, hematuria, bloody stools or melena.  She was referred for admission.  Currently on regimen for H. pylori.  Vaccinated to Covid. 60 y/o F, with a past medical history of hypothyroidism, HLD, depression, arthritis, ITP (new diagnosis, initially admitted 6/23) sent to ED by heme/onc outpatient for low platelets on CBC today - plt count was 3000.  She has been tx previously with platelet transfusions, IVIG, steroids, and Rituximab.  Now with several days of new bruising to legs, left breast/chest wall.  No headache, focal neuro symptoms, CP/cough/hemoptysis, gingival bleeding, epistaxis, hematuria, bloody stools or melena.  She was referred for admission.  Currently on regimen for H. pylori.  Vaccinated to Covid.

## 2021-07-29 NOTE — ED PROVIDER NOTE - ATTENDING WITH...
ACP Intermediate Repair Preamble Text (Leave Blank If You Do Not Want): Wide undermining was performed with blunt dissection.

## 2021-07-29 NOTE — H&P ADULT - PROBLEM SELECTOR PLAN 3
Hx of hypothyroidism, on levothyroxine 137mcg qd M-W, nd 125mcg qd Sat and Sunday  - c/w levothyroxine here

## 2021-07-29 NOTE — H&P ADULT - NSHPLABSRESULTS_GEN_ALL_CORE
12.2   4.33  )-----------( 2        ( 29 Jul 2021 18:52 )             37.7     07-29    137  |  105  |  12  ----------------------------<  92  4.1   |  25  |  1.16    Ca    9.1      29 Jul 2021 18:52    TPro  7.4  /  Alb  3.1<L>  /  TBili  0.7  /  DBili  x   /  AST  46<H>  /  ALT  33  /  AlkPhos  68  07-29    PT/INR - ( 29 Jul 2021 18:52 )   PT: 13.3 sec;   INR: 1.11          PTT - ( 29 Jul 2021 18:52 )  PTT:28.7 sec      RADIOLOGY & ADDITIONAL TESTS: Reviewed

## 2021-07-29 NOTE — ED ADULT NURSE NOTE - OBJECTIVE STATEMENT
presents to ED for transfusion of platelets.  Pt has hx of ITP, was seen in ED about 7 days ago for transfusion of platelets.  Pt denies any bleeding, cp, sob, or recent falls.

## 2021-07-29 NOTE — H&P ADULT - PROBLEM SELECTOR PLAN 2
Recently diagnosed H. pylori infection after recent dyspepsia. She was prescribed protonix and antibiotics by [facility] and started taking it this Tuesday Recently diagnosed H. pylori infection after recent dyspepsia. She was prescribed protonix 40mg BID and clarithromycin 250mg BID and flagyl 500mg TID by [facility] and started taking it this Tuesday  - Recently diagnosed H. pylori infection after recent dyspepsia. She was prescribed protonix 40mg BID and clarithromycin 250mg BID and flagyl 500mg TID by [facility] and started taking it this Tuesday  - pt was on protonix during last admission  - will c/w protonix 40mg QD po  - will continue on clarithromycin and flagyl, less likely causing thrombocytopenia Recently diagnosed H. pylori infection after recent dyspepsia. She was prescribed protonix 40mg BID and clarithromycin 500mg BID and flagyl 500mg TID by [facility] and started taking it this Tuesday  - pt was on protonix during last admission  - will c/w protonix 40mg QD po  - will continue on clarithromycin and flagyl, less likely causing thrombocytopenia

## 2021-07-29 NOTE — ED PROVIDER NOTE - CLINICAL SUMMARY MEDICAL DECISION MAKING FREE TEXT BOX
Severe thrombocytopenia with platelets 2000.  New bruising over past few days but no other evidence of acute bleeding.  Will have hematology guide treatment, will admit.

## 2021-07-29 NOTE — H&P ADULT - PROBLEM SELECTOR PLAN 1
Recent diagnosis of ITP refractory to IVIG and steroids. Follows Dr. Fulton outpatient. Was sent to ED for severe thrombocytopenia of plt 2k on outpatient labs with new bruising on arms and legs. Recent admission and discharge here on 7/21 for severe thrombocytopenia s/p IVIG and steroids. s/p Rituximab 6/30. No active bleeding. ED provider spoke to heme/onc team and will start IVIG and decadron  - c/w IVIG  - c/w decadron 40mg po qd  - f/u heme/onc recs  - monitor for bleeding  - maintain active T/S Recent diagnosis of ITP refractory to IVIG and steroids. Follows Dr. Fulotn outpatient. Was sent to ED for severe thrombocytopenia of plt 2k on outpatient labs with new bruising on arms and legs. When discharged on 7/21, her plt were 20. Recent admission and discharge here on 7/21 for severe thrombocytopenia s/p IVIG and steroids. s/p Rituximab 6/30. No active bleeding. ED provider spoke to heme/onc team and will start IVIG and decadron.   - c/w IVIG  - c/w decadron 40mg po qd  - f/u heme/onc recs  - monitor for bleeding  - maintain active T/S  - SCD for DVT ppx, and holding heparin products in the setting of ITP Recent diagnosis of ITP refractory to IVIG and steroids. Follows Dr. Fulton outpatient. Was sent to ED for severe thrombocytopenia of plt 2k on outpatient labs with new bruising on arms and legs. When discharged on 7/21, her plt were 20. Recent admission and discharge here on 7/21 for severe thrombocytopenia s/p IVIG and steroids. s/p Rituximab 6/30. No active bleeding. ED provider spoke to heme/onc team and will start IVIG and decadron.   - c/w IVIG  - c/w decadron 40mg po qd  - mISS and protonix while on decadron  - f/u heme/onc recs  - monitor for bleeding  - maintain active T/S  - SCD for DVT ppx, and holding heparin products in the setting of ITP

## 2021-07-30 DIAGNOSIS — R63.8 OTHER SYMPTOMS AND SIGNS CONCERNING FOOD AND FLUID INTAKE: ICD-10-CM

## 2021-07-30 DIAGNOSIS — E03.9 HYPOTHYROIDISM, UNSPECIFIED: ICD-10-CM

## 2021-07-30 DIAGNOSIS — A04.8 OTHER SPECIFIED BACTERIAL INTESTINAL INFECTIONS: ICD-10-CM

## 2021-07-30 DIAGNOSIS — F32.9 MAJOR DEPRESSIVE DISORDER, SINGLE EPISODE, UNSPECIFIED: ICD-10-CM

## 2021-07-30 DIAGNOSIS — D69.6 THROMBOCYTOPENIA, UNSPECIFIED: ICD-10-CM

## 2021-07-30 LAB
ALBUMIN SERPL ELPH-MCNC: 3.3 G/DL — SIGNIFICANT CHANGE UP (ref 3.3–5)
ALP SERPL-CCNC: 71 U/L — SIGNIFICANT CHANGE UP (ref 40–120)
ALT FLD-CCNC: 32 U/L — SIGNIFICANT CHANGE UP (ref 10–45)
ANION GAP SERPL CALC-SCNC: 7 MMOL/L — SIGNIFICANT CHANGE UP (ref 5–17)
AST SERPL-CCNC: 39 U/L — SIGNIFICANT CHANGE UP (ref 10–40)
BASOPHILS # BLD AUTO: 0.01 K/UL — SIGNIFICANT CHANGE UP (ref 0–0.2)
BASOPHILS NFR BLD AUTO: 0.4 % — SIGNIFICANT CHANGE UP (ref 0–2)
BILIRUB SERPL-MCNC: 0.6 MG/DL — SIGNIFICANT CHANGE UP (ref 0.2–1.2)
BLD GP AB SCN SERPL QL: NEGATIVE — SIGNIFICANT CHANGE UP
BUN SERPL-MCNC: 15 MG/DL — SIGNIFICANT CHANGE UP (ref 7–23)
CALCIUM SERPL-MCNC: 8.8 MG/DL — SIGNIFICANT CHANGE UP (ref 8.4–10.5)
CHLORIDE SERPL-SCNC: 107 MMOL/L — SIGNIFICANT CHANGE UP (ref 96–108)
CO2 SERPL-SCNC: 21 MMOL/L — LOW (ref 22–31)
COVID-19 SPIKE DOMAIN AB INTERP: POSITIVE
COVID-19 SPIKE DOMAIN ANTIBODY RESULT: >250 U/ML — HIGH
CREAT SERPL-MCNC: 0.91 MG/DL — SIGNIFICANT CHANGE UP (ref 0.5–1.3)
EOSINOPHIL # BLD AUTO: 0 K/UL — SIGNIFICANT CHANGE UP (ref 0–0.5)
EOSINOPHIL NFR BLD AUTO: 0 % — SIGNIFICANT CHANGE UP (ref 0–6)
GLUCOSE BLDC GLUCOMTR-MCNC: 134 MG/DL — HIGH (ref 70–99)
GLUCOSE BLDC GLUCOMTR-MCNC: 149 MG/DL — HIGH (ref 70–99)
GLUCOSE BLDC GLUCOMTR-MCNC: 172 MG/DL — HIGH (ref 70–99)
GLUCOSE SERPL-MCNC: 136 MG/DL — HIGH (ref 70–99)
HAPTOGLOB SERPL-MCNC: 59 MG/DL — SIGNIFICANT CHANGE UP (ref 34–200)
HCT VFR BLD CALC: 35.2 % — SIGNIFICANT CHANGE UP (ref 34.5–45)
HGB BLD-MCNC: 11.6 G/DL — SIGNIFICANT CHANGE UP (ref 11.5–15.5)
IMM GRANULOCYTES NFR BLD AUTO: 0.8 % — SIGNIFICANT CHANGE UP (ref 0–1.5)
LDH SERPL L TO P-CCNC: 345 U/L — HIGH (ref 50–242)
LYMPHOCYTES # BLD AUTO: 0.7 K/UL — LOW (ref 1–3.3)
LYMPHOCYTES # BLD AUTO: 27 % — SIGNIFICANT CHANGE UP (ref 13–44)
MCHC RBC-ENTMCNC: 30.9 PG — SIGNIFICANT CHANGE UP (ref 27–34)
MCHC RBC-ENTMCNC: 33 GM/DL — SIGNIFICANT CHANGE UP (ref 32–36)
MCV RBC AUTO: 93.9 FL — SIGNIFICANT CHANGE UP (ref 80–100)
MONOCYTES # BLD AUTO: 0.06 K/UL — SIGNIFICANT CHANGE UP (ref 0–0.9)
MONOCYTES NFR BLD AUTO: 2.3 % — SIGNIFICANT CHANGE UP (ref 2–14)
NEUTROPHILS # BLD AUTO: 1.8 K/UL — SIGNIFICANT CHANGE UP (ref 1.8–7.4)
NEUTROPHILS NFR BLD AUTO: 69.5 % — SIGNIFICANT CHANGE UP (ref 43–77)
NRBC # BLD: 0 /100 WBCS — SIGNIFICANT CHANGE UP (ref 0–0)
PLATELET # BLD AUTO: 3 K/UL — CRITICAL LOW (ref 150–400)
POTASSIUM SERPL-MCNC: 4 MMOL/L — SIGNIFICANT CHANGE UP (ref 3.5–5.3)
POTASSIUM SERPL-SCNC: 4 MMOL/L — SIGNIFICANT CHANGE UP (ref 3.5–5.3)
PROT SERPL-MCNC: 9.1 G/DL — HIGH (ref 6–8.3)
RBC # BLD: 3.75 M/UL — LOW (ref 3.8–5.2)
RBC # FLD: 12.3 % — SIGNIFICANT CHANGE UP (ref 10.3–14.5)
RH IG SCN BLD-IMP: POSITIVE — SIGNIFICANT CHANGE UP
SARS-COV-2 IGG+IGM SERPL QL IA: >250 U/ML — HIGH
SARS-COV-2 IGG+IGM SERPL QL IA: POSITIVE
SODIUM SERPL-SCNC: 135 MMOL/L — SIGNIFICANT CHANGE UP (ref 135–145)
WBC # BLD: 2.59 K/UL — LOW (ref 3.8–10.5)
WBC # FLD AUTO: 2.59 K/UL — LOW (ref 3.8–10.5)

## 2021-07-30 PROCEDURE — 99233 SBSQ HOSP IP/OBS HIGH 50: CPT | Mod: GC

## 2021-07-30 RX ORDER — LEVOTHYROXINE SODIUM 125 MCG
125 TABLET ORAL
Refills: 0 | Status: DISCONTINUED | OUTPATIENT
Start: 2021-07-30 | End: 2021-07-30

## 2021-07-30 RX ORDER — CLARITHROMYCIN 500 MG
250 TABLET ORAL
Refills: 0 | Status: DISCONTINUED | OUTPATIENT
Start: 2021-07-30 | End: 2021-07-30

## 2021-07-30 RX ORDER — IMMUNE GLOBULIN (HUMAN) 10 G/100ML
65 INJECTION INTRAVENOUS; SUBCUTANEOUS ONCE
Refills: 0 | Status: COMPLETED | OUTPATIENT
Start: 2021-07-30 | End: 2021-07-30

## 2021-07-30 RX ORDER — DEXTROSE 50 % IN WATER 50 %
25 SYRINGE (ML) INTRAVENOUS ONCE
Refills: 0 | Status: DISCONTINUED | OUTPATIENT
Start: 2021-07-30 | End: 2021-08-01

## 2021-07-30 RX ORDER — METRONIDAZOLE 500 MG
500 TABLET ORAL EVERY 8 HOURS
Refills: 0 | Status: DISCONTINUED | OUTPATIENT
Start: 2021-07-30 | End: 2021-08-01

## 2021-07-30 RX ORDER — PANTOPRAZOLE SODIUM 20 MG/1
40 TABLET, DELAYED RELEASE ORAL
Refills: 0 | Status: DISCONTINUED | OUTPATIENT
Start: 2021-07-30 | End: 2021-08-01

## 2021-07-30 RX ORDER — GLUCAGON INJECTION, SOLUTION 0.5 MG/.1ML
1 INJECTION, SOLUTION SUBCUTANEOUS ONCE
Refills: 0 | Status: DISCONTINUED | OUTPATIENT
Start: 2021-07-30 | End: 2021-08-01

## 2021-07-30 RX ORDER — SODIUM CHLORIDE 9 MG/ML
1000 INJECTION, SOLUTION INTRAVENOUS
Refills: 0 | Status: DISCONTINUED | OUTPATIENT
Start: 2021-07-30 | End: 2021-08-01

## 2021-07-30 RX ORDER — DEXAMETHASONE 0.5 MG/5ML
40 ELIXIR ORAL DAILY
Refills: 0 | Status: DISCONTINUED | OUTPATIENT
Start: 2021-07-30 | End: 2021-07-30

## 2021-07-30 RX ORDER — DIPHENHYDRAMINE HCL 50 MG
50 CAPSULE ORAL ONCE
Refills: 0 | Status: COMPLETED | OUTPATIENT
Start: 2021-07-30 | End: 2021-07-30

## 2021-07-30 RX ORDER — LEVOTHYROXINE SODIUM 125 MCG
137 TABLET ORAL
Refills: 0 | Status: DISCONTINUED | OUTPATIENT
Start: 2021-07-30 | End: 2021-08-01

## 2021-07-30 RX ORDER — ACETAMINOPHEN 500 MG
650 TABLET ORAL ONCE
Refills: 0 | Status: COMPLETED | OUTPATIENT
Start: 2021-07-30 | End: 2021-07-30

## 2021-07-30 RX ORDER — DEXAMETHASONE 0.5 MG/5ML
40 ELIXIR ORAL EVERY 24 HOURS
Refills: 0 | Status: DISCONTINUED | OUTPATIENT
Start: 2021-07-30 | End: 2021-08-01

## 2021-07-30 RX ORDER — INSULIN LISPRO 100/ML
VIAL (ML) SUBCUTANEOUS
Refills: 0 | Status: DISCONTINUED | OUTPATIENT
Start: 2021-07-30 | End: 2021-08-01

## 2021-07-30 RX ORDER — DEXTROSE 50 % IN WATER 50 %
12.5 SYRINGE (ML) INTRAVENOUS ONCE
Refills: 0 | Status: DISCONTINUED | OUTPATIENT
Start: 2021-07-30 | End: 2021-08-01

## 2021-07-30 RX ORDER — SERTRALINE 25 MG/1
50 TABLET, FILM COATED ORAL DAILY
Refills: 0 | Status: DISCONTINUED | OUTPATIENT
Start: 2021-07-30 | End: 2021-08-01

## 2021-07-30 RX ORDER — CLARITHROMYCIN 500 MG
500 TABLET ORAL
Refills: 0 | Status: DISCONTINUED | OUTPATIENT
Start: 2021-07-30 | End: 2021-08-01

## 2021-07-30 RX ORDER — DEXTROSE 50 % IN WATER 50 %
15 SYRINGE (ML) INTRAVENOUS ONCE
Refills: 0 | Status: DISCONTINUED | OUTPATIENT
Start: 2021-07-30 | End: 2021-08-01

## 2021-07-30 RX ORDER — LEVOTHYROXINE SODIUM 125 MCG
137 TABLET ORAL
Refills: 0 | Status: DISCONTINUED | OUTPATIENT
Start: 2021-07-30 | End: 2021-07-30

## 2021-07-30 RX ADMIN — Medication 650 MILLIGRAM(S): at 22:25

## 2021-07-30 RX ADMIN — IMMUNE GLOBULIN (HUMAN) 162.5 GRAM(S): 10 INJECTION INTRAVENOUS; SUBCUTANEOUS at 22:45

## 2021-07-30 RX ADMIN — Medication 500 MILLIGRAM(S): at 09:01

## 2021-07-30 RX ADMIN — Medication 40 MILLIGRAM(S): at 19:19

## 2021-07-30 RX ADMIN — Medication 50 MILLIGRAM(S): at 21:47

## 2021-07-30 RX ADMIN — Medication 650 MILLIGRAM(S): at 21:46

## 2021-07-30 RX ADMIN — PANTOPRAZOLE SODIUM 40 MILLIGRAM(S): 20 TABLET, DELAYED RELEASE ORAL at 05:29

## 2021-07-30 RX ADMIN — Medication 500 MILLIGRAM(S): at 17:11

## 2021-07-30 RX ADMIN — SERTRALINE 50 MILLIGRAM(S): 25 TABLET, FILM COATED ORAL at 11:40

## 2021-07-30 RX ADMIN — Medication 250 MILLIGRAM(S): at 06:45

## 2021-07-30 RX ADMIN — Medication 500 MILLIGRAM(S): at 17:10

## 2021-07-30 RX ADMIN — Medication 137 MICROGRAM(S): at 05:29

## 2021-07-30 NOTE — PROGRESS NOTE ADULT - SUBJECTIVE AND OBJECTIVE BOX
Interval history:  Pt admitted again to the hospital for low plts due to ITP.  Thus far she has been treated with 2 rounds of steroids, 2 rounds of IVIG, and 1 dose of Rituxan which was administered on her last most recent hospitalization.  The plan was for her to receive further treatment in the office however an issue developed with her insurance and approval of rituxan and ivig.  On 7/29/21 she presented to the office for follow up and again found to have a plt count of 3.   Given no other option and unauthorized meds safest approach was to again treat her in the hospital.    MEDICATIONS  (STANDING):  dexAMETHasone     Tablet 40 milliGRAM(s) Oral every 24 hours  levothyroxine 137 MICROGram(s) Oral <User Schedule>  levothyroxine 125 MICROGram(s) Oral <User Schedule>  sertraline 50 milliGRAM(s) Oral daily    Allergies  azithromycin (Rash)  penicillin (Rash)  vancomycin (Rash)    Vital Signs Last 24 Hrs  T(C): 36.3 (30 Jul 2021 05:40), Max: 37.2 (29 Jul 2021 23:53)  T(F): 97.4 (30 Jul 2021 05:40), Max: 99 (29 Jul 2021 23:53)  HR: 79 (30 Jul 2021 05:40) (79 - 100)  BP: 122/82 (30 Jul 2021 05:40) (104/66 - 157/99)  BP(mean): --  RR: 17 (30 Jul 2021 05:40) (16 - 18)  SpO2: 96% (30 Jul 2021 05:40) (96% - 99%)    PHYSICAL EXAM:  Constitutional: NAD, well-groomed, well-developed  HEENT: PERRLA, EOMI, Normal Hearing, MMM  Neck: No LAD, No JVD  Back: Normal spine flexure, No CVA tenderness  Respiratory: CTAB  Cardiovascular: S1 and S2, RRR, no M/G/R  Gastrointestinal: BS+, soft, NT/ND  Extremities: No peripheral edema  Vascular: 2+ peripheral pulses  Neurological: A/O x 3, no focal deficits  Psychiatric: Normal mood, normal affect  Musculoskeletal: 5/5 strength b/l upper and lower extremities  Skin: Bruising    LABS: Reviewed.    RADIOLOGY & ADDITIONAL TESTS: Reviewed.

## 2021-07-30 NOTE — PROGRESS NOTE ADULT - SUBJECTIVE AND OBJECTIVE BOX
CC: Patient is a 61y old  Female who presents with a chief complaint of Severe thrombocytopenia (30 Jul 2021 08:02)    INTERVAL EVENTS: [place]    SUBJECTIVE / INTERVAL HPI: Patient seen and examined at bedside.  Pt states she feels well this morning.  She complains of minor pain over her ecchymoses on extremities that she is treating with icy-hot.   Also discussed further treatment options for ITP. Pt says she was unable to get second dose of Rituximab that was due 7/27 because of insurance reasons. She understands that splenectomy might be curative but has concerns about immune function if spleen is removed.  Patient denies headache, fever/chills, SOB, chest pain.     ROS: negative unless otherwise stated above.    VITAL SIGNS:  Vital Signs Last 24 Hrs  T(C): 36.3 (30 Jul 2021 05:40), Max: 37.2 (29 Jul 2021 23:53)  T(F): 97.4 (30 Jul 2021 05:40), Max: 99 (29 Jul 2021 23:53)  HR: 79 (30 Jul 2021 05:40) (79 - 100)  BP: 122/82 (30 Jul 2021 05:40) (104/66 - 157/99)  BP(mean): --  RR: 17 (30 Jul 2021 05:40) (16 - 18)  SpO2: 96% (30 Jul 2021 05:40) (96% - 99%)        PHYSICAL EXAM:    General: NAD, lying comfortably in bed, multiple exposed ecchymoses on extremities  HEENT: MMM  Neck: supple, no JVD  Cardiovascular: +S1/S2; RRR  Respiratory: CTA B/L; no W/R/R  Gastrointestinal: soft, NT/ND  Extremities: Scattered ecchymoses over upper and lower extremities with varying sizes, some tender to touch, in different states of healing. Otherwise WWP; no edema, clubbing or cyanosis  Vascular: 2+ radial, DP/PT pulses B/L  Neurological: AAOx3; no focal deficits    MEDICATIONS:  MEDICATIONS  (STANDING):  acetaminophen   Tablet .. 650 milliGRAM(s) Oral once  clarithromycin 500 milliGRAM(s) Oral two times a day  dexAMETHasone     Tablet 40 milliGRAM(s) Oral every 24 hours  dextrose 40% Gel 15 Gram(s) Oral once  dextrose 5%. 1000 milliLiter(s) (50 mL/Hr) IV Continuous <Continuous>  dextrose 5%. 1000 milliLiter(s) (100 mL/Hr) IV Continuous <Continuous>  dextrose 50% Injectable 25 Gram(s) IV Push once  dextrose 50% Injectable 12.5 Gram(s) IV Push once  dextrose 50% Injectable 25 Gram(s) IV Push once  diphenhydrAMINE   Injectable 50 milliGRAM(s) IV Push once  glucagon  Injectable 1 milliGRAM(s) IntraMuscular once  immune   globulin 10% (GAMMAGARD) IVPB 65 Gram(s) IV Intermittent once  insulin lispro (ADMELOG) corrective regimen sliding scale   SubCutaneous three times a day before meals  levothyroxine 137 MICROGram(s) Oral <User Schedule>  levothyroxine 125 MICROGram(s) Oral <User Schedule>  metroNIDAZOLE    Tablet 500 milliGRAM(s) Oral every 8 hours  pantoprazole    Tablet 40 milliGRAM(s) Oral before breakfast  sertraline 50 milliGRAM(s) Oral daily    MEDICATIONS  (PRN):      ALLERGIES:  Allergies    azithromycin (Rash)  penicillin (Rash)  vancomycin (Rash)    Intolerances        LABS:                        11.6   2.59  )-----------( 3        ( 30 Jul 2021 07:56 )             35.2     07-30    135  |  107  |  15  ----------------------------<  136<H>  4.0   |  21<L>  |  0.91    Ca    8.8      30 Jul 2021 07:56    TPro  9.1<H>  /  Alb  3.3  /  TBili  0.6  /  DBili  x   /  AST  39  /  ALT  32  /  AlkPhos  71  07-30    PT/INR - ( 29 Jul 2021 18:52 )   PT: 13.3 sec;   INR: 1.11          PTT - ( 29 Jul 2021 18:52 )  PTT:28.7 sec    CAPILLARY BLOOD GLUCOSE      POCT Blood Glucose.: 134 mg/dL (30 Jul 2021 08:33)      RADIOLOGY & ADDITIONAL TESTS: Reviewed.

## 2021-07-30 NOTE — PROGRESS NOTE ADULT - PROBLEM SELECTOR PLAN 1
Recent diagnosis of ITP refractory to IVIG and steroids. Follows Dr. Fulton outpatient, received first Rituximab dose 7/20  - IVIG 1g/kg today (2 day course)  - PO decadron 40mg (4 day course)  - mISS and protonix while on decadron  - Pt might require platelet transfusion, f/u heme/onc recs  - f/u whether 2nd dose Rituximab can be approved inpt  - monitor for bleeding  - maintain active T/S  - SCD for DVT ppx, and holding heparin products in the setting of ITP Recent diagnosis of ITP refractory to IVIG and steroids. Follows Dr. Fulton outpatient, received first Rituximab dose 7/20. Was scheduled for 2nd dose on 7/26 but there was a problem with insurance. First hospitalization in June patient got IVIGx2 and platelets, second hospitalization patient got IVIGx2, third hospitalization patient got IVIGx2 and decadron x4.   - 2nd IVIG 1g/kg today (2 day course)  - PO decadron 40mg (4 day course)  - mISS and protonix while on decadron  - Pt might require platelet transfusion or splenectomy, f/u heme/onc recs  - f/u whether 2nd dose Rituximab can be approved inpt  - monitor for bleeding  - maintain active T/S  - SCD for DVT ppx, and holding heparin products in the setting of ITP

## 2021-07-31 LAB
ALBUMIN SERPL ELPH-MCNC: 3 G/DL — LOW (ref 3.3–5)
ALP SERPL-CCNC: 63 U/L — SIGNIFICANT CHANGE UP (ref 40–120)
ALT FLD-CCNC: 28 U/L — SIGNIFICANT CHANGE UP (ref 10–45)
ANION GAP SERPL CALC-SCNC: 6 MMOL/L — SIGNIFICANT CHANGE UP (ref 5–17)
AST SERPL-CCNC: 35 U/L — SIGNIFICANT CHANGE UP (ref 10–40)
BASOPHILS # BLD AUTO: 0.01 K/UL — SIGNIFICANT CHANGE UP (ref 0–0.2)
BASOPHILS NFR BLD AUTO: 0.1 % — SIGNIFICANT CHANGE UP (ref 0–2)
BILIRUB SERPL-MCNC: 0.7 MG/DL — SIGNIFICANT CHANGE UP (ref 0.2–1.2)
BUN SERPL-MCNC: 21 MG/DL — SIGNIFICANT CHANGE UP (ref 7–23)
CALCIUM SERPL-MCNC: 8.7 MG/DL — SIGNIFICANT CHANGE UP (ref 8.4–10.5)
CHLORIDE SERPL-SCNC: 107 MMOL/L — SIGNIFICANT CHANGE UP (ref 96–108)
CO2 SERPL-SCNC: 21 MMOL/L — LOW (ref 22–31)
CREAT SERPL-MCNC: 0.84 MG/DL — SIGNIFICANT CHANGE UP (ref 0.5–1.3)
EOSINOPHIL # BLD AUTO: 0 K/UL — SIGNIFICANT CHANGE UP (ref 0–0.5)
EOSINOPHIL NFR BLD AUTO: 0 % — SIGNIFICANT CHANGE UP (ref 0–6)
FERRITIN SERPL-MCNC: 130 NG/ML — SIGNIFICANT CHANGE UP (ref 15–150)
GLUCOSE BLDC GLUCOMTR-MCNC: 123 MG/DL — HIGH (ref 70–99)
GLUCOSE BLDC GLUCOMTR-MCNC: 135 MG/DL — HIGH (ref 70–99)
GLUCOSE BLDC GLUCOMTR-MCNC: 142 MG/DL — HIGH (ref 70–99)
GLUCOSE BLDC GLUCOMTR-MCNC: 145 MG/DL — HIGH (ref 70–99)
GLUCOSE SERPL-MCNC: 134 MG/DL — HIGH (ref 70–99)
HCT VFR BLD CALC: 34.8 % — SIGNIFICANT CHANGE UP (ref 34.5–45)
HGB BLD-MCNC: 11.5 G/DL — SIGNIFICANT CHANGE UP (ref 11.5–15.5)
IMM GRANULOCYTES NFR BLD AUTO: 0.5 % — SIGNIFICANT CHANGE UP (ref 0–1.5)
IRON SATN MFR SERPL: 22 % — SIGNIFICANT CHANGE UP (ref 14–50)
IRON SATN MFR SERPL: 58 UG/DL — SIGNIFICANT CHANGE UP (ref 30–160)
LDH SERPL L TO P-CCNC: 340 U/L — HIGH (ref 50–242)
LYMPHOCYTES # BLD AUTO: 0.64 K/UL — LOW (ref 1–3.3)
LYMPHOCYTES # BLD AUTO: 7 % — LOW (ref 13–44)
MCHC RBC-ENTMCNC: 30.9 PG — SIGNIFICANT CHANGE UP (ref 27–34)
MCHC RBC-ENTMCNC: 33 GM/DL — SIGNIFICANT CHANGE UP (ref 32–36)
MCV RBC AUTO: 93.5 FL — SIGNIFICANT CHANGE UP (ref 80–100)
MONOCYTES # BLD AUTO: 0.12 K/UL — SIGNIFICANT CHANGE UP (ref 0–0.9)
MONOCYTES NFR BLD AUTO: 1.3 % — LOW (ref 2–14)
NEUTROPHILS # BLD AUTO: 8.28 K/UL — HIGH (ref 1.8–7.4)
NEUTROPHILS NFR BLD AUTO: 91.1 % — HIGH (ref 43–77)
NRBC # BLD: 0 /100 WBCS — SIGNIFICANT CHANGE UP (ref 0–0)
PLATELET # BLD AUTO: 26 K/UL — LOW (ref 150–400)
POTASSIUM SERPL-MCNC: 3.9 MMOL/L — SIGNIFICANT CHANGE UP (ref 3.5–5.3)
POTASSIUM SERPL-SCNC: 3.9 MMOL/L — SIGNIFICANT CHANGE UP (ref 3.5–5.3)
PROT SERPL-MCNC: 9.9 G/DL — HIGH (ref 6–8.3)
RBC # BLD: 3.72 M/UL — LOW (ref 3.8–5.2)
RBC # FLD: 12.3 % — SIGNIFICANT CHANGE UP (ref 10.3–14.5)
SODIUM SERPL-SCNC: 134 MMOL/L — LOW (ref 135–145)
TIBC SERPL-MCNC: 258 UG/DL — SIGNIFICANT CHANGE UP (ref 220–430)
TRANSFERRIN SERPL-MCNC: 213 MG/DL — SIGNIFICANT CHANGE UP (ref 200–360)
TSH SERPL-MCNC: 0.82 UIU/ML — SIGNIFICANT CHANGE UP (ref 0.27–4.2)
UIBC SERPL-MCNC: 200 UG/DL — SIGNIFICANT CHANGE UP (ref 110–370)
WBC # BLD: 9.1 K/UL — SIGNIFICANT CHANGE UP (ref 3.8–10.5)
WBC # FLD AUTO: 9.1 K/UL — SIGNIFICANT CHANGE UP (ref 3.8–10.5)

## 2021-07-31 PROCEDURE — 99232 SBSQ HOSP IP/OBS MODERATE 35: CPT | Mod: GC

## 2021-07-31 RX ADMIN — Medication 500 MILLIGRAM(S): at 01:08

## 2021-07-31 RX ADMIN — Medication 500 MILLIGRAM(S): at 08:35

## 2021-07-31 RX ADMIN — Medication 40 MILLIGRAM(S): at 19:38

## 2021-07-31 RX ADMIN — Medication 500 MILLIGRAM(S): at 17:52

## 2021-07-31 RX ADMIN — Medication 137 MICROGRAM(S): at 06:19

## 2021-07-31 RX ADMIN — SERTRALINE 50 MILLIGRAM(S): 25 TABLET, FILM COATED ORAL at 12:30

## 2021-07-31 RX ADMIN — Medication 500 MILLIGRAM(S): at 06:19

## 2021-07-31 RX ADMIN — PANTOPRAZOLE SODIUM 40 MILLIGRAM(S): 20 TABLET, DELAYED RELEASE ORAL at 06:19

## 2021-07-31 NOTE — PROGRESS NOTE ADULT - SUBJECTIVE AND OBJECTIVE BOX
INTERVAL HPI/OVERNIGHT EVENTS:  Patient was seen and examined at bedside. Overnight, patient's restless leg syndrome was acting up but patient did not want medication. No complaints at this time. Patient denies: fever, chills, dizziness, weakness, HA, Changes in vision, CP, palpitations, SOB, cough, N/V/D/C, dysuria, changes in bowel movements, LE edema. ROS otherwise negative.    VITAL SIGNS:  T(F): 98.2 (07-31-21 @ 12:00)  HR: 80 (07-31-21 @ 12:00)  BP: 120/80 (07-31-21 @ 12:00)  RR: 18 (07-31-21 @ 12:00)  SpO2: 93% (07-31-21 @ 12:00)  Wt(kg): --    PHYSICAL EXAM:    General: NAD, lying comfortably in bed, multiple exposed ecchymoses on extremities  HEENT: MMM  Neck: supple, no JVD  Cardiovascular: +S1/S2; RRR  Respiratory: CTA B/L; no W/R/R  Gastrointestinal: soft, NT/ND  Extremities: Scattered ecchymoses over upper and lower extremities with varying sizes, some tender to touch, in different states of healing. Otherwise WWP; no edema, clubbing or cyanosis  Vascular: 2+ radial, DP/PT pulses B/L  Neurological: AAOx3; no focal deficits    MEDICATIONS  (STANDING):  clarithromycin 500 milliGRAM(s) Oral two times a day  dexAMETHasone     Tablet 40 milliGRAM(s) Oral every 24 hours  dextrose 40% Gel 15 Gram(s) Oral once  dextrose 5%. 1000 milliLiter(s) (50 mL/Hr) IV Continuous <Continuous>  dextrose 5%. 1000 milliLiter(s) (100 mL/Hr) IV Continuous <Continuous>  dextrose 50% Injectable 25 Gram(s) IV Push once  dextrose 50% Injectable 12.5 Gram(s) IV Push once  dextrose 50% Injectable 25 Gram(s) IV Push once  glucagon  Injectable 1 milliGRAM(s) IntraMuscular once  insulin lispro (ADMELOG) corrective regimen sliding scale   SubCutaneous three times a day before meals  levothyroxine 137 MICROGram(s) Oral <User Schedule>  metroNIDAZOLE    Tablet 500 milliGRAM(s) Oral every 8 hours  pantoprazole    Tablet 40 milliGRAM(s) Oral before breakfast  sertraline 50 milliGRAM(s) Oral daily    MEDICATIONS  (PRN):      Allergies    azithromycin (Rash)  penicillin (Rash)  vancomycin (Rash)    Intolerances        LABS:                        11.5   9.10  )-----------( 26       ( 31 Jul 2021 08:49 )             34.8     07-31    134<L>  |  107  |  21  ----------------------------<  134<H>  3.9   |  21<L>  |  0.84    Ca    8.7      31 Jul 2021 08:49    TPro  9.9<H>  /  Alb  3.0<L>  /  TBili  0.7  /  DBili  x   /  AST  35  /  ALT  28  /  AlkPhos  63  07-31    PT/INR - ( 29 Jul 2021 18:52 )   PT: 13.3 sec;   INR: 1.11          PTT - ( 29 Jul 2021 18:52 )  PTT:28.7 sec      RADIOLOGY & ADDITIONAL TESTS:  Reviewed INTERVAL HPI/OVERNIGHT EVENTS:  Patient was seen and examined at bedside. Overnight, patient's restless leg syndrome was acting up but patient did not want medication. No complaints at this time. Patient denies: fever, chills, dizziness, weakness, HA, Changes in vision, CP, palpitations, SOB, cough, N/V/D/C, dysuria, changes in bowel movements, LE edema. ROS otherwise negative.     VITAL SIGNS:  T(F): 98.2 (07-31-21 @ 12:00)  HR: 80 (07-31-21 @ 12:00)  BP: 120/80 (07-31-21 @ 12:00)  RR: 18 (07-31-21 @ 12:00)  SpO2: 93% (07-31-21 @ 12:00)  Wt(kg): --    PHYSICAL EXAM:    General: NAD, lying comfortably in bed, multiple exposed ecchymoses on extremities  HEENT: MMM  Neck: supple, no JVD  Cardiovascular: +S1/S2; RRR  Respiratory: CTA B/L; no W/R/R  Gastrointestinal: soft, NT/ND  Extremities: Scattered ecchymoses over upper and lower extremities with varying sizes, some tender to touch, in different states of healing. Otherwise WWP; no edema, clubbing or cyanosis  Vascular: 2+ radial, DP/PT pulses B/L  Neurological: AAOx3; no focal deficits    MEDICATIONS  (STANDING):  clarithromycin 500 milliGRAM(s) Oral two times a day  dexAMETHasone     Tablet 40 milliGRAM(s) Oral every 24 hours  dextrose 40% Gel 15 Gram(s) Oral once  dextrose 5%. 1000 milliLiter(s) (50 mL/Hr) IV Continuous <Continuous>  dextrose 5%. 1000 milliLiter(s) (100 mL/Hr) IV Continuous <Continuous>  dextrose 50% Injectable 25 Gram(s) IV Push once  dextrose 50% Injectable 12.5 Gram(s) IV Push once  dextrose 50% Injectable 25 Gram(s) IV Push once  glucagon  Injectable 1 milliGRAM(s) IntraMuscular once  insulin lispro (ADMELOG) corrective regimen sliding scale   SubCutaneous three times a day before meals  levothyroxine 137 MICROGram(s) Oral <User Schedule>  metroNIDAZOLE    Tablet 500 milliGRAM(s) Oral every 8 hours  pantoprazole    Tablet 40 milliGRAM(s) Oral before breakfast  sertraline 50 milliGRAM(s) Oral daily    MEDICATIONS  (PRN):      Allergies    azithromycin (Rash)  penicillin (Rash)  vancomycin (Rash)    Intolerances        LABS:                        11.5   9.10  )-----------( 26       ( 31 Jul 2021 08:49 )             34.8     07-31    134<L>  |  107  |  21  ----------------------------<  134<H>  3.9   |  21<L>  |  0.84    Ca    8.7      31 Jul 2021 08:49    TPro  9.9<H>  /  Alb  3.0<L>  /  TBili  0.7  /  DBili  x   /  AST  35  /  ALT  28  /  AlkPhos  63  07-31    PT/INR - ( 29 Jul 2021 18:52 )   PT: 13.3 sec;   INR: 1.11          PTT - ( 29 Jul 2021 18:52 )  PTT:28.7 sec      RADIOLOGY & ADDITIONAL TESTS:  Reviewed

## 2021-07-31 NOTE — PROGRESS NOTE ADULT - ASSESSMENT
60 yo F, [place], with PMHx of ITP (on treatment with steroids/IVIG) hypothyroidism, hyperlipidemia, anxiety/depression, macular degeneration, bilateral knee osteoarthritis, was sent to ED for low platelets. Patient has had bruising on her arms/legs and petechiae. Seeing Hematology for ITP refractory to steroids.     ITP- Thus far she has been treated with 2 rounds of steroids, 2 rounds of IVIG, and 1 dose of Rituxan which was administered on her last most recent hospitalization.  The plan was for her to receive further treatment in the office however an issue developed with her insurance and approval of rituxan and ivig.  On 7/29/21 she presented to the office for follow up and again found to have a plt count of 3.   Given no other option and unauthorized meds safest approach was to again treat her in the hospital.    Spoke with ED last night on 7/29/21.  Plan is to give IVIG 1gram/kg daily for 2 days again along with decadron 40mg daily for 4 days.   She does have a delayed reponse to ivig- takes approx 2 days after ivig completion to see a response.    Thank you to all and will follow with you.  Mally Mercado MD for Dr. Fulton.  [phone]  
60 y/o woman with a PMHx of ITP (newly diagnosed and admitted 6/23) with recent discharge on 7/21 for severe thrombocytopenia with plt 2k, hypothyroidism, HLD, depression, arthritis, ITP (new diagnosis, initially admitted 6/23) sent to ED by outpatient heme/onc provider for platelets of 2k. Admitted for severe thrombocytopenia.
62 y/o woman with a PMHx of ITP (newly diagnosed and admitted 6/23) with recent discharge on 7/21 for severe thrombocytopenia with plt 2k, hypothyroidism, HLD, depression, arthritis, ITP (new diagnosis, initially admitted 6/23) sent to ED by outpatient heme/onc provider for platelets of 2k. Admitted for severe thrombocytopenia.

## 2021-07-31 NOTE — PROGRESS NOTE ADULT - PROBLEM SELECTOR PLAN 4
Pt with hx of moderate recurrent MDD, saw psychiatrist 10 yrs ago, PMD prescribes 50 mg Zoloft  -c/w 50 mg Zoloft QD
Pt with hx of moderate recurrent MDD, saw psychiatrist 10 yrs ago, PMD prescribes 50 mg Zoloft  -c/w 50 mg Zoloft QD

## 2021-07-31 NOTE — PROGRESS NOTE ADULT - PROBLEM SELECTOR PLAN 5
Plan:   F: none  E: replete K<4, Mg<2  N: Regular diet  VTE Prophylaxis: SCD  C: Full Code  D: F
Plan:   F: none  E: replete K<4, Mg<2  N: Regular diet  VTE Prophylaxis: SCD  C: Full Code  D: F

## 2021-07-31 NOTE — PROGRESS NOTE ADULT - PROBLEM SELECTOR PLAN 2
Pt started treatment with H. Pylori after seeing PMD on Monday.  - Protonix 40 mg QD  - Clarithymycin 500 mg BID  - Flagyl 500 mg TID
Pt started treatment with H. Pylori after seeing PMD on Monday.  - Protonix 40 mg QD  - Clarithymycin 500 mg BID  - Flagyl 500 mg TID

## 2021-07-31 NOTE — PROGRESS NOTE ADULT - PROBLEM SELECTOR PLAN 1
Recent diagnosis of ITP refractory to IVIG and steroids. Follows Dr. Fulton outpatient, received first Rituximab dose 7/20. Was scheduled for 2nd dose on 7/26 but there was a problem with insurance. Completed IVIG last night, platelets improved to 23 today. Dr VELAZQUEZ has been called multiple times today and yesterday with no response  -continue PO decadron 40mg (4 day course)  - mISS and protonix while on decadron  - Pt might require platelet transfusion or splenectomy, f/u heme/onc recs  - f/u whether 2nd dose Rituximab can be approved inpt  - monitor for bleeding  - maintain active T/S  - SCD for DVT ppx, and holding heparin products in the setting of ITP    First hospitalization in June patient got IVIGx2 and platelets, second hospitalization patient got IVIGx2, third hospitalization patient got IVIGx2 and decadron x4.

## 2021-07-31 NOTE — PROGRESS NOTE ADULT - PROBLEM SELECTOR PLAN 3
Hx of hypothyroidism, on levothyroxine   - C/w home regimen, levothyroxine 137 mcg qd M-F, 125 mcg qd sat/sunday
Hx of hypothyroidism, on levothyroxine. Patient had recent increase to 137 mcg daily  - increased dose to 137 mcg daily

## 2021-08-01 ENCOUNTER — TRANSCRIPTION ENCOUNTER (OUTPATIENT)
Age: 61
End: 2021-08-01

## 2021-08-01 VITALS
OXYGEN SATURATION: 96 % | DIASTOLIC BLOOD PRESSURE: 86 MMHG | RESPIRATION RATE: 18 BRPM | SYSTOLIC BLOOD PRESSURE: 132 MMHG | HEART RATE: 72 BPM | TEMPERATURE: 98 F

## 2021-08-01 LAB
ANION GAP SERPL CALC-SCNC: 7 MMOL/L — SIGNIFICANT CHANGE UP (ref 5–17)
BUN SERPL-MCNC: 17 MG/DL — SIGNIFICANT CHANGE UP (ref 7–23)
CALCIUM SERPL-MCNC: 8.5 MG/DL — SIGNIFICANT CHANGE UP (ref 8.4–10.5)
CHLORIDE SERPL-SCNC: 109 MMOL/L — HIGH (ref 96–108)
CO2 SERPL-SCNC: 22 MMOL/L — SIGNIFICANT CHANGE UP (ref 22–31)
CREAT SERPL-MCNC: 0.84 MG/DL — SIGNIFICANT CHANGE UP (ref 0.5–1.3)
GLUCOSE BLDC GLUCOMTR-MCNC: 134 MG/DL — HIGH (ref 70–99)
GLUCOSE BLDC GLUCOMTR-MCNC: 145 MG/DL — HIGH (ref 70–99)
GLUCOSE SERPL-MCNC: 127 MG/DL — HIGH (ref 70–99)
HCT VFR BLD CALC: 35.3 % — SIGNIFICANT CHANGE UP (ref 34.5–45)
HGB BLD-MCNC: 11.5 G/DL — SIGNIFICANT CHANGE UP (ref 11.5–15.5)
MAGNESIUM SERPL-MCNC: 2.4 MG/DL — SIGNIFICANT CHANGE UP (ref 1.6–2.6)
MCHC RBC-ENTMCNC: 31 PG — SIGNIFICANT CHANGE UP (ref 27–34)
MCHC RBC-ENTMCNC: 32.6 GM/DL — SIGNIFICANT CHANGE UP (ref 32–36)
MCV RBC AUTO: 95.1 FL — SIGNIFICANT CHANGE UP (ref 80–100)
NRBC # BLD: 0 /100 WBCS — SIGNIFICANT CHANGE UP (ref 0–0)
PHOSPHATE SERPL-MCNC: 3.5 MG/DL — SIGNIFICANT CHANGE UP (ref 2.5–4.5)
PLATELET # BLD AUTO: 46 K/UL — LOW (ref 150–400)
POTASSIUM SERPL-MCNC: 4 MMOL/L — SIGNIFICANT CHANGE UP (ref 3.5–5.3)
POTASSIUM SERPL-SCNC: 4 MMOL/L — SIGNIFICANT CHANGE UP (ref 3.5–5.3)
RBC # BLD: 3.71 M/UL — LOW (ref 3.8–5.2)
RBC # FLD: 12.5 % — SIGNIFICANT CHANGE UP (ref 10.3–14.5)
SODIUM SERPL-SCNC: 138 MMOL/L — SIGNIFICANT CHANGE UP (ref 135–145)
WBC # BLD: 6.92 K/UL — SIGNIFICANT CHANGE UP (ref 3.8–10.5)
WBC # FLD AUTO: 6.92 K/UL — SIGNIFICANT CHANGE UP (ref 3.8–10.5)

## 2021-08-01 PROCEDURE — 83615 LACTATE (LD) (LDH) ENZYME: CPT

## 2021-08-01 PROCEDURE — 85730 THROMBOPLASTIN TIME PARTIAL: CPT

## 2021-08-01 PROCEDURE — 86850 RBC ANTIBODY SCREEN: CPT

## 2021-08-01 PROCEDURE — 36415 COLL VENOUS BLD VENIPUNCTURE: CPT

## 2021-08-01 PROCEDURE — 83735 ASSAY OF MAGNESIUM: CPT

## 2021-08-01 PROCEDURE — 82962 GLUCOSE BLOOD TEST: CPT

## 2021-08-01 PROCEDURE — 99285 EMERGENCY DEPT VISIT HI MDM: CPT

## 2021-08-01 PROCEDURE — 85610 PROTHROMBIN TIME: CPT

## 2021-08-01 PROCEDURE — 84443 ASSAY THYROID STIM HORMONE: CPT

## 2021-08-01 PROCEDURE — 85027 COMPLETE CBC AUTOMATED: CPT

## 2021-08-01 PROCEDURE — 86769 SARS-COV-2 COVID-19 ANTIBODY: CPT

## 2021-08-01 PROCEDURE — 83540 ASSAY OF IRON: CPT

## 2021-08-01 PROCEDURE — 84466 ASSAY OF TRANSFERRIN: CPT

## 2021-08-01 PROCEDURE — 83010 ASSAY OF HAPTOGLOBIN QUANT: CPT

## 2021-08-01 PROCEDURE — 86900 BLOOD TYPING SEROLOGIC ABO: CPT

## 2021-08-01 PROCEDURE — 96374 THER/PROPH/DIAG INJ IV PUSH: CPT

## 2021-08-01 PROCEDURE — 82728 ASSAY OF FERRITIN: CPT

## 2021-08-01 PROCEDURE — 83550 IRON BINDING TEST: CPT

## 2021-08-01 PROCEDURE — 87635 SARS-COV-2 COVID-19 AMP PRB: CPT

## 2021-08-01 PROCEDURE — 86901 BLOOD TYPING SEROLOGIC RH(D): CPT

## 2021-08-01 PROCEDURE — 84100 ASSAY OF PHOSPHORUS: CPT

## 2021-08-01 PROCEDURE — 80048 BASIC METABOLIC PNL TOTAL CA: CPT

## 2021-08-01 PROCEDURE — 80053 COMPREHEN METABOLIC PANEL: CPT

## 2021-08-01 PROCEDURE — 85025 COMPLETE CBC W/AUTO DIFF WBC: CPT

## 2021-08-01 RX ORDER — PANTOPRAZOLE SODIUM 20 MG/1
1 TABLET, DELAYED RELEASE ORAL
Qty: 30 | Refills: 0
Start: 2021-08-01 | End: 2021-08-30

## 2021-08-01 RX ORDER — PANTOPRAZOLE SODIUM 20 MG/1
1 TABLET, DELAYED RELEASE ORAL
Qty: 22 | Refills: 0
Start: 2021-08-01 | End: 2021-08-11

## 2021-08-01 RX ORDER — PANTOPRAZOLE SODIUM 20 MG/1
1 TABLET, DELAYED RELEASE ORAL
Qty: 0 | Refills: 0 | DISCHARGE
Start: 2021-08-01

## 2021-08-01 RX ORDER — CLARITHROMYCIN 500 MG
1 TABLET ORAL
Qty: 0 | Refills: 0 | DISCHARGE
Start: 2021-08-01

## 2021-08-01 RX ORDER — METRONIDAZOLE 500 MG
1 TABLET ORAL
Qty: 0 | Refills: 0 | DISCHARGE
Start: 2021-08-01

## 2021-08-01 RX ADMIN — Medication 500 MILLIGRAM(S): at 09:09

## 2021-08-01 RX ADMIN — SERTRALINE 50 MILLIGRAM(S): 25 TABLET, FILM COATED ORAL at 11:49

## 2021-08-01 RX ADMIN — Medication 500 MILLIGRAM(S): at 06:37

## 2021-08-01 RX ADMIN — PANTOPRAZOLE SODIUM 40 MILLIGRAM(S): 20 TABLET, DELAYED RELEASE ORAL at 06:38

## 2021-08-01 RX ADMIN — Medication 137 MICROGRAM(S): at 06:37

## 2021-08-01 RX ADMIN — Medication 500 MILLIGRAM(S): at 00:43

## 2021-08-01 NOTE — DISCHARGE NOTE PROVIDER - NSDCMRMEDTOKEN_GEN_ALL_CORE_FT
levothyroxine 125 mcg (0.125 mg) oral tablet: 1 tab(s) orally Saturday and Sunday  levothyroxine 137 mcg (0.137 mg) oral capsule: 1 cap(s) orally 5 times a week    Monday -Friday  sertraline 50 mg oral tablet: 1 tab(s) orally once a day   clarithromycin 500 mg oral tablet: 1 tab(s) orally 2 times a day  levothyroxine 125 mcg (0.125 mg) oral tablet: 1 tab(s) orally Saturday and Sunday  levothyroxine 137 mcg (0.137 mg) oral capsule: 1 cap(s) orally 5 times a week    Monday -Friday  metroNIDAZOLE 500 mg oral tablet: 1 tab(s) orally every 8 hours  pantoprazole 40 mg oral delayed release tablet: 1 tab(s) orally every 12 hours   sertraline 50 mg oral tablet: 1 tab(s) orally once a day

## 2021-08-01 NOTE — DISCHARGE NOTE PROVIDER - NSDCCPCAREPLAN_GEN_ALL_CORE_FT
PRINCIPAL DISCHARGE DIAGNOSIS  Diagnosis: Severe thrombocytopenia  Assessment and Plan of Treatment: You were found to have a platelet level of 2000. You were given IVIG and steroids which helped your platelets go back to 46,000. Please follow up with your oncologist and Rheumatologist for further care. Please take pantoprazole 40mg once a day to help avoid stomach ulcers. If you experience any worsening bleeding in your skin, urine, stool, wosening headache or abdominal pain, please return to the emergency room.      SECONDARY DISCHARGE DIAGNOSES  Diagnosis: Positive H. pylori test  Assessment and Plan of Treatment: You were recently diagnosed with H. Pylori infection. Please take  prescribed protonix 40mg twice a day, clarithromycin 500mg twice a day, and flagyl 500mg three times a day until 8/12/21 to complete 2 weeks total. Please follow up with your primary care at [facility] on [address].

## 2021-08-01 NOTE — DISCHARGE NOTE PROVIDER - NSDCFUSCHEDAPPT_GEN_ALL_CORE_FT
DREW ZAMORA ; 08/30/2021 ; [facility] [address]  DREW ZAMORA ; 09/09/2021 ; [facility] [address]

## 2021-08-01 NOTE — DISCHARGE NOTE NURSING/CASE MANAGEMENT/SOCIAL WORK - PATIENT PORTAL LINK FT
You can access the FollowMyHealth Patient Portal offered by [facility] by registering at the following website: [email or web address]. By joining [facility]’s FollowMyHealth portal, you will also be able to view your health information using other applications (apps) compatible with our system.

## 2021-08-01 NOTE — DISCHARGE NOTE PROVIDER - CARE PROVIDER_API CALL
Jt Fulton)  Hematology; Medical Oncology  [address], [address]  [place] [address]  Phone: [phone]  Fax: [phone]  Follow Up Time:

## 2021-08-01 NOTE — DISCHARGE NOTE PROVIDER - HOSPITAL COURSE
#Discharge: do not delete     62 y/o woman with a PMHx of ITP (newly diagnosed and admitted 6/23) with recent discharge on 7/21 for severe thrombocytopenia with plt 2k, hypothyroidism, HLD, depression, arthritis, ITP (new diagnosis, initially admitted 6/23) sent to ED by outpatient heme/onc provider for platelets of 2k. Admitted for severe thrombocytopenia.    Hospital course (by problem):     #Thrombocytopenia:  Recent diagnosis of ITP refractory to IVIG and steroids. Follows Dr. Fulton outpatient, received first Rituximab dose 7/20. Was scheduled for 2nd dose on 7/26 but there was a problem with insurance. First hospitalization in June patient got IVIGx2 and platelets, second hospitalization patient got IVIGx2, third hospitalization patient got IVIGx2 and decadron x4. On exam, multiple petechiae on body, no other signs of bleeding.  -Day of discharge PLT 46K  -f/u w/ Dr. Mann     #Exertional shortness of breath: Has had CAD workup neg outpt   -TSH WNL     -can obtain baseline TTE outpt    #Arthritis: Patient has a know history of chronic joint stiffness of entire body including back and knees. She follows were her outpatient rheumatologist Dr. Daly for arthritis in setting of thrombocytopenia. Her pertinent labs are as follows: ERIN 1:80 on 6/24, Anti-dsDNA, anti-RNP, anti-Smith, rheumatoid factor negative 6/24. We held the patients home colchicine as there are cases reporting thrombocytopenia. She will follow up with Dr. Daly.     #Hypothyroidism: Patient has a known history of hypothyroidism. She takes levothyroxine 137mcg 5 times a week and 125mcg 2 times a week (weekends) at home. We continued her home Levothyroxine regimen.      #Depression: Patient has a known medical history of depression. We continued her home medication of Zoloft 50mg Q24.     #HLD (hyperlipidemia): Patient has a know medical history of HLD.  She takes Ezetimibe 10mg qD at home. We held the therapeutic interchange with statins as patient does not tolerate statins.    Patient was discharged to: Home    New medications:   -None    Changes to old medications:  Please hold off on taking colchicine and Ezetimibe as there is research showing these medications can be associated with ITP.     Medications that were stopped:  None    Items to follow up as outpatient:  CBC, CMP, PT, PTT, INR  Please follow up with your PCP and rheumatologist outpatient     Physical exam at the time of discharge:  PHYSICAL EXAM:  General: NAD  HEENT: MMM, ulcer on roof of mouth improving  Neck: supple  Cardiovascular: +S1/S2; RRR  Respiratory: CTA B/L; no W/R/R  Gastrointestinal: soft, NT/ND  Extremities: WWP; no edema, clubbing. Diffuse purpura on both UE. Petechiae and 2 purpura on abdomen, purpura and ecchymosis on lower extremities, pretibial nodule right LE.  Vascular: 2+ radial, DP/PT pulses B/L  Neurological: AAOx3; no focal deficits   #Discharge: do not delete     62 y/o woman with a PMHx of ITP (newly diagnosed and admitted 6/23) with recent discharge on 7/21 for severe thrombocytopenia with plt 2k, hypothyroidism, HLD, depression, arthritis, ITP (new diagnosis, initially admitted 6/23) sent to ED by outpatient heme/onc provider for platelets of 2k. Admitted for severe thrombocytopenia.    Hospital course (by problem):     #Thrombocytopenia:  Recent diagnosis of ITP refractory to IVIG and steroids. Follows Dr. Fulton outpatient, received first Rituximab dose 7/20. Was scheduled for 2nd dose on 7/26 but there was a problem with insurance. First hospitalization in June patient got IVIGx2 and platelets, second hospitalization patient got IVIGx2, third hospitalization patient got IVIGx2 and decadron x4. On exam, multiple petechiae on body, no other signs of bleeding.  -Day of discharge PLT 46K  -f/u w/ Dr. Mann     #Exertional shortness of breath: Has had CAD workup neg outpt   -TSH WNL     -can obtain baseline TTE outpt    #H. Pylori: Recently diagnosed H. pylori infection after recent dyspepsia 7/29. She was prescribed protonix 40mg BID and clarithromycin 500mg BID and flagyl 500mg TID by [facility].  -c/w Triple therapy BID for 2 weeks duration (END: 8/12/21)    #Arthritis: Patient has a know history of chronic joint stiffness of entire body including back and knees. She follows were her outpatient rheumatologist Dr. Daly for arthritis in setting of thrombocytopenia. Her pertinent labs are as follows: ERIN 1:80 on 6/24, Anti-dsDNA, anti-RNP, anti-Smith, rheumatoid factor negative 6/24. We held the patients home colchicine as there are cases reporting thrombocytopenia. She will follow up with Dr. Daly.     #Hypothyroidism: Patient has a known history of hypothyroidism. She takes levothyroxine 137mcg 5 times a week and 125mcg 2 times a week (weekends) at home. We continued her home Levothyroxine regimen.      #Depression: Patient has a known medical history of depression. We continued her home medication of Zoloft 50mg Q24.     #HLD (hyperlipidemia): Patient has a know medical history of HLD.  She takes Ezetimibe 10mg qD at home. We held the therapeutic interchange with statins as patient does not tolerate statins.    Patient was discharged to: Home    New medications:   -None    Changes to old medications:  Please hold off on taking colchicine and Ezetimibe as there is research showing these medications can be associated with ITP.     Medications that were stopped:  None    Items to follow up as outpatient:  CBC, CMP, PLT, PTT, INR  Please follow up with your PCP and rheumatologist outpatient     Physical exam at the time of discharge:  PHYSICAL EXAM:  General: NAD  HEENT: MMM   Neck: supple  Cardiovascular: +S1/S2; RRR  Respiratory: CTA B/L; no W/R/R  Gastrointestinal: soft, NT/ND  Extremities: Diffuse Petechiae on abdomen, arms, and legs  Vascular: 2+ radial   Neurological: AAOx3; no focal deficits

## 2021-08-03 ENCOUNTER — NON-APPOINTMENT (OUTPATIENT)
Age: 61
End: 2021-08-03

## 2021-08-05 DIAGNOSIS — D69.3 IMMUNE THROMBOCYTOPENIC PURPURA: ICD-10-CM

## 2021-08-05 DIAGNOSIS — Z88.1 ALLERGY STATUS TO OTHER ANTIBIOTIC AGENTS STATUS: ICD-10-CM

## 2021-08-05 DIAGNOSIS — M19.90 UNSPECIFIED OSTEOARTHRITIS, UNSPECIFIED SITE: ICD-10-CM

## 2021-08-05 DIAGNOSIS — A04.8 OTHER SPECIFIED BACTERIAL INTESTINAL INFECTIONS: ICD-10-CM

## 2021-08-05 DIAGNOSIS — Z88.0 ALLERGY STATUS TO PENICILLIN: ICD-10-CM

## 2021-08-05 DIAGNOSIS — F32.9 MAJOR DEPRESSIVE DISORDER, SINGLE EPISODE, UNSPECIFIED: ICD-10-CM

## 2021-08-05 DIAGNOSIS — E78.5 HYPERLIPIDEMIA, UNSPECIFIED: ICD-10-CM

## 2021-08-05 DIAGNOSIS — E03.9 HYPOTHYROIDISM, UNSPECIFIED: ICD-10-CM

## 2021-08-10 ENCOUNTER — NON-APPOINTMENT (OUTPATIENT)
Age: 61
End: 2021-08-10

## 2021-08-12 ENCOUNTER — INPATIENT (INPATIENT)
Facility: HOSPITAL | Age: 61
LOS: 0 days | Discharge: HOME | DRG: 813 | End: 2021-08-13
Attending: STUDENT IN AN ORGANIZED HEALTH CARE EDUCATION/TRAINING PROGRAM
Payer: COMMERCIAL

## 2021-08-12 VITALS
SYSTOLIC BLOOD PRESSURE: 143 MMHG | OXYGEN SATURATION: 97 % | RESPIRATION RATE: 17 BRPM | HEIGHT: 63 IN | TEMPERATURE: 99 F | HEART RATE: 59 BPM | DIASTOLIC BLOOD PRESSURE: 90 MMHG

## 2021-08-12 DIAGNOSIS — K29.70 GASTRITIS, UNSPECIFIED, WITHOUT BLEEDING: ICD-10-CM

## 2021-08-12 DIAGNOSIS — M19.90 UNSPECIFIED OSTEOARTHRITIS, UNSPECIFIED SITE: ICD-10-CM

## 2021-08-12 DIAGNOSIS — R63.8 OTHER SYMPTOMS AND SIGNS CONCERNING FOOD AND FLUID INTAKE: ICD-10-CM

## 2021-08-12 DIAGNOSIS — D69.3 IMMUNE THROMBOCYTOPENIC PURPURA: ICD-10-CM

## 2021-08-12 DIAGNOSIS — E78.5 HYPERLIPIDEMIA, UNSPECIFIED: ICD-10-CM

## 2021-08-12 DIAGNOSIS — E03.9 HYPOTHYROIDISM, UNSPECIFIED: ICD-10-CM

## 2021-08-12 DIAGNOSIS — F32.9 MAJOR DEPRESSIVE DISORDER, SINGLE EPISODE, UNSPECIFIED: ICD-10-CM

## 2021-08-12 LAB
ALBUMIN SERPL ELPH-MCNC: 3.4 G/DL — SIGNIFICANT CHANGE UP (ref 3.3–5)
ALP SERPL-CCNC: 76 U/L — SIGNIFICANT CHANGE UP (ref 40–120)
ALT FLD-CCNC: 44 U/L — SIGNIFICANT CHANGE UP (ref 10–45)
ANION GAP SERPL CALC-SCNC: 9 MMOL/L — SIGNIFICANT CHANGE UP (ref 5–17)
ANISOCYTOSIS BLD QL: SLIGHT — SIGNIFICANT CHANGE UP
APTT BLD: 24.7 SEC — LOW (ref 27.5–35.5)
AST SERPL-CCNC: 53 U/L — HIGH (ref 10–40)
BASOPHILS # BLD AUTO: 0.01 K/UL — SIGNIFICANT CHANGE UP (ref 0–0.2)
BASOPHILS NFR BLD AUTO: 0.4 % — SIGNIFICANT CHANGE UP (ref 0–2)
BILIRUB SERPL-MCNC: 0.4 MG/DL — SIGNIFICANT CHANGE UP (ref 0.2–1.2)
BLD GP AB SCN SERPL QL: NEGATIVE — SIGNIFICANT CHANGE UP
BUN SERPL-MCNC: 16 MG/DL — SIGNIFICANT CHANGE UP (ref 7–23)
CALCIUM SERPL-MCNC: 9.1 MG/DL — SIGNIFICANT CHANGE UP (ref 8.4–10.5)
CHLORIDE SERPL-SCNC: 106 MMOL/L — SIGNIFICANT CHANGE UP (ref 96–108)
CO2 SERPL-SCNC: 20 MMOL/L — LOW (ref 22–31)
CREAT SERPL-MCNC: 0.93 MG/DL — SIGNIFICANT CHANGE UP (ref 0.5–1.3)
EOSINOPHIL # BLD AUTO: 0 K/UL — SIGNIFICANT CHANGE UP (ref 0–0.5)
EOSINOPHIL NFR BLD AUTO: 0 % — SIGNIFICANT CHANGE UP (ref 0–6)
GLUCOSE BLDC GLUCOMTR-MCNC: 100 MG/DL — HIGH (ref 70–99)
GLUCOSE BLDC GLUCOMTR-MCNC: 157 MG/DL — HIGH (ref 70–99)
GLUCOSE BLDC GLUCOMTR-MCNC: 159 MG/DL — HIGH (ref 70–99)
GLUCOSE SERPL-MCNC: 126 MG/DL — HIGH (ref 70–99)
HCT VFR BLD CALC: 36.9 % — SIGNIFICANT CHANGE UP (ref 34.5–45)
HGB BLD-MCNC: 12.4 G/DL — SIGNIFICANT CHANGE UP (ref 11.5–15.5)
IMM GRANULOCYTES NFR BLD AUTO: 0.4 % — SIGNIFICANT CHANGE UP (ref 0–1.5)
INR BLD: 1.01 — SIGNIFICANT CHANGE UP (ref 0.88–1.16)
LYMPHOCYTES # BLD AUTO: 0.5 K/UL — LOW (ref 1–3.3)
LYMPHOCYTES # BLD AUTO: 19.7 % — SIGNIFICANT CHANGE UP (ref 13–44)
LYMPHOCYTES # BLD AUTO: 24 % — SIGNIFICANT CHANGE UP (ref 13–44)
MACROCYTES BLD QL: SLIGHT — SIGNIFICANT CHANGE UP
MANUAL SMEAR VERIFICATION: SIGNIFICANT CHANGE UP
MCHC RBC-ENTMCNC: 31.2 PG — SIGNIFICANT CHANGE UP (ref 27–34)
MCHC RBC-ENTMCNC: 33.6 GM/DL — SIGNIFICANT CHANGE UP (ref 32–36)
MCV RBC AUTO: 92.9 FL — SIGNIFICANT CHANGE UP (ref 80–100)
MONOCYTES # BLD AUTO: 0.16 K/UL — SIGNIFICANT CHANGE UP (ref 0–0.9)
MONOCYTES NFR BLD AUTO: 4 % — SIGNIFICANT CHANGE UP (ref 2–14)
MONOCYTES NFR BLD AUTO: 6.3 % — SIGNIFICANT CHANGE UP (ref 2–14)
NEUTROPHILS # BLD AUTO: 1.86 K/UL — SIGNIFICANT CHANGE UP (ref 1.8–7.4)
NEUTROPHILS NFR BLD AUTO: 72 % — SIGNIFICANT CHANGE UP (ref 43–77)
NEUTROPHILS NFR BLD AUTO: 73.2 % — SIGNIFICANT CHANGE UP (ref 43–77)
NRBC # BLD: 0 /100 WBCS — SIGNIFICANT CHANGE UP (ref 0–0)
OVALOCYTES BLD QL SMEAR: SLIGHT — SIGNIFICANT CHANGE UP
PLAT MORPH BLD: ABNORMAL
PLATELET # BLD AUTO: 5 K/UL — CRITICAL LOW (ref 150–400)
POLYCHROMASIA BLD QL SMEAR: SLIGHT — SIGNIFICANT CHANGE UP
POTASSIUM SERPL-MCNC: 4.4 MMOL/L — SIGNIFICANT CHANGE UP (ref 3.5–5.3)
POTASSIUM SERPL-SCNC: 4.4 MMOL/L — SIGNIFICANT CHANGE UP (ref 3.5–5.3)
PROT SERPL-MCNC: 7.9 G/DL — SIGNIFICANT CHANGE UP (ref 6–8.3)
PROTHROM AB SERPL-ACNC: 12.1 SEC — SIGNIFICANT CHANGE UP (ref 10.6–13.6)
RBC # BLD: 3.97 M/UL — SIGNIFICANT CHANGE UP (ref 3.8–5.2)
RBC # FLD: 12.7 % — SIGNIFICANT CHANGE UP (ref 10.3–14.5)
RBC BLD AUTO: ABNORMAL
RH IG SCN BLD-IMP: POSITIVE — SIGNIFICANT CHANGE UP
SARS-COV-2 RNA SPEC QL NAA+PROBE: SIGNIFICANT CHANGE UP
SODIUM SERPL-SCNC: 135 MMOL/L — SIGNIFICANT CHANGE UP (ref 135–145)
SPHEROCYTES BLD QL SMEAR: SLIGHT — SIGNIFICANT CHANGE UP
WBC # BLD: 2.54 K/UL — LOW (ref 3.8–10.5)
WBC # FLD AUTO: 2.54 K/UL — LOW (ref 3.8–10.5)

## 2021-08-12 PROCEDURE — 99223 1ST HOSP IP/OBS HIGH 75: CPT | Mod: GC

## 2021-08-12 PROCEDURE — 99285 EMERGENCY DEPT VISIT HI MDM: CPT

## 2021-08-12 PROCEDURE — 93010 ELECTROCARDIOGRAM REPORT: CPT

## 2021-08-12 PROCEDURE — 71045 X-RAY EXAM CHEST 1 VIEW: CPT | Mod: 26

## 2021-08-12 RX ORDER — DEXAMETHASONE 0.5 MG/5ML
40 ELIXIR ORAL EVERY 24 HOURS
Refills: 0 | Status: DISCONTINUED | OUTPATIENT
Start: 2021-08-12 | End: 2021-08-13

## 2021-08-12 RX ORDER — INSULIN LISPRO 100/ML
VIAL (ML) SUBCUTANEOUS
Refills: 0 | Status: DISCONTINUED | OUTPATIENT
Start: 2021-08-12 | End: 2021-08-13

## 2021-08-12 RX ORDER — LEVOTHYROXINE SODIUM 125 MCG
1 TABLET ORAL
Qty: 0 | Refills: 0 | DISCHARGE

## 2021-08-12 RX ORDER — SODIUM CHLORIDE 9 MG/ML
1000 INJECTION, SOLUTION INTRAVENOUS
Refills: 0 | Status: DISCONTINUED | OUTPATIENT
Start: 2021-08-12 | End: 2021-08-13

## 2021-08-12 RX ORDER — DEXAMETHASONE 0.5 MG/5ML
40 ELIXIR ORAL EVERY 24 HOURS
Refills: 0 | Status: DISCONTINUED | OUTPATIENT
Start: 2021-08-12 | End: 2021-08-12

## 2021-08-12 RX ORDER — PANTOPRAZOLE SODIUM 20 MG/1
40 TABLET, DELAYED RELEASE ORAL EVERY 12 HOURS
Refills: 0 | Status: DISCONTINUED | OUTPATIENT
Start: 2021-08-12 | End: 2021-08-13

## 2021-08-12 RX ORDER — CLARITHROMYCIN 500 MG
500 TABLET ORAL EVERY 12 HOURS
Refills: 0 | Status: DISCONTINUED | OUTPATIENT
Start: 2021-08-12 | End: 2021-08-13

## 2021-08-12 RX ORDER — DEXTROSE 50 % IN WATER 50 %
25 SYRINGE (ML) INTRAVENOUS ONCE
Refills: 0 | Status: DISCONTINUED | OUTPATIENT
Start: 2021-08-12 | End: 2021-08-13

## 2021-08-12 RX ORDER — DEXTROSE 50 % IN WATER 50 %
12.5 SYRINGE (ML) INTRAVENOUS ONCE
Refills: 0 | Status: DISCONTINUED | OUTPATIENT
Start: 2021-08-12 | End: 2021-08-13

## 2021-08-12 RX ORDER — DIPHENHYDRAMINE HCL 50 MG
25 CAPSULE ORAL EVERY 24 HOURS
Refills: 0 | Status: COMPLETED | OUTPATIENT
Start: 2021-08-12 | End: 2021-08-13

## 2021-08-12 RX ORDER — DEXTROSE 50 % IN WATER 50 %
15 SYRINGE (ML) INTRAVENOUS ONCE
Refills: 0 | Status: DISCONTINUED | OUTPATIENT
Start: 2021-08-12 | End: 2021-08-13

## 2021-08-12 RX ORDER — ACETAMINOPHEN 500 MG
650 TABLET ORAL EVERY 24 HOURS
Refills: 0 | Status: COMPLETED | OUTPATIENT
Start: 2021-08-12 | End: 2021-08-13

## 2021-08-12 RX ORDER — IMMUNE GLOBULIN (HUMAN) 10 G/100ML
65 INJECTION INTRAVENOUS; SUBCUTANEOUS EVERY 24 HOURS
Refills: 0 | Status: COMPLETED | OUTPATIENT
Start: 2021-08-12 | End: 2021-08-13

## 2021-08-12 RX ORDER — SERTRALINE 25 MG/1
50 TABLET, FILM COATED ORAL DAILY
Refills: 0 | Status: DISCONTINUED | OUTPATIENT
Start: 2021-08-12 | End: 2021-08-13

## 2021-08-12 RX ORDER — METRONIDAZOLE 500 MG
500 TABLET ORAL EVERY 12 HOURS
Refills: 0 | Status: DISCONTINUED | OUTPATIENT
Start: 2021-08-12 | End: 2021-08-13

## 2021-08-12 RX ORDER — GLUCAGON INJECTION, SOLUTION 0.5 MG/.1ML
1 INJECTION, SOLUTION SUBCUTANEOUS ONCE
Refills: 0 | Status: DISCONTINUED | OUTPATIENT
Start: 2021-08-12 | End: 2021-08-13

## 2021-08-12 RX ORDER — SODIUM CHLORIDE 9 MG/ML
1000 INJECTION INTRAMUSCULAR; INTRAVENOUS; SUBCUTANEOUS ONCE
Refills: 0 | Status: COMPLETED | OUTPATIENT
Start: 2021-08-12 | End: 2021-08-12

## 2021-08-12 RX ORDER — LEVOTHYROXINE SODIUM 125 MCG
137 TABLET ORAL DAILY
Refills: 0 | Status: DISCONTINUED | OUTPATIENT
Start: 2021-08-12 | End: 2021-08-13

## 2021-08-12 RX ADMIN — Medication 500 MILLIGRAM(S): at 14:31

## 2021-08-12 RX ADMIN — PANTOPRAZOLE SODIUM 40 MILLIGRAM(S): 20 TABLET, DELAYED RELEASE ORAL at 14:32

## 2021-08-12 RX ADMIN — Medication 650 MILLIGRAM(S): at 12:30

## 2021-08-12 RX ADMIN — SERTRALINE 50 MILLIGRAM(S): 25 TABLET, FILM COATED ORAL at 12:30

## 2021-08-12 RX ADMIN — Medication 650 MILLIGRAM(S): at 18:55

## 2021-08-12 RX ADMIN — SODIUM CHLORIDE 1000 MILLILITER(S): 9 INJECTION INTRAMUSCULAR; INTRAVENOUS; SUBCUTANEOUS at 08:03

## 2021-08-12 RX ADMIN — Medication 500 MILLIGRAM(S): at 22:35

## 2021-08-12 RX ADMIN — IMMUNE GLOBULIN (HUMAN) 162.5 GRAM(S): 10 INJECTION INTRAVENOUS; SUBCUTANEOUS at 12:59

## 2021-08-12 RX ADMIN — Medication 500 MILLIGRAM(S): at 18:54

## 2021-08-12 RX ADMIN — Medication 137 MICROGRAM(S): at 14:32

## 2021-08-12 RX ADMIN — Medication 40 MILLIGRAM(S): at 12:30

## 2021-08-12 RX ADMIN — Medication 25 MILLIGRAM(S): at 12:30

## 2021-08-12 NOTE — H&P ADULT - ASSESSMENT
62 y/o woman with a PMHx of ITP (diagnosed June 2021), hypothyroid, anxiety/depression, arthritis, with recent [facility] admission 7/29/21 - 8/1/21 for thrombocytopenia 2/2 ITP requiring IVIG and recommended to continue rituximab infusions outpatient followed by Dr. Mann, with her last rituximab infusion 8/11, sent to ED by outpatient heme/onc provider for platelets of 3k. Admitted to [facility] for thrombocytopenia 2/2 persistent refractory ITP 60 y/o woman with a PMHx of ITP (diagnosed June 2021), hypothyroid, anxiety/depression, arthritis, with recent [facility] admission 7/29/21 - 8/1/21 for thrombocytopenia 2/2 ITP requiring IVIG and recommended to continue rituximab infusions outpatient followed by Dr. Mann, with her last rituximab infusion 8/11, sent to ED by outpatient heme/onc provider for platelets of 5k. Admitted to [facility] for thrombocytopenia 2/2 persistent refractory ITP

## 2021-08-12 NOTE — ED ADULT NURSE NOTE - ED CARDIAC RATE
Return for Tb reading on Sunday 9a-3:30p (closed 1-2)    MANTOUX TUBERCULIN (a.k.a. TB) SKIN TEST      What is Tuberculosis/TB?  Tuberculosis/TB is an infectious disease, which is spread through the air by tiny germs when people cough, sneeze, speak or sing. TB germs are very small and can remain in the air for a long period of time. TB germs most oft  en settle in your lungs, but may also settle in other organs of your body. TB germs can be present in your body without making you ill. This is called TB INFECTION. TB infection cannot be spread to other people. When your  body’s defenses become weak and can no longer control the TB germs they multiply, this is called TB DISEASE. It can take month(s) to year(s) for TB infection to become TB disease. The TB SKIN TEST can show if a person has  been “infected” by TB germs.    How is the Mantoux Tuberculin/TB skin test given?  A very small amount of a product called Purified Protein Derivative (PPD) is injected just under the top layer of the skin on the forearm. Persons who have been infected by the TB germs usually react to the test by developing swelling at the injection site. The skin test results must be read 48 to 72 hours after given. Failure to return within this time frame means the test will need to be repeated.    Side effects…  Side effects from a skin test are rare and may include itching and discomfort at the injection site and very rarely the possibility of a blister, ulceration or necrosis (dead tissue) at the site if the injection.   bradycardic

## 2021-08-12 NOTE — ED PROVIDER NOTE - PHYSICAL EXAMINATION
VITAL SIGNS: I have reviewed nursing notes and confirm.  CONSTITUTIONAL: Well-developed; well-nourished; in no acute distress.   SKIN:  Bruising noted on torso and b/l extremities. warm and dry, no acute rash.   HEAD:  normocephalic, atraumatic.  EYES: PERRL, EOM intact; conjunctiva and sclera clear.  ENT: No nasal discharge; airway clear.   NECK: Supple; non tender.  CARD: Regular rate and rhythm.   RESP:  Clear to auscultation b/l, no wheezes, rales or rhonchi.  ABD: Normal bowel sounds; soft; non-distended; non-tender; no guarding/ rebound.  EXT: Normal ROM. No clubbing, cyanosis or edema. 2+ pulses to b/l ue/le.  NEURO: Alert, oriented, grossly unremarkable  PSYCH: Cooperative, mood and affect appropriate. VITAL SIGNS: I have reviewed nursing notes and confirm.  CONSTITUTIONAL: Well-developed; well-nourished; in no acute distress.   SKIN:  Bruising noted on torso and b/l extremities. warm and dry, no acute rash.   HEAD:  normocephalic, atraumatic.  EYES:  conjunctiva and sclera clear.  ENT: No nasal discharge; airway clear.   NECK: Supple; non tender.  CARD: Regular rate and rhythm.   RESP:  Clear to auscultation b/l, no wheezes, rales or rhonchi.  ABD:  soft; non-distended; non-tender; no guarding/ rebound.  EXT: Normal ROM.    NEURO: Alert, oriented, grossly unremarkable  PSYCH: Cooperative, mood and affect appropriate.

## 2021-08-12 NOTE — H&P ADULT - HISTORY OF PRESENT ILLNESS
60 y/o woman with a PMHx of ITP (diagnosed June 2021), hypothyroid, anxiety/depression, arthritis, with recent [facility] admission 7/29/21 - 8/1/21 for thrombocytopenia 2/2 ITP requiring IVIG and recommended to continue rituximab infusions outpatient followed by Dr. Mann, with her last rituximab infusion 8/11, sent to ED by outpatient heme/onc provider for platelets of 3k. Pt    ED Vitals: T 99F, /90, HR 59-78, RR 18, SpO2 96% RA  ED Labs: WBC 2.54, Hb 12.2, plt 2k, INR 1.11, sCr 1.16 (baseline 0.82 1 week ago)  ED Imaging: none  ED Management: PO tylenol 650mg x1. PO decadron 40mg x1, Po benadryl 50mg x1, IVIG 1mg/kg x1   62 y/o woman with a PMHx of ITP (diagnosed June 2021), hypothyroid, anxiety/depression, arthritis, with recent [facility] admission 7/29/21 - 8/1/21 for thrombocytopenia 2/2 ITP requiring IVIG and recommended to continue rituximab infusions outpatient followed by Dr. Mann, with her last rituximab infusion 8/11, sent to ED by outpatient heme/onc provider for platelets of 3k.     ED Vitals: T 99F, /90, HR 59-78, RR 18, SpO2 96% RA  ED Labs: WBC 2.54, Hb 12.2, plt 5k, INR 1.01, sCr 0.93, CO2 20  ED Imaging: none  ED Management: 1L NS  Heme/Onc (Dr. Mann) contacted in the ED with plans for IVIG -- he will let the fellow know  Admitted to [facility] for thrombocytopenia 2/2 persistent refractory ITP   60 y/o woman with a PMHx of ITP (diagnosed June 2021), hypothyroid, anxiety/depression, arthritis, H pylori, with 4 recent [facility] admissions since 6/23 for thrombocytopenia 2/2 ITP requiring IVIG infusions and steroids, now receiving rituximab infusions as outpatient (last infusion 8/11) who presents after outpatient labs revealed platelet count of 5k. Patient states that she began developing bruising over her 4 extremities in mid-June, she underwent routine lab eval on 6/23 which revealed severe thrombocytopenia and was admitted that date. She was diagnosed with ITP at that time and underwent several IVIG and platelet transfusions with some response in her platelet count and was discharged several days later. Since that admission she has been admitted 3 other instances for refractory thrombocytopenia with platelet counts between 2-4K, requiring additional admissions and treatments with both IVIG and steroids. She has been being followed by Dr. Moran as an outpatient who has recently started weekly rituximab infusions on 7/20. She underwent her third infusion yesterday, however labs this morning came back and she once again had very low platelet counts. She was told by Dr. Moran to come to the ED for more IVIG and steroid therapy. The patient reports that she is not having any vaginal, GI bleeding, nasal or mucosal bleeding. Denies any family history of bleeding disorders. She states that she can tell when her platelets are dropping as she gets the sensation of lightheadedness and dizzy feeling. She states that her bruising over the past month has been getting better and worse depending on her platelet counts, she still has large scattered ecchymosis and petechiae. They are non tender, however she also has been experiencing some subcutaneous nodules that are painful to palpation, these also are waxing and waning. Additionally, patient was started on triple therapy during last admission for H Pylori and she has two days left to complete the course. She is still having dyspepsia but this is improving. Lastly, she continues to have stiffness and Knee pain from her arthritis. She was being evaluated by Dr. Hernández who did a rheumatologic workup which has been negative. Her home naproxen and colchicine have been held given the fact they could contribute to her thrombocytopenia.     ED Vitals: T 99F, /90, HR 59-78, RR 18, SpO2 96% RA  ED Labs: WBC 2.54, Hb 12.2, plt 5k, INR 1.01, sCr 0.93, CO2 20  ED Imaging: none  ED Management: 1L NS  Heme/Onc (Dr. Mann) contacted in the ED with plans for IVIG -- he will let the fellow know  Admitted to [facility] for thrombocytopenia 2/2 persistent refractory ITP   62 y/o woman with a PMHx of ITP (diagnosed June 2021), hypothyroid, anxiety/depression, arthritis, H pylori, with 4 recent [facility] admissions since 6/23 for thrombocytopenia 2/2 ITP requiring IVIG infusions and steroids, now receiving rituximab infusions as outpatient (last infusion 8/11) who presents after outpatient labs revealed platelet count of 5k. Patient states that she began developing bruising over her 4 extremities in mid-June, she underwent routine lab eval on 6/23 which revealed severe thrombocytopenia and was admitted that date. She was diagnosed with ITP at that time and underwent several IVIG and platelet transfusions with some response in her platelet count and was discharged several days later. Since that admission she has been admitted 3 other instances for refractory thrombocytopenia with platelet counts between 2-4K, requiring additional admissions and treatments with both IVIG and steroids. She has been being followed by Dr. Moran as an outpatient who has recently started weekly rituximab infusions on 7/20. She underwent her third infusion yesterday, however labs this morning came back and she once again had very low platelet counts. She was told by Dr. Moran to come to the ED for more IVIG and steroid therapy.     The patient reports that she is not having any vaginal, GI bleeding, nasal or mucosal bleeding. Denies any family history of bleeding disorders. She states that she can tell when her platelets are dropping as she gets the sensation of lightheadedness and dizzy feeling. She states that her bruising over the past month has been getting better and worse depending on her platelet counts, she still has large scattered ecchymosis and petechiae. They are non tender, however she also has been experiencing some subcutaneous nodules that are painful to palpation, these also are waxing and waning. Additionally, patient was started on triple therapy during last admission for H Pylori and she has two days left to complete the course. She is still having dyspepsia but this is improving. Lastly, she continues to have stiffness and Knee pain from her arthritis. She was being evaluated by Dr. Hernández who did a rheumatologic workup which has been negative. Her home naproxen and colchicine have been held given the fact they could contribute to her thrombocytopenia.     ED Vitals: T 99F, /90, HR 59-78, RR 18, SpO2 96% RA  ED Labs: WBC 2.54, Hb 12.2, plt 5k, INR 1.01, sCr 0.93, CO2 20  ED Imaging: none  ED Management: 1L NS  Heme/Onc (Dr. Mann) contacted in the ED with plans for IVIG -- he will let the fellow know  Admitted to [facility] for thrombocytopenia 2/2 persistent refractory ITP

## 2021-08-12 NOTE — H&P ADULT - ATTENDING COMMENTS
Seen and examined by me this afternoon  Admitted for known refractory ITP, s/p rituxan infusion yesterday and platelet count remain low-at 5K today  Overall without active bleeding but multiple petechia and ecchymoses all over body  Apprec Hem recs, IV Ig and steroids-will add PPI  Will complete course of H.pylori treatment that pt was taking as outpatient (needs 2 more days)  C/w synthroid for hypothyroidism  C/w sertraline for anxiety with depression  BMI of 32.7 --> Obesity  Rest as above

## 2021-08-12 NOTE — H&P ADULT - PROBLEM SELECTOR PLAN 3
Plan:  - C/w synthroid 137 mcg daily Previously on synthroid 137 mcg during weekdays and 125 mcg on weekends- was recently switched to 137 daily by pcp    Plan:  - C/w synthroid 137 mcg daily Previously on synthroid 137 mcg during weekdays and 125 mcg on weekends- was recently switched to 137 daily by pcp. Patient is currently euthyroid on this admission.     Plan:  - C/w synthroid 137 mcg daily

## 2021-08-12 NOTE — H&P ADULT - NSICDXFAMILYHX_GEN_ALL_CORE_FT
FAMILY HISTORY:  Sibling  Still living? Unknown  FHx: hyperthyroidism, Age at diagnosis: Age Unknown

## 2021-08-12 NOTE — H&P ADULT - PROBLEM SELECTOR PLAN 5
Plan:  - No longer on ezetimibe or statin  - Will continue to hold Patient has bad reaction to statin and was being treated with ezetimibe until recently     Plan:  - No longer on ezetimibe or statin  - Will continue to hold

## 2021-08-12 NOTE — H&P ADULT - PROBLEM SELECTOR PLAN 6
Plan:   - Sees Dr. Hernández as outpatient who did rheum workup and was all negative  - Holding colchicine and naproxen given low platelts  - Still in pain so will try tylenol Unlikely autoimmune given negative outpatient rheum workup  Patient is experiencing a lot of pain and stiffness since she is not being treated currently   Plan:   - Sees Dr. Hernández as outpatient who did rheum workup and was all negative  - Holding colchicine and naproxen given low platelts  - Still in pain so will try tylenol Unlikely autoimmune given negative outpatient rheum workup  Patient is experiencing a lot of pain and stiffness since she is not being treated currently     Plan:   - Sees Dr. Hernández as outpatient who did rheum workup and was all negative  - Holding colchicine and naproxen given low platelets  - Still in pain so will try Tylenol

## 2021-08-12 NOTE — H&P ADULT - PROBLEM SELECTOR PLAN 1
Plan:  - IVIG infusion 1g/kg over 24 hrs for 2 days   - Decadron 40 mg daily  - Dr. Moran is aware and following case   - last dose of rituximab was yesterday- weekly infusions P/w platelet count of 5K after labs during her outpatient rituximab infusion yesterday came back, was instructed by Dr. Moran to come in.  Diagnosed initially with ITP on 6/23 after found to low platelets on outpatient labs  Since that admission she has had 3 other admissions for treatment refractory thrombocytopenia- treated multiple times with IVIG and steroids with some platelet response however, non sustained  On 7/20 initiated on rituximab infusions, received third infusion 8/11    Plan:  - IVIG infusion 1g/kg over 24 hrs for 2 days   - Decadron 40 mg daily  - Dr. Moran is aware and following case   - last dose of rituximab was yesterday- weekly infusions\  - Discussed alternative treatments given that this continues to be refractory such as splenectomy or TPO P/w platelet count of 5K after labs during her outpatient rituximab infusion yesterday came back, was instructed by Dr. Moran to come in.  Diagnosed initially with ITP on 6/23 after found to low platelets on outpatient labs  Since that admission she has had 3 other admissions for treatment refractory thrombocytopenia- treated multiple times with IVIG and steroids with some platelet response however, non sustained  On 7/20 initiated on rituximab infusions, received third infusion 8/11    Plan:  - IVIG infusion 1g/kg over 24 hrs for 2 days   - Decadron 40 mg daily  - Protonix for GI ppx   - Dr. Moran is aware and following case   - last dose of rituximab was yesterday- weekly infusions  - Discussed alternative treatments given that this continues to be refractory such as splenectomy or TPO

## 2021-08-12 NOTE — ED PROVIDER NOTE - PROGRESS NOTE DETAILS
Contacted Dr. Byrnes who states pt has refractory ITP. IVIG infusions which she received in the past improve the condition. Pt needs to be admitted. Contacted Dr. Byrnes who states pt has refractory ITP and IVIG infusions which she has received in the past improves her condition. Pt needs to be admitted for IVIG.

## 2021-08-12 NOTE — H&P ADULT - NSICDXPASTMEDICALHX_GEN_ALL_CORE_FT
PAST MEDICAL HISTORY:  Arthritis     History of ITP     HLD (hyperlipidemia)     Hypothyroidism       Anxiety     Depression

## 2021-08-12 NOTE — ED ADULT NURSE NOTE - HISTORY OF COVID-19 VACCINATION
"Patient states intermittent "shooting" pain right buttock radiating down leg to ankle x 2 days. States she "always" has back pain, states pain every minute. No known injury. Pain worse last night.   "
Yes

## 2021-08-12 NOTE — ED PROVIDER NOTE - NSICDXPASTSURGICALHX_GEN_ALL_CORE_FT
PAST SURGICAL HISTORY:  No significant past surgical history      PAST SURGICAL HISTORY:  No significant past surgical history

## 2021-08-12 NOTE — H&P ADULT - NSHPPHYSICALEXAM_GEN_ALL_CORE
------------------INCOMPLETE-----------------------  VITAL SIGNS:  T(C): 36.8 (08-12-21 @ 10:57), Max: 37.2 (08-12-21 @ 06:34)  T(F): 98.3 (08-12-21 @ 10:57), Max: 99 (08-12-21 @ 06:34)  HR: 81 (08-12-21 @ 10:57) (59 - 89)  BP: 146/98 (08-12-21 @ 10:57) (143/90 - 151/93)  RR: 18 (08-12-21 @ 10:57) (17 - 18)  SpO2: 97% (08-12-21 @ 10:57) (96% - 97%)    PHYSICAL EXAM:    Constitutional: Resting comfortably in bed; NAD  HEENT: NC/AT, PERRL, EOMI, anicteric sclera, neck supple  Respiratory: CTA B/L; no W/R/R, no retractions  Cardiac: +S1/S2; RRR; no M/R/G appreciated  Gastrointestinal: abdomen soft, NT/ND, +BS, no rebound tenderness or guarding  Extremities: legs WWP, no peripheral edema  Vascular: Pulses are intact throughout   Dermatologic: skin warm, dry and intact; scattered bruises and petechiae throughout all 4 extremities- some are fading and others appear new, nontender. Patient has subcutaneous nodules that are hard and tender over the pretibial areas and right medial epicondyle.   Neurologic: AAOx3; CN grossly intact; moves all 4 extremities; no focal deficits  Psychiatric: Patient with somewhat odd affect, rapid speech, heightened mood, rapidly jumping from topic to topic

## 2021-08-12 NOTE — ED ADULT NURSE NOTE - OBJECTIVE STATEMENT
Pt presented to the ED with complaints of abnormal lab result. Pt has a history of ITP, as per pt her last platelet count yesterday was 7000. Pt states that she has been developing bruises to her bilateral lower extremity. Pt denies chest pain, palpitations, lightheadedness, dizziness, fever, nausea, or vomiting.

## 2021-08-12 NOTE — H&P ADULT - NSHPLABSRESULTS_GEN_ALL_CORE
LABS:                    12.4   2.54  )-----------( 5        ( 12 Aug 2021 07:13 )             36.9     08-12    135  |  106  |  16  ----------------------------<  126<H>  4.4   |  20<L>  |  0.93    Ca    9.1      12 Aug 2021 07:13    TPro  7.9  /  Alb  3.4  /  TBili  0.4  /  DBili  x   /  AST  53<H>  /  ALT  44  /  AlkPhos  76  08-12    PT/INR - ( 12 Aug 2021 07:13 )   PT: 12.1 sec;   INR: 1.01       PTT - ( 12 Aug 2021 07:13 )  PTT:24.7 sec

## 2021-08-12 NOTE — ED PROVIDER NOTE - NSICDXPASTMEDICALHX_GEN_ALL_CORE_FT
PAST MEDICAL HISTORY:  Arthritis     History of ITP     HLD (hyperlipidemia)     Hypothyroidism      PAST MEDICAL HISTORY:  Arthritis     History of ITP     HLD (hyperlipidemia)     Hypothyroidism       Anxiety     Depression

## 2021-08-12 NOTE — H&P ADULT - PROBLEM SELECTOR PLAN 2
Plan:  - Will continue course of triple therapy today and tomorrow Patient was experiencing dyspepsia during last hospitalizations   Diagnosed with H pylori on 7/29 and started on 2 weeks of triple therapy  Plan:  - Pantoprazole 40 mg BID, Metronidazole 50 mg TID, Clarithromycin 500 mg BID   - Will continue course of triple therapy today and tomorrow Patient was experiencing dyspepsia during last hospitalizations   Diagnosed with H pylori on 7/29 and started on 2 weeks of triple therapy  Plan:  - Pantoprazole 40 mg BID, Metronidazole 50 mg TID, Clarithromycin 500 mg BID   - Will continue course of triple therapy today and tomorrow  - Will c/w PPI as long as she is on steroids

## 2021-08-12 NOTE — ED PROVIDER NOTE - OBJECTIVE STATEMENT
62 y/o F with PMHx of ITP diagnosed in June 2021, hypothyroidism, depression and anxiety presents today with low platelet count. Pt states she is followed by Dr. Byrnes and received a Rituxan infusion to treat ITP yesterday. When she went for the infusion, blood work was done with noted low platelet count of 7, and the pt was told to come to the ED. However, she states she didn't want to come yeseterday and wanted to see if her medications would help, deciding to come today instead. Notes feeling mildly fatigued for five days, but no other symptoms. Denies chest pain, SOB, HA, vomiting, nausea and abdominal pain. Notes bruises over body for past few months since ITP diagnosis. Denies hematemesis, emesis, bloody or black stools, injuries or falls. Pt is fully vaccinated against COVID-19 as of April 2021. 60 y/o F with PMHx of ITP diagnosed in June 2021, hypothyroidism, depression and anxiety presents today with low platelet count. Pt states she is followed by Dr. Byrnes and received a Rituxan infusion to treat ITP yesterday. When she went for the infusion, blood work was done with noted low platelet count of 7, and the pt was told to come to the ED. However, she states she didn't want to come yesterday and wanted to see if her infusion would help and deciding to come today instead. Notes feeling mildly fatigued for five days, but no other symptoms. Denies chest pain, SOB, HA, vomiting, nausea and abdominal pain. Notes bruises over body for past few months since ITP diagnosis. Denies hematemesis, emesis, bloody or black stools, recent injuries or falls. Pt is fully vaccinated against COVID-19 as of April 2021.

## 2021-08-12 NOTE — ED PROVIDER NOTE - CLINICAL SUMMARY MEDICAL DECISION MAKING FREE TEXT BOX
60 y/o F with PMHx of ITP diagnosed in June 2021, hypothyroidism, depression and anxiety presents today with low platelet count. Labs sent. pt afebrile, mildly hypotensive in ED.   ED Course: WBC 2.54 likely secondary to Rituxan. Platelets noted to be 5, rest of blood work wnl. Pt admitted for IVIG infusion. 60 y/o F with PMHx of ITP diagnosed in June 2021, hypothyroidism, depression and anxiety presents today with low platelet count. Labs sent. pt afebrile, mildly hypertensive in ED. Labs noted and WBC 2.54 likely secondary to Rituxan infusion. Platelets noted to be 5, rest of blood work wnl. Case discussed with Dr. Johnson. Pt admitted for IVIG infusion. Stable in ED.

## 2021-08-12 NOTE — H&P ADULT - PROBLEM SELECTOR PLAN 7
F: None   E: Replete as necessary K>4 Mg>2  N: regular  DVT Prophylaxis: None   GI prophylaxis: None   CODE STATUS: FULL

## 2021-08-13 ENCOUNTER — TRANSCRIPTION ENCOUNTER (OUTPATIENT)
Age: 61
End: 2021-08-13

## 2021-08-13 VITALS
TEMPERATURE: 98 F | SYSTOLIC BLOOD PRESSURE: 121 MMHG | RESPIRATION RATE: 18 BRPM | OXYGEN SATURATION: 97 % | HEART RATE: 81 BPM | DIASTOLIC BLOOD PRESSURE: 81 MMHG

## 2021-08-13 LAB
ALBUMIN SERPL ELPH-MCNC: 3.3 G/DL — SIGNIFICANT CHANGE UP (ref 3.3–5)
ALP SERPL-CCNC: 59 U/L — SIGNIFICANT CHANGE UP (ref 40–120)
ALT FLD-CCNC: 34 U/L — SIGNIFICANT CHANGE UP (ref 10–45)
ANION GAP SERPL CALC-SCNC: 9 MMOL/L — SIGNIFICANT CHANGE UP (ref 5–17)
AST SERPL-CCNC: 36 U/L — SIGNIFICANT CHANGE UP (ref 10–40)
BILIRUB SERPL-MCNC: 0.4 MG/DL — SIGNIFICANT CHANGE UP (ref 0.2–1.2)
BUN SERPL-MCNC: 15 MG/DL — SIGNIFICANT CHANGE UP (ref 7–23)
CALCIUM SERPL-MCNC: 8.9 MG/DL — SIGNIFICANT CHANGE UP (ref 8.4–10.5)
CHLORIDE SERPL-SCNC: 107 MMOL/L — SIGNIFICANT CHANGE UP (ref 96–108)
CO2 SERPL-SCNC: 21 MMOL/L — LOW (ref 22–31)
COVID-19 SPIKE DOMAIN AB INTERP: POSITIVE
COVID-19 SPIKE DOMAIN ANTIBODY RESULT: >250 U/ML — HIGH
CREAT SERPL-MCNC: 0.81 MG/DL — SIGNIFICANT CHANGE UP (ref 0.5–1.3)
GLUCOSE BLDC GLUCOMTR-MCNC: 127 MG/DL — HIGH (ref 70–99)
GLUCOSE BLDC GLUCOMTR-MCNC: 161 MG/DL — HIGH (ref 70–99)
GLUCOSE SERPL-MCNC: 130 MG/DL — HIGH (ref 70–99)
HCT VFR BLD CALC: 37.4 % — SIGNIFICANT CHANGE UP (ref 34.5–45)
HGB BLD-MCNC: 12.1 G/DL — SIGNIFICANT CHANGE UP (ref 11.5–15.5)
MCHC RBC-ENTMCNC: 30.6 PG — SIGNIFICANT CHANGE UP (ref 27–34)
MCHC RBC-ENTMCNC: 32.4 GM/DL — SIGNIFICANT CHANGE UP (ref 32–36)
MCV RBC AUTO: 94.7 FL — SIGNIFICANT CHANGE UP (ref 80–100)
NRBC # BLD: 0 /100 WBCS — SIGNIFICANT CHANGE UP (ref 0–0)
PLATELET # BLD AUTO: 30 K/UL — LOW (ref 150–400)
POTASSIUM SERPL-MCNC: 4 MMOL/L — SIGNIFICANT CHANGE UP (ref 3.5–5.3)
POTASSIUM SERPL-SCNC: 4 MMOL/L — SIGNIFICANT CHANGE UP (ref 3.5–5.3)
PROT SERPL-MCNC: 8.9 G/DL — HIGH (ref 6–8.3)
RBC # BLD: 3.95 M/UL — SIGNIFICANT CHANGE UP (ref 3.8–5.2)
RBC # FLD: 12.9 % — SIGNIFICANT CHANGE UP (ref 10.3–14.5)
SARS-COV-2 IGG+IGM SERPL QL IA: >250 U/ML — HIGH
SARS-COV-2 IGG+IGM SERPL QL IA: POSITIVE
SODIUM SERPL-SCNC: 137 MMOL/L — SIGNIFICANT CHANGE UP (ref 135–145)
WBC # BLD: 5.56 K/UL — SIGNIFICANT CHANGE UP (ref 3.8–10.5)
WBC # FLD AUTO: 5.56 K/UL — SIGNIFICANT CHANGE UP (ref 3.8–10.5)

## 2021-08-13 PROCEDURE — 86769 SARS-COV-2 COVID-19 ANTIBODY: CPT

## 2021-08-13 PROCEDURE — 84443 ASSAY THYROID STIM HORMONE: CPT

## 2021-08-13 PROCEDURE — 86901 BLOOD TYPING SEROLOGIC RH(D): CPT

## 2021-08-13 PROCEDURE — 86900 BLOOD TYPING SEROLOGIC ABO: CPT

## 2021-08-13 PROCEDURE — 85610 PROTHROMBIN TIME: CPT

## 2021-08-13 PROCEDURE — 87635 SARS-COV-2 COVID-19 AMP PRB: CPT

## 2021-08-13 PROCEDURE — 99285 EMERGENCY DEPT VISIT HI MDM: CPT | Mod: 25

## 2021-08-13 PROCEDURE — 85025 COMPLETE CBC W/AUTO DIFF WBC: CPT

## 2021-08-13 PROCEDURE — 93005 ELECTROCARDIOGRAM TRACING: CPT

## 2021-08-13 PROCEDURE — 80053 COMPREHEN METABOLIC PANEL: CPT

## 2021-08-13 PROCEDURE — 84439 ASSAY OF FREE THYROXINE: CPT

## 2021-08-13 PROCEDURE — 85730 THROMBOPLASTIN TIME PARTIAL: CPT

## 2021-08-13 PROCEDURE — 85027 COMPLETE CBC AUTOMATED: CPT

## 2021-08-13 PROCEDURE — 86850 RBC ANTIBODY SCREEN: CPT

## 2021-08-13 PROCEDURE — 99239 HOSP IP/OBS DSCHRG MGMT >30: CPT | Mod: GC

## 2021-08-13 PROCEDURE — 82962 GLUCOSE BLOOD TEST: CPT

## 2021-08-13 PROCEDURE — 36415 COLL VENOUS BLD VENIPUNCTURE: CPT

## 2021-08-13 PROCEDURE — 71045 X-RAY EXAM CHEST 1 VIEW: CPT

## 2021-08-13 PROCEDURE — 85007 BL SMEAR W/DIFF WBC COUNT: CPT

## 2021-08-13 RX ORDER — DEXAMETHASONE 0.5 MG/5ML
10 ELIXIR ORAL
Qty: 70 | Refills: 0
Start: 2021-08-13 | End: 2021-08-19

## 2021-08-13 RX ORDER — DEXAMETHASONE 0.5 MG/5ML
2 ELIXIR ORAL
Qty: 20 | Refills: 0
Start: 2021-08-13 | End: 2021-08-22

## 2021-08-13 RX ORDER — PANTOPRAZOLE SODIUM 20 MG/1
1 TABLET, DELAYED RELEASE ORAL
Qty: 30 | Refills: 0
Start: 2021-08-13 | End: 2021-09-11

## 2021-08-13 RX ORDER — SERTRALINE 25 MG/1
1 TABLET, FILM COATED ORAL
Qty: 0 | Refills: 0 | DISCHARGE
Start: 2021-08-13

## 2021-08-13 RX ORDER — SERTRALINE 25 MG/1
1 TABLET, FILM COATED ORAL
Qty: 0 | Refills: 0 | DISCHARGE

## 2021-08-13 RX ADMIN — SERTRALINE 50 MILLIGRAM(S): 25 TABLET, FILM COATED ORAL at 11:26

## 2021-08-13 RX ADMIN — Medication 137 MICROGRAM(S): at 06:45

## 2021-08-13 RX ADMIN — Medication 2: at 12:29

## 2021-08-13 RX ADMIN — Medication 500 MILLIGRAM(S): at 11:26

## 2021-08-13 RX ADMIN — Medication 40 MILLIGRAM(S): at 11:27

## 2021-08-13 RX ADMIN — Medication 650 MILLIGRAM(S): at 11:30

## 2021-08-13 RX ADMIN — IMMUNE GLOBULIN (HUMAN) 162.5 GRAM(S): 10 INJECTION INTRAVENOUS; SUBCUTANEOUS at 12:04

## 2021-08-13 RX ADMIN — Medication 500 MILLIGRAM(S): at 06:47

## 2021-08-13 RX ADMIN — PANTOPRAZOLE SODIUM 40 MILLIGRAM(S): 20 TABLET, DELAYED RELEASE ORAL at 03:45

## 2021-08-13 RX ADMIN — Medication 650 MILLIGRAM(S): at 11:40

## 2021-08-13 RX ADMIN — Medication 25 MILLIGRAM(S): at 11:30

## 2021-08-13 NOTE — CONSULT NOTE ADULT - SUBJECTIVE AND OBJECTIVE BOX
SUBJECTIVE: No acute overnight events    HISTORY OF PRESENTING ILLNESS:   61F PMH ITP (Dx in June 2021 rx'ed w/ Steroids/IVIG), Hypothyroidism, HLD, Depression, arthritis, and macular degeneration presents from outpt Hematologist office w/ Plt count drop from 15k on 7/4 to 1k on 7/5. Of note pt was seen at [facility] ED on 7/4 after was found to have Plt<15k on outpt labs was dc'ed home as per recommendation of hematologist,  on Decadron x 3 days and outpt Hem f/up. Pt notes multiple large  bruising to b/l UE but has not increased over the past few days. Pt also suffers from long standing fatigue and diffuse joint stiffness, worse in back, knees and shoulder, that occurs in the morning and eventually improves after a couple of hours. On ROS denies fever, night sweats, gum bleeding, rash, joint swelling, chest pain, palpitation, SOB, abdominal pain, any recent illness, travel hx  and dysuria. Pt seen by H/O in the ED recommended IVIG and Steroids. Admitted to medicine for management of ITP.   Pt was admitted at [facility] in June and had extensive autoimmune/infx/Hem w/up that was grossly unremarkable and was instructed to f/u w/ Rheum and Hem as outpt. F/up w/ Rheum at [facility]    Home Medications: Naproxen 500 mg, Ezetimibe 10 mg, Olopatadine 0.2% eye drop, Sertraline 50 mg, Synthroid 137 mcg M-F, Synthroid 125 mcg S/S.     Mammogram in 2021 normal. Pap smear 2010, normal. Colonoscopy in 2015 or 2016 was normal.     PAST MEDICAL & SURGICAL HISTORY:  Hypothyroidism  HLD (hyperlipidemia)  History of ITP  Arthritis  No significant past surgical history    SOCIAL HISTORY:  Works at a law firm in administration  Does not smoke. Denies drug use. Takes Magnesium, Zinc, Calcium/Vitamin D3, Biotin and Lyrine   Mother: Had brain aneurysm in 1996  Father: Had small bladder tumor that was removed   Brother: Grave's ophthalmopathy   Denies family history of bleeding or clotting disorders.     ALLERGIES:  azithromycin (Rash)  penicillin (Rash)  vancomycin (Rash)    MEDICATIONS:  STANDING MEDICATIONS  acetaminophen   Tablet .. 650 milliGRAM(s) Oral once  dexAMETHasone  IVPB 40 milliGRAM(s) IV Intermittent every 24 hours  dextrose 40% Gel 15 Gram(s) Oral once  dextrose 5%. 1000 milliLiter(s) IV Continuous <Continuous>  dextrose 5%. 1000 milliLiter(s) IV Continuous <Continuous>  dextrose 50% Injectable 25 Gram(s) IV Push once  dextrose 50% Injectable 12.5 Gram(s) IV Push once  dextrose 50% Injectable 25 Gram(s) IV Push once  diphenhydrAMINE 50 milliGRAM(s) Oral once  glucagon  Injectable 1 milliGRAM(s) IntraMuscular once  immune   globulin 10% (GAMMAGARD) IVPB 65 Gram(s) IV Intermittent once  insulin lispro (ADMELOG) corrective regimen sliding scale   SubCutaneous three times a day before meals  insulin lispro (ADMELOG) corrective regimen sliding scale   SubCutaneous at bedtime  levothyroxine 125 MICROGram(s) Oral <User Schedule>  levothyroxine 137 MICROGram(s) Oral <User Schedule>  pantoprazole    Tablet 40 milliGRAM(s) Oral before breakfast  sertraline 50 milliGRAM(s) Oral daily    PRN MEDICATIONS    VITALS:   T(F): 97.8  HR: 76  BP: 138/86  RR: 18  SpO2: 96%    LABS:  Reviewed    RADIOLOGY:  None     PHYSICAL EXAM:  GEN: No acute distress  HEENT: NCAT  LUNGS: Clear to auscultation bilaterally   HEART: S1/S2 present. RRR.   ABD: Soft, non-tender, non-distended. Bowel sounds present. Bruising on torso/abdomen/arms/legs. One small posterior palatal petechiae. No hepatosplenomegaly.   EXT: No pitting edema  NEURO: AAOX3

## 2021-08-13 NOTE — PROGRESS NOTE ADULT - PROBLEM SELECTOR PLAN 1
Presented due to outpatient labs of 7k platelets. Received 1 ivig infusion yesterday, second today   Plan:  - Decadron 40 mg daily  - Protonix for GI ppx   - f/u Dr. Moran 8/18

## 2021-08-13 NOTE — DISCHARGE NOTE PROVIDER - PROVIDER TOKENS
PROVIDER:[TOKEN:[[id number]],SCHEDULEDAPPT:[08/18/2021],SCHEDULEDAPPTTIME:[12:00 PM],ESTABLISHEDPATIENT:[T]]

## 2021-08-13 NOTE — PROGRESS NOTE ADULT - PROBLEM SELECTOR PLAN 5
Patient has bad reaction to statin and was being treated with ezetimibe until recently     Plan:  - No longer on ezetimibe or statin  - Will continue to hold

## 2021-08-13 NOTE — DISCHARGE NOTE PROVIDER - NSDCCPCAREPLAN_GEN_ALL_CORE_FT
PRINCIPAL DISCHARGE DIAGNOSIS  Diagnosis: Idiopathic thrombocytopenic purpura (ITP)  Assessment and Plan of Treatment: Your healthcare provider has diagnosed you with immune thrombocytopenic purpura (ITP). ITP is also called idiopathic thrombocytopenic purpura. ITP is a bleeding disorder that causes your immune system to destroy your platelets. Platelets are cells that help stop bleeding. If your body doesn't have enough platelets, your risk for bleeding goes up.  -continue your follow up with Dr. Fulton on 8/18      SECONDARY DISCHARGE DIAGNOSES  Diagnosis: Gastritis, Helicobacter pylori  Assessment and Plan of Treatment: Ulcers are sores in the lining of your digestive tract. They were once thought to be caused by too much spicy food, stress, or an anxious personality. It's now known that most ulcers are likely due to infection with bacteria known as Helicobacter pylori (H. pylori). They could also be from using anti-inflammatory medicines such as aspirin and NSAIDs. These include ibuprofen and naproxen. You were diagnosed with H pylori and completed treatment for it.  Plan  -monitor your gastritis symptoms, continue with pantoprazole

## 2021-08-13 NOTE — PROGRESS NOTE ADULT - PROBLEM SELECTOR PLAN 6
Unlikely autoimmune given negative outpatient rheum workup  Patient is experiencing a lot of pain and stiffness since she is not being treated currently     Plan:   -tyelnol prn, hold colchicine and nsaids in setting of thrombocytopenia

## 2021-08-13 NOTE — CONSULT NOTE ADULT - ASSESSMENT
62 yo F, [place] R[place], with PMHx of hypothyroidism, hyperlipidemia, anxiety/depression, macular degeneration, bilateral knee osteoarthritis, was sent to ED for low platelets. Patient has had bruising on her arms/legs/back/torso. Hematology consulted for thrombocytopenia.    ITP- S/p IVIG x2. On steroids as well.  Plt count up to 30 today.  To stay on steroids until follows up in office.  Scheduled for final dose of Rituxan on 8/18/21.  Ok to discharge.  Thank you.    Mally Mercado MD for Dr. Fulton  [phone]

## 2021-08-13 NOTE — PROGRESS NOTE ADULT - PROBLEM SELECTOR PLAN 3
Previously on synthroid 137 mcg during weekdays and 125 mcg on weekends- was recently switched to 137 daily by pcp. Patient is currently euthyroid on this admission.     Plan:  - C/w synthroid 137 mcg daily

## 2021-08-13 NOTE — PROGRESS NOTE ADULT - PROBLEM SELECTOR PLAN 2
Diagnosed with H pylori on 7/29 and started on 2 weeks of triple therapy. Completed treatment today   Plan:  - Will c/w PPI as long as she is on steroids

## 2021-08-13 NOTE — PROGRESS NOTE ADULT - ASSESSMENT
60 y/o woman with a PMHx of ITP (diagnosed June 2021), hypothyroid, anxiety/depression, arthritis, with recent [facility] admission 7/29/21 - 8/1/21 for thrombocytopenia 2/2 ITP requiring IVIG and recommended to continue rituximab infusions outpatient followed by Dr. Mann, with her last rituximab infusion 8/11, sent to ED by outpatient heme/onc provider for platelets of 5k. Admitted to [email or web address] for thrombocytopenia 2/2 persistent refractory ITP

## 2021-08-13 NOTE — DISCHARGE NOTE PROVIDER - HOSPITAL COURSE
#Discharge: do not delete    Patient is 60 yo F with past medical history of ITP, HLD, hypothyroidism, depression, arthritis who was found to have platelets of 7k on outpatient labs sent in by her doctor hematologist. Patient says she was tired but had no other symptoms. Patient got 2 treatments of IVIG while in the hospital, platelets went up to 30k after first treatment. Patient also completed triple therapy for h pylori.    Problem List/Main Diagnoses (system-based):   #Thrombocytopenia  2/2 ITP diagnosis in June 2021, patient on IVIG in hospital 2x, and decadron. Patient had fatigue and petechiae no other signs of bleeding.   -f/u Dr. Fulton  #Hpylori   Patient recently diagnosed wit H pylori, was started on triple therapy. Completed it 8/13.   #Hypothyroidism  -c/w levothyroxine  #Depression  -c/w sertaline  Inpatient treatment course: see above  New medications: none  Labs to be followed outpatient: none  Exam to be followed outpatient: none   #Discharge: do not delete    Patient is 60 yo F with past medical history of ITP, HLD, hypothyroidism, depression, arthritis who was found to have platelets of 7k on outpatient labs sent in by her doctor hematologist. Patient says she was tired but had no other symptoms. Patient got 2 treatments of IVIG while in the hospital, platelets went up to 30k after first treatment. Patient also completed triple therapy for h pylori.    Problem List/Main Diagnoses (system-based):   #Thrombocytopenia   2/2 ITP diagnosis in June 2021, Patient had fatigue and petechiae no other signs of bleeding.   patient on IVIG in hospital 2x, and decadron. Appropriate response to transfusion - platelets increased to 30K.   Will get final dose of rituxan as outpatient when she sees Dr. Fulton n Aug 18th. Plan is to start romiplostim and eltrombopag after completing rituxan.   -f/u Dr. Fulton     #Hpylori   Patient recently diagnosed wit H pylori, was started on triple therapy. Completed it 8/13.   #Hypothyroidism  -c/w levothyroxine  #Moderate Depression  -c/w sertaline    Inpatient treatment course: see above  New medications: none  Labs to be followed outpatient: none  Exam to be followed outpatient: none      ******************************************************  ATTENDING ATTESTATION    I have read and agree with the resident Discharge Note above. Patient seen and discussed with resident team on the day of discharge.     61 year old woman with ITP (hematologist: Dr. Fulton) , diagnosed June 2021, frequent readmissions for ITP since diagnosis (5th admission since June 2021)   Sent in from Hematologist's office for Platelets <10.   Platelets responded appropriately to transfusion. Received IVIG + Steroids.   Will receive final dose of rituxan as outpatient when she sees Dr. Fulton n Aug 18th. Plan is to start romiplostim and eltrombopag after completing rituxan.     Please see progress note from today for physical exam     I was physically present for the evaluation and management services provided. I agree with the included history, physical, and plan which I reviewed and edited where appropriate. I spent > 30 minutes with the patient and the patient's family on direct patient care and discharge planning with more than 50% of the visit spent on counseling and/or coordination of care.

## 2021-08-13 NOTE — DISCHARGE NOTE PROVIDER - NSDCFUSCHEDAPPT_GEN_ALL_CORE_FT
DREW ZAMORA ; 08/30/2021 ; [facility] [address]  DREW ZAMORA ; 09/09/2021 ; \[facility] [place] [facility]\"" Med GenInt [address]

## 2021-08-13 NOTE — PROGRESS NOTE ADULT - SUBJECTIVE AND OBJECTIVE BOX
CC: Patient is a 61y old  Female who presents with a chief complaint of Low platelets (13 Aug 2021 13:17)      INTERVAL EVENTS: GERMANIA    SUBJECTIVE / INTERVAL HPI: Patient seen and examined at bedside. Patient feels good today, showed me new petechiae she noticed on body.     ROS: negative unless otherwise stated above.    VITAL SIGNS:  Vital Signs Last 24 Hrs  T(C): 36.8 (13 Aug 2021 05:12), Max: 36.9 (12 Aug 2021 14:00)  T(F): 98.2 (13 Aug 2021 05:12), Max: 98.5 (12 Aug 2021 14:00)  HR: 86 (13 Aug 2021 05:12) (74 - 86)  BP: 132/76 (13 Aug 2021 05:12) (116/72 - 132/76)  BP(mean): --  RR: 17 (13 Aug 2021 05:12) (17 - 18)  SpO2: 96% (13 Aug 2021 05:12) (96% - 98%)        PHYSICAL EXAM:    General: NAD  HEENT: MMM  Cardiovascular: +S1/S2; RRR  Respiratory: CTA B/L  Gastrointestinal: soft, NT/ND  Extremities: WWP  Skin: Petechiae on all 4 extremities and trunk of various shades  Vascular: 2+ radial, DP/PT pulses B/L  Neurological: AAOx3; no focal deficits    MEDICATIONS:  MEDICATIONS  (STANDING):  clarithromycin 500 milliGRAM(s) Oral every 12 hours  dexAMETHasone     Tablet 40 milliGRAM(s) Oral every 24 hours  dextrose 40% Gel 15 Gram(s) Oral once  dextrose 5%. 1000 milliLiter(s) (50 mL/Hr) IV Continuous <Continuous>  dextrose 5%. 1000 milliLiter(s) (100 mL/Hr) IV Continuous <Continuous>  dextrose 50% Injectable 25 Gram(s) IV Push once  dextrose 50% Injectable 12.5 Gram(s) IV Push once  dextrose 50% Injectable 25 Gram(s) IV Push once  glucagon  Injectable 1 milliGRAM(s) IntraMuscular once  insulin lispro (ADMELOG) corrective regimen sliding scale   SubCutaneous Before meals and at bedtime  levothyroxine 137 MICROGram(s) Oral daily  metroNIDAZOLE    Tablet 500 milliGRAM(s) Oral every 12 hours  pantoprazole    Tablet 40 milliGRAM(s) Oral every 12 hours  sertraline 50 milliGRAM(s) Oral daily    MEDICATIONS  (PRN):      ALLERGIES:  Allergies    azithromycin (Rash)  penicillin (Rash)  vancomycin (Rash)    Intolerances        LABS:                        12.1   5.56  )-----------( 30       ( 13 Aug 2021 07:39 )             37.4     08-13    137  |  107  |  15  ----------------------------<  130<H>  4.0   |  21<L>  |  0.81    Ca    8.9      13 Aug 2021 07:39    TPro  8.9<H>  /  Alb  3.3  /  TBili  0.4  /  DBili  x   /  AST  36  /  ALT  34  /  AlkPhos  59  08-13    PT/INR - ( 12 Aug 2021 07:13 )   PT: 12.1 sec;   INR: 1.01          PTT - ( 12 Aug 2021 07:13 )  PTT:24.7 sec    CAPILLARY BLOOD GLUCOSE      POCT Blood Glucose.: 161 mg/dL (13 Aug 2021 12:07)      RADIOLOGY & ADDITIONAL TESTS: Reviewed.

## 2021-08-13 NOTE — DISCHARGE NOTE NURSING/CASE MANAGEMENT/SOCIAL WORK - PATIENT PORTAL LINK FT
You can access the FollowMyHealth Patient Portal offered by [facility] by registering at the following website: [email or web address]. By joining [facility]’s FollowMyHealth portal, you will also be able to view your health information using other applications (apps) compatible with our system.

## 2021-08-13 NOTE — DISCHARGE NOTE PROVIDER - CARE PROVIDER_API CALL
Jt Fulton)  Hematology; Medical Oncology  [address], [address]  [place] [address]  Phone: [phone]  Fax: [phone]  Established Patient  Scheduled Appointment: 08/18/2021 12:00 PM

## 2021-08-13 NOTE — DISCHARGE NOTE PROVIDER - NSDCMRMEDTOKEN_GEN_ALL_CORE_FT
dexamethasone 20 mg oral tablet: 2 tab(s) orally every 24 hours  levothyroxine 137 mcg (0.137 mg) oral capsule: 1 cap(s) orally once a day -Friday  pantoprazole 40 mg oral delayed release tablet: 1 tab(s) orally once a day  sertraline 50 mg oral tablet: 1 tab(s) orally once a day

## 2021-08-14 DIAGNOSIS — F32.1 MAJOR DEPRESSIVE DISORDER, SINGLE EPISODE, MODERATE: ICD-10-CM

## 2021-08-19 DIAGNOSIS — E66.9 OBESITY, UNSPECIFIED: ICD-10-CM

## 2021-08-19 DIAGNOSIS — M13.80 OTHER SPECIFIED ARTHRITIS, UNSPECIFIED SITE: ICD-10-CM

## 2021-08-19 DIAGNOSIS — D69.3 IMMUNE THROMBOCYTOPENIC PURPURA: ICD-10-CM

## 2021-08-19 DIAGNOSIS — H35.30 UNSPECIFIED MACULAR DEGENERATION: ICD-10-CM

## 2021-08-19 DIAGNOSIS — F32.9 MAJOR DEPRESSIVE DISORDER, SINGLE EPISODE, UNSPECIFIED: ICD-10-CM

## 2021-08-19 DIAGNOSIS — F41.9 ANXIETY DISORDER, UNSPECIFIED: ICD-10-CM

## 2021-08-19 DIAGNOSIS — E03.9 HYPOTHYROIDISM, UNSPECIFIED: ICD-10-CM

## 2021-08-19 DIAGNOSIS — E78.5 HYPERLIPIDEMIA, UNSPECIFIED: ICD-10-CM

## 2021-08-19 DIAGNOSIS — K29.70 GASTRITIS, UNSPECIFIED, WITHOUT BLEEDING: ICD-10-CM

## 2021-08-19 DIAGNOSIS — R63.8 OTHER SYMPTOMS AND SIGNS CONCERNING FOOD AND FLUID INTAKE: ICD-10-CM

## 2021-08-19 DIAGNOSIS — B96.81 HELICOBACTER PYLORI [H. PYLORI] AS THE CAUSE OF DISEASES CLASSIFIED ELSEWHERE: ICD-10-CM

## 2021-08-19 DIAGNOSIS — Z88.0 ALLERGY STATUS TO PENICILLIN: ICD-10-CM

## 2021-08-20 NOTE — ED PROVIDER NOTE - PSYCHIATRIC, MLM
Physical Therapy     Referred by: Skyler Saldivar Jr., MD; Medical Diagnosis (from order):    Diagnosis Information      Diagnosis    724.2 (ICD-9-CM) - M54.5 (ICD-10-CM) - Right low back pain                Daily Treatment Note    Visit:  5     SUBJECTIVE                                                                                                             Patient reports that his back pain is \"calm\" today but still has pain rating of 7/10.  States all positions are painful but he finds sitting the most difficult with increased right low back and leg pain along with bilateral LE numbness from the waist down.  States he did have a L3-L4 injection on Wednesday of this week but has not seen any improvement in his symptoms since.    Functional Change: Unable to work at this time due to pain.  Awaking at night at least 6 times due to pain.    Pain / Symptoms:  Pain/symptom is: constant  Pain rating (out of 10): Current: 7     OBJECTIVE                                                                                                                     Observed Gait:     Patient was able to enter and exit the pool via the stairs with use of the bilateral railing for extra support safely and independently today.          TREATMENT                                                                                                                  Therapeutic Exercise:  Aquatic therapy for core stabilization, stretching and pain relief with decompression of spine using the water's unique properties.  Exercises are as follows:  Ambulation in 3 1/2 feet of water with core setting- 3 1/2 feet of water, 4 pool lengths of 15 feet of each direction of following:  Forwards, backwards, sidestepping, marching    Posterior pelvic tilts x 15  Squats x 20    Hamstring stretch:  Hold 15 sec x 3  Knee to chest:  Hold 15 sec x 3    In 4 1/2 feet of water:  Hip flexion/ extension x 20 each  Hip abduction/ adduction x 20 each  Hip horizontal  abduction/ adduction x 20 each    Deep water:  Biking x 5 min  Knee tucks x 20  Hanging x 5 min    Skilled input: verbal instruction/cues and posture correction  education/instruction on: benefits of aquatic therapy  instruction/cues for: core setting, proper performance of exercises and keeping range of motions comfortable.       ASSESSMENT                                                                                                             Patient tolerated his initial aquatic session relatively well.  He reports decreased numbness in his legs in the 4 1/2 and 5 1/2 feet of water but the numbness returned as soon as he stepped back into the 3 1/2 feet of water.  He had more pain and LE numbness with any lumbar extension or anterior tilt of his pelvis today. He was able to complete all exercises with pain remaining unchanged at 7/10 by the end of his session.   Pain/symptoms after session (out of 10): 7    Patient Education:   Results of above outlined education: Verbalizes understanding and Demonstrates understanding      PLAN                                                                                                                           Suggestions for next session as indicated: Progress per plan of care, Monitor patient's response to today's session and progress as able.  Add underwater treadmill when able.         Therapy procedure time and total treatment time can be found documented on the Time Entry flowsheet   Alert and oriented to person, place, time/situation. normal mood and affect. no apparent risk to self or others.

## 2021-08-25 ENCOUNTER — NON-APPOINTMENT (OUTPATIENT)
Age: 61
End: 2021-08-25

## 2021-08-25 PROBLEM — F41.9 ANXIETY DISORDER, UNSPECIFIED: Chronic | Status: ACTIVE | Noted: 2021-08-12

## 2021-08-25 PROBLEM — F32.9 MAJOR DEPRESSIVE DISORDER, SINGLE EPISODE, UNSPECIFIED: Chronic | Status: ACTIVE | Noted: 2021-08-12

## 2021-08-26 ENCOUNTER — APPOINTMENT (OUTPATIENT)
Dept: INTERNAL MEDICINE | Facility: CLINIC | Age: 61
End: 2021-08-26
Payer: COMMERCIAL

## 2021-08-26 PROCEDURE — 99441: CPT | Mod: GC

## 2021-08-30 ENCOUNTER — APPOINTMENT (OUTPATIENT)
Dept: ENDOCRINOLOGY | Facility: CLINIC | Age: 61
End: 2021-08-30

## 2021-09-09 ENCOUNTER — APPOINTMENT (OUTPATIENT)
Dept: INTERNAL MEDICINE | Facility: CLINIC | Age: 61
End: 2021-09-09
Payer: COMMERCIAL

## 2021-09-09 ENCOUNTER — LABORATORY RESULT (OUTPATIENT)
Age: 61
End: 2021-09-09

## 2021-09-09 VITALS
RESPIRATION RATE: 16 BRPM | DIASTOLIC BLOOD PRESSURE: 95 MMHG | HEART RATE: 80 BPM | SYSTOLIC BLOOD PRESSURE: 124 MMHG | TEMPERATURE: 97.5 F | OXYGEN SATURATION: 98 % | BODY MASS INDEX: 33.47 KG/M2 | WEIGHT: 183 LBS

## 2021-09-09 PROCEDURE — 83013 H PYLORI (C-13) BREATH: CPT

## 2021-09-09 PROCEDURE — 99214 OFFICE O/P EST MOD 30 MIN: CPT | Mod: 25,GC

## 2021-09-09 PROCEDURE — 36415 COLL VENOUS BLD VENIPUNCTURE: CPT

## 2021-09-09 RX ORDER — PANTOPRAZOLE 40 MG/1
40 TABLET, DELAYED RELEASE ORAL TWICE DAILY
Qty: 28 | Refills: 0 | Status: DISCONTINUED | COMMUNITY
Start: 2021-07-27 | End: 2021-09-09

## 2021-09-09 RX ORDER — LEVOTHYROXINE SODIUM 137 UG/1
137 TABLET ORAL
Refills: 0 | Status: DISCONTINUED | COMMUNITY
End: 2021-09-09

## 2021-09-09 RX ORDER — CLARITHROMYCIN 500 MG/1
500 TABLET, FILM COATED ORAL TWICE DAILY
Qty: 28 | Refills: 0 | Status: DISCONTINUED | COMMUNITY
Start: 2021-07-27 | End: 2021-09-09

## 2021-09-09 RX ORDER — METRONIDAZOLE 500 MG/1
500 TABLET ORAL 3 TIMES DAILY
Qty: 42 | Refills: 0 | Status: DISCONTINUED | COMMUNITY
Start: 2021-07-27 | End: 2021-09-09

## 2021-09-09 RX ORDER — LEVOTHYROXINE SODIUM 125 UG/1
125 TABLET ORAL
Refills: 0 | Status: DISCONTINUED | COMMUNITY
End: 2021-09-09

## 2021-09-11 NOTE — HISTORY OF PRESENT ILLNESS
[FreeTextEntry1] : follow up  [de-identified] : 61F pmh ITP, Depression, previous h pylori infection presents for follow up. Pt reports having her Synthroid dose to 137 qd. She also recently saw heme Dr Fulton for her ITP (tested w 4 tx of rituximab) and her plt level was reported to be 125. Her depression is well controlled on sertraline 50 qd and she reported completing her treatment for h pylori. She is scheduled for a pap smear, had a negative cscope in 2014, normal mammogram in 3/2021, and has tested negative for HCV and HIV last year in 2020.

## 2021-09-11 NOTE — END OF VISIT
[] : Resident [FreeTextEntry3] : \par Pt has mild stomach symptoms - h pylori test today for eradication confirmation.   [Time Spent: ___ minutes] : I have spent [unfilled] minutes of time on the encounter.

## 2021-09-11 NOTE — HEALTH RISK ASSESSMENT
[0] : 2) Feeling down, depressed, or hopeless: Not at all (0) [PHQ-2 Negative - No further assessment needed] : PHQ-2 Negative - No further assessment needed [[id number]] : 0

## 2021-09-13 ENCOUNTER — APPOINTMENT (OUTPATIENT)
Dept: INTERNAL MEDICINE | Facility: CLINIC | Age: 61
End: 2021-09-13
Payer: COMMERCIAL

## 2021-09-13 VITALS
SYSTOLIC BLOOD PRESSURE: 119 MMHG | WEIGHT: 183 LBS | TEMPERATURE: 97.8 F | OXYGEN SATURATION: 98 % | DIASTOLIC BLOOD PRESSURE: 87 MMHG | BODY MASS INDEX: 33.47 KG/M2 | RESPIRATION RATE: 16 BRPM | HEART RATE: 81 BPM

## 2021-09-13 DIAGNOSIS — E03.9 HYPOTHYROIDISM, UNSPECIFIED: ICD-10-CM

## 2021-09-13 DIAGNOSIS — A04.8 OTHER SPECIFIED BACTERIAL INTESTINAL INFECTIONS: ICD-10-CM

## 2021-09-13 LAB
TSH SERPL-ACNC: 6.85 UIU/ML
UREA BREATH TEST QL: POSITIVE

## 2021-09-13 PROCEDURE — 99215 OFFICE O/P EST HI 40 MIN: CPT | Mod: GC

## 2021-09-14 PROBLEM — E03.9 ADULT HYPOTHYROIDISM: Status: ACTIVE | Noted: 2021-06-28

## 2021-09-14 PROBLEM — A04.8 H. PYLORI INFECTION: Status: ACTIVE | Noted: 2021-07-26

## 2021-10-21 ENCOUNTER — APPOINTMENT (OUTPATIENT)
Dept: INTERNAL MEDICINE | Facility: CLINIC | Age: 61
End: 2021-10-21
Payer: COMMERCIAL

## 2021-10-21 VITALS
HEART RATE: 78 BPM | OXYGEN SATURATION: 99 % | RESPIRATION RATE: 12 BRPM | SYSTOLIC BLOOD PRESSURE: 124 MMHG | TEMPERATURE: 99.1 F | DIASTOLIC BLOOD PRESSURE: 80 MMHG

## 2021-10-21 DIAGNOSIS — D69.3 IMMUNE THROMBOCYTOPENIC PURPURA: ICD-10-CM

## 2021-10-21 DIAGNOSIS — M25.669 STIFFNESS OF UNSPECIFIED KNEE, NOT ELSEWHERE CLASSIFIED: ICD-10-CM

## 2021-10-21 DIAGNOSIS — R53.83 OTHER FATIGUE: ICD-10-CM

## 2021-10-21 PROCEDURE — 99213 OFFICE O/P EST LOW 20 MIN: CPT | Mod: 25,GC

## 2021-10-21 PROCEDURE — 83013 H PYLORI (C-13) BREATH: CPT | Mod: GC

## 2021-10-21 PROCEDURE — 36415 COLL VENOUS BLD VENIPUNCTURE: CPT | Mod: GC

## 2021-10-21 NOTE — HISTORY OF PRESENT ILLNESS
[FreeTextEntry1] : follow up  [de-identified] : 61F with PMH of ITP, Depression, previous h pylori infection, hypothyroidism, HLD, who presents today for follow up. Patient has been hospitalized 5 times since July 2021 for thrombocytopenia related to ITP. Patient has been following with Dr. Pak. Reports her last platelet count was 260 2 weeks ago. She has a follow up appointment in 2 weeks. She is coming in here today for general stiffness and difficulty sleeping. She also reports insomnia and difficulty falling asleep at night. Patient reports feeling stiff in the morning when she first wakes up and stiff in the evening before she goes to sleep. Patient states that she gets into bed as soon as she gets home and eats dinner in bed sometimes. \par \par Patient has a history of osteoarthritis in her knee with a history of bony spurs and 2 arthroscopies. Also reports pain while walking attributed to bunions on her feet.

## 2021-10-21 NOTE — REVIEW OF SYSTEMS
[Fatigue] : fatigue [Joint Stiffness] : joint stiffness [Muscle Weakness] : muscle weakness [Negative] : Heme/Lymph

## 2021-10-21 NOTE — END OF VISIT
[] : Resident [FreeTextEntry3] :  61 year old F presenting for follow up of fatigue. Diagnosed w/ ITP earlier this year, has been hospitalized at [facility] multiple times since then for severe thrombocytopenia. Since time of diagnosis, has noted lack of motivation to start her day, depressed mood in the mornings, improves significantly once she is out of the house and interacting with people. Goes to bed 830-9pm, wakes around 6am. Gets up multiple times at night to drink water (mouth feels dry) and urinate. No polyuria/polydipsia during the day. Does not snore, no excessive daytime sleepiness. +Chronic knee pain. Notes pain/stiffness in her body, especially in the morning x several months. Improves when she is up and out of the house. Saw rheum and had apparently negative evaluation.  Well appearing talkative female on exam.\par -Fatigue. Likely related to depressed mood given associated amotivation, poor sleep. Appears relatively stable over recent months. Has seen outside rheumatologist with reportedly negative evaluation. Will check labs today including CBC, CMP, TSH given recent adjustment in levothyroxine. Continue to monitor\par -Joint pain/stiffness. Apparently normal evaluation by outside rheumatologist. Referred to PT\par

## 2021-10-21 NOTE — ASSESSMENT
[FreeTextEntry1] : 61F with PMH of ITP, Depression, previous h pylori infection, hypothyroidism, HLD, who presents today for follow up.\par \par #ITP\par Patient has had ~6 hospitalizations for ITP since her diagnosis in June 2021, currently without symptoms. Reports her last platelet count was 260 2 weeks ago\par -continue follow up with Dr. Tucker\par -f/u CBC today\par \par #fatigue\par Patient reports feeling fatigued in the morning when she first wakes up, reporting lack of motivation as a contributing factor. Sleeps adequate amount of hours however reports difficulty initiating sleep and maintaining sleep\par -f/u TSH, B12, CMP\par -counseled on appropriate sleep hygiene\par \par #muscle stiffness\par Patient reports muscle stiffness that is worst in the AM and at the end of the day. Had previously seen a rheumatologist many months ago and no results were found. Has a longstanding history of pain in her knee (has had two arthroscopies). Pain is likely secondary to known joint disease in her knee. \par -referral given for physical therapy\par -f/u TSH \par \par #H pylori\par Has history of H pylori, s/p treatment\par -f/u breath test today to assess for eradication\par \par RTC in 6 weeks for follow up of the above

## 2021-10-25 ENCOUNTER — NON-APPOINTMENT (OUTPATIENT)
Age: 61
End: 2021-10-25

## 2021-10-25 LAB
BASOPHILS # BLD AUTO: 0.12 K/UL
BASOPHILS NFR BLD AUTO: 2.1 %
EOSINOPHIL # BLD AUTO: 0.34 K/UL
EOSINOPHIL NFR BLD AUTO: 6 %
HCT VFR BLD CALC: 40.2 %
HGB BLD-MCNC: 13 G/DL
IMM GRANULOCYTES NFR BLD AUTO: 0.2 %
LYMPHOCYTES # BLD AUTO: 2.53 K/UL
LYMPHOCYTES NFR BLD AUTO: 44.3 %
MAN DIFF?: NORMAL
MCHC RBC-ENTMCNC: 30.4 PG
MCHC RBC-ENTMCNC: 32.3 GM/DL
MCV RBC AUTO: 93.9 FL
MONOCYTES # BLD AUTO: 0.58 K/UL
MONOCYTES NFR BLD AUTO: 10.2 %
NEUTROPHILS # BLD AUTO: 2.13 K/UL
NEUTROPHILS NFR BLD AUTO: 37.2 %
PLATELET # BLD AUTO: 266 K/UL
RBC # BLD: 4.28 M/UL
RBC # FLD: 13.4 %
TSH SERPL-ACNC: 0.79 UIU/ML
UREA BREATH TEST QL: NEGATIVE
VIT B12 SERPL-MCNC: 578 PG/ML
WBC # FLD AUTO: 5.71 K/UL

## 2021-11-10 ENCOUNTER — RX RENEWAL (OUTPATIENT)
Age: 61
End: 2021-11-10

## 2021-11-16 ENCOUNTER — APPOINTMENT (OUTPATIENT)
Dept: ORTHOPEDIC SURGERY | Facility: CLINIC | Age: 61
End: 2021-11-16
Payer: COMMERCIAL

## 2021-11-16 VITALS — HEIGHT: 62 IN | WEIGHT: 183 LBS | BODY MASS INDEX: 33.68 KG/M2

## 2021-11-16 DIAGNOSIS — H26.9 UNSPECIFIED CATARACT: ICD-10-CM

## 2021-11-16 DIAGNOSIS — Z86.2 PERSONAL HISTORY OF DISEASES OF THE BLOOD AND BLOOD-FORMING ORGANS AND CERTAIN DISORDERS INVOLVING THE IMMUNE MECHANISM: ICD-10-CM

## 2021-11-16 DIAGNOSIS — H35.30 UNSPECIFIED MACULAR DEGENERATION: ICD-10-CM

## 2021-11-16 PROCEDURE — 99203 OFFICE O/P NEW LOW 30 MIN: CPT

## 2021-11-16 PROCEDURE — 73565 X-RAY EXAM OF KNEES: CPT

## 2021-11-16 NOTE — ASSESSMENT
[FreeTextEntry1] : 61-year-old woman left knee severe osteoarthritis medial and patellofemoral compartments and right knee moderate patellofemoral arthritis and mild medial compartment arthritis.\par We discussed the diagnosis and treatment.\par She was wondering if she needed surgery which I would not recommend at this time but at some point in the future she likely will need a knee replacement given her young age and relative severity of the arthritis particularly in the left knee.\par Right now she is working on physical therapy and was given home exercises to do.  She is going to try to lose some weight.  Heat and ice as needed.  Tylenol as needed for pain.  No NSAIDs given the history of ITP.\par I talked her about injections such as steroid injections or hyaluronic acid injections which we can consider and may be helpful.  She will follow-up in about 2 to 3 months to check her knees and if she is having ongoing significant pain then we may consider an injection at that time starting with a steroid.\par
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
## 2021-11-16 NOTE — PHYSICAL EXAM
[LE] : Sensory: Intact in bilateral lower extremities [Normal RLE] : Right Lower Extremity: No scars, rashes, lesions, ulcers, skin intact [Normal LLE] : Left Lower Extremity: No scars, rashes, lesions, ulcers, skin intact [Normal Touch] : sensation intact for touch [Normal] : Oriented to person, place, and time, insight and judgement were intact and the affect was normal [de-identified] : Knees\par Antalgic gait mildly.\par Tender medial joint line left greater than right knee.  Tender patella facets.\par Range of motion 0- 115 to 120 degrees left knee with pain in 125 degrees right knee with patellofemoral pain and crepitus.\par Negative Osbaldo.  Ia Lachman.normal varus and valgus laxity.\par Healed portal incisions left knee.\par Intact extensor mechanism. [de-identified] : \par X-rays bilateral knees weightbearing 4 views today showed severe productive degenerative changes medial and patellofemoral compartments of the right knee, not bone-on-bone. Mild narrowing medial right knee and moderate degenerative changes PF right knee.

## 2021-11-29 ENCOUNTER — APPOINTMENT (OUTPATIENT)
Dept: INTERNAL MEDICINE | Facility: CLINIC | Age: 61
End: 2021-11-29
Payer: COMMERCIAL

## 2021-11-29 VITALS
OXYGEN SATURATION: 99 % | TEMPERATURE: 98.9 F | SYSTOLIC BLOOD PRESSURE: 134 MMHG | DIASTOLIC BLOOD PRESSURE: 86 MMHG | HEART RATE: 71 BPM

## 2021-11-29 DIAGNOSIS — R29.6 REPEATED FALLS: ICD-10-CM

## 2021-11-29 DIAGNOSIS — K52.9 NONINFECTIVE GASTROENTERITIS AND COLITIS, UNSPECIFIED: ICD-10-CM

## 2021-11-29 DIAGNOSIS — R41.89 OTHER SYMPTOMS AND SIGNS INVOLVING COGNITIVE FUNCTIONS AND AWARENESS: ICD-10-CM

## 2021-11-29 PROCEDURE — 99214 OFFICE O/P EST MOD 30 MIN: CPT | Mod: GC

## 2021-11-30 ENCOUNTER — APPOINTMENT (OUTPATIENT)
Dept: INTERNAL MEDICINE | Facility: CLINIC | Age: 61
End: 2021-11-30

## 2021-11-30 ENCOUNTER — APPOINTMENT (OUTPATIENT)
Dept: ORTHOPEDIC SURGERY | Facility: CLINIC | Age: 61
End: 2021-11-30

## 2021-12-11 NOTE — ASSESSMENT
[FreeTextEntry1] : 61F pmh ITP, Depression, previous h pylori infection presents for follow up.\par \par #h pylori - repeat testing today\par \par #hypothyroid - c/w synthyroid 137 qd , TSH w reflex t4 today \par \par #depression - PHQ 2 negative, c/w sertaline 50 qd \par \par #vit d - c/w ergocalciferol \par \par #ITP - s/p rituximab 4x, follows w Dr Fulton , last plts reported to be 125\par \par #HCM \par - PAP: reports having apt \par - Mammo: negative 3/2021 \par - Cscope: negative in 2014 per pt \par - reports having HCV and HIV testing last year 2020 which was negative \par - has apt for flu shot \par \par RTC in 6 weeks for follow up, MO[facility] memory testing per pt request 
[[id number]]

## 2021-12-16 DIAGNOSIS — M21.619 BUNION OF UNSPECIFIED FOOT: ICD-10-CM

## 2021-12-17 ENCOUNTER — APPOINTMENT (OUTPATIENT)
Dept: ORTHOPEDIC SURGERY | Facility: CLINIC | Age: 61
End: 2021-12-17
Payer: COMMERCIAL

## 2021-12-17 VITALS — BODY MASS INDEX: 32.78 KG/M2 | WEIGHT: 185 LBS | RESPIRATION RATE: 16 BRPM | HEIGHT: 63 IN

## 2021-12-17 DIAGNOSIS — M79.672 PAIN IN LEFT FOOT: ICD-10-CM

## 2021-12-17 DIAGNOSIS — G89.29 PAIN IN RIGHT FOOT: ICD-10-CM

## 2021-12-17 DIAGNOSIS — M79.671 PAIN IN RIGHT FOOT: ICD-10-CM

## 2021-12-17 DIAGNOSIS — M24.274 DISORDER OF LIGAMENT, RIGHT FOOT: ICD-10-CM

## 2021-12-17 DIAGNOSIS — M20.11 HALLUX VALGUS (ACQUIRED), RIGHT FOOT: ICD-10-CM

## 2021-12-17 DIAGNOSIS — G89.29 PAIN IN LEFT FOOT: ICD-10-CM

## 2021-12-17 DIAGNOSIS — M24.271 DISORDER OF LIGAMENT, RIGHT ANKLE: ICD-10-CM

## 2021-12-17 DIAGNOSIS — M21.40 FLAT FOOT [PES PLANUS] (ACQUIRED), UNSPECIFIED FOOT: ICD-10-CM

## 2021-12-17 DIAGNOSIS — M20.12 HALLUX VALGUS (ACQUIRED), LEFT FOOT: ICD-10-CM

## 2021-12-17 DIAGNOSIS — M25.371 OTHER INSTABILITY, RIGHT ANKLE: ICD-10-CM

## 2021-12-17 PROCEDURE — 73630 X-RAY EXAM OF FOOT: CPT | Mod: LT,RT

## 2021-12-17 PROCEDURE — 99214 OFFICE O/P EST MOD 30 MIN: CPT

## 2021-12-17 RX ORDER — BISMUTH SUBSALICYLATE 262 MG/1
262 TABLET, CHEWABLE ORAL 4 TIMES DAILY
Qty: 120 | Refills: 0 | Status: DISCONTINUED | COMMUNITY
Start: 2021-09-13 | End: 2021-12-17

## 2021-12-17 RX ORDER — PANTOPRAZOLE 40 MG/1
40 TABLET, DELAYED RELEASE ORAL TWICE DAILY
Qty: 30 | Refills: 0 | Status: DISCONTINUED | COMMUNITY
Start: 2021-09-13 | End: 2021-12-17

## 2021-12-17 RX ORDER — EZETIMIBE 10 MG/1
10 TABLET ORAL
Refills: 0 | Status: DISCONTINUED | COMMUNITY
End: 2021-12-17

## 2021-12-20 ENCOUNTER — APPOINTMENT (OUTPATIENT)
Dept: INTERNAL MEDICINE | Facility: CLINIC | Age: 61
End: 2021-12-20

## 2021-12-20 PROBLEM — M25.371 INSTABILITY OF RIGHT ANKLE JOINT: Status: ACTIVE | Noted: 2021-12-17

## 2021-12-21 ENCOUNTER — APPOINTMENT (OUTPATIENT)
Dept: OPHTHALMOLOGY | Facility: CLINIC | Age: 61
End: 2021-12-21
Payer: COMMERCIAL

## 2021-12-21 ENCOUNTER — NON-APPOINTMENT (OUTPATIENT)
Age: 61
End: 2021-12-21

## 2021-12-21 PROCEDURE — 92004 COMPRE OPH EXAM NEW PT 1/>: CPT

## 2021-12-21 PROCEDURE — 92133 CPTRZD OPH DX IMG PST SGM ON: CPT

## 2021-12-21 PROCEDURE — 92020 GONIOSCOPY: CPT

## 2021-12-21 PROCEDURE — 76514 ECHO EXAM OF EYE THICKNESS: CPT

## 2021-12-23 ENCOUNTER — APPOINTMENT (OUTPATIENT)
Dept: INTERNAL MEDICINE | Facility: CLINIC | Age: 61
End: 2021-12-23

## 2022-01-20 NOTE — H&P ADULT - NSHPSOCIALHISTORY_GEN_ALL_CORE
Previous Accession (Optional): [id number] Previous Accession (Optional): [id number] Patient is , lives at home in the [place] at home  Works as a  in a Law office  Has children in [place], minimal other family  Denies etoh, tobacco, or drug use

## 2022-01-27 ENCOUNTER — APPOINTMENT (OUTPATIENT)
Dept: INTERNAL MEDICINE | Facility: CLINIC | Age: 62
End: 2022-01-27

## 2022-02-04 ENCOUNTER — APPOINTMENT (OUTPATIENT)
Dept: ORTHOPEDIC SURGERY | Facility: CLINIC | Age: 62
End: 2022-02-04

## 2022-02-14 ENCOUNTER — APPOINTMENT (OUTPATIENT)
Dept: ORTHOPEDIC SURGERY | Facility: CLINIC | Age: 62
End: 2022-02-14

## 2022-02-18 NOTE — ED ADULT NURSE NOTE - PAIN: PRESENCE, MLM
Patient doing well and stable. Continue post op drops as directed.  Continue to monitor. denies pain/discomfort

## 2022-03-15 ENCOUNTER — APPOINTMENT (OUTPATIENT)
Dept: INTERNAL MEDICINE | Facility: CLINIC | Age: 62
End: 2022-03-15
Payer: COMMERCIAL

## 2022-03-15 VITALS
HEART RATE: 83 BPM | TEMPERATURE: 98.2 F | OXYGEN SATURATION: 99 % | WEIGHT: 192 LBS | SYSTOLIC BLOOD PRESSURE: 118 MMHG | RESPIRATION RATE: 16 BRPM | BODY MASS INDEX: 34.01 KG/M2 | DIASTOLIC BLOOD PRESSURE: 73 MMHG

## 2022-03-15 DIAGNOSIS — Z00.00 ENCOUNTER FOR GENERAL ADULT MEDICAL EXAMINATION W/OUT ABNORMAL FINDINGS: ICD-10-CM

## 2022-03-15 PROCEDURE — 36415 COLL VENOUS BLD VENIPUNCTURE: CPT

## 2022-03-15 PROCEDURE — 99396 PREV VISIT EST AGE 40-64: CPT | Mod: GC,25

## 2022-03-17 LAB
CHOLEST SERPL-MCNC: 269 MG/DL
ESTIMATED AVERAGE GLUCOSE: 108 MG/DL
HBA1C MFR BLD HPLC: 5.4 %
HDLC SERPL-MCNC: 42 MG/DL
LDLC SERPL CALC-MCNC: 169 MG/DL
NONHDLC SERPL-MCNC: 227 MG/DL
TRIGL SERPL-MCNC: 286 MG/DL
TSH SERPL-ACNC: 0.7 UIU/ML

## 2022-03-18 ENCOUNTER — APPOINTMENT (OUTPATIENT)
Dept: NEUROLOGY | Facility: CLINIC | Age: 62
End: 2022-03-18

## 2022-03-28 ENCOUNTER — APPOINTMENT (OUTPATIENT)
Dept: NEUROLOGY | Facility: CLINIC | Age: 62
End: 2022-03-28

## 2022-03-31 DIAGNOSIS — Z12.31 ENCOUNTER FOR SCREENING MAMMOGRAM FOR MALIGNANT NEOPLASM OF BREAST: ICD-10-CM

## 2022-05-24 ENCOUNTER — APPOINTMENT (OUTPATIENT)
Dept: INTERNAL MEDICINE | Facility: CLINIC | Age: 62
End: 2022-05-24
Payer: COMMERCIAL

## 2022-05-24 ENCOUNTER — LABORATORY RESULT (OUTPATIENT)
Age: 62
End: 2022-05-24

## 2022-05-24 VITALS
HEIGHT: 63 IN | RESPIRATION RATE: 16 BRPM | OXYGEN SATURATION: 96 % | DIASTOLIC BLOOD PRESSURE: 87 MMHG | SYSTOLIC BLOOD PRESSURE: 121 MMHG | HEART RATE: 84 BPM | TEMPERATURE: 98.5 F

## 2022-05-24 DIAGNOSIS — U07.1 COVID-19: ICD-10-CM

## 2022-05-24 PROCEDURE — 99214 OFFICE O/P EST MOD 30 MIN: CPT | Mod: 25,GC

## 2022-05-24 PROCEDURE — 36415 COLL VENOUS BLD VENIPUNCTURE: CPT

## 2022-05-24 NOTE — HISTORY OF PRESENT ILLNESS
[de-identified] : 61 year old F with PMH: ITP, H pylori (treated), HLD, hypothyroidism presents to the clinic for covid f/u. Pt is boosted w Pfizer and had sx starting 8 days ago including sore throat, fatigue, myalgias only. Tested + on PCR only 7 days ago and has had 3 neg [place]. sx have resolved and now consist of phlegm cough and post nasal drip. no other complaints at this time. Pt would like CBC for ITP routine surveillance.

## 2022-05-24 NOTE — PLAN
[FreeTextEntry1] : 61 year old F with PMH: ITP, H pylori (treated), HLD, hypothyroidism presents to the clinic for covid f/u\par \par #Covid-19\par Counselled patient that sx are likely post viral in nature and to RTC if do not resolved within 6-8 weeks \par Pt would like repeat PCR today \par Wear well fitting mask for another 2 days per CDC guidelines\par \par #ITP \par -f/u repeat CBC \par -informed plt count could be affected by viral syndrome\par -no signs active bleeding

## 2022-05-25 ENCOUNTER — TRANSCRIPTION ENCOUNTER (OUTPATIENT)
Age: 62
End: 2022-05-25

## 2022-05-26 ENCOUNTER — TRANSCRIPTION ENCOUNTER (OUTPATIENT)
Age: 62
End: 2022-05-26

## 2022-05-26 ENCOUNTER — NON-APPOINTMENT (OUTPATIENT)
Age: 62
End: 2022-05-26

## 2022-05-26 LAB
BASOPHILS # BLD AUTO: 0.07 K/UL
BASOPHILS NFR BLD AUTO: 1.5 %
EOSINOPHIL # BLD AUTO: 0.23 K/UL
EOSINOPHIL NFR BLD AUTO: 4.8 %
HCT VFR BLD CALC: 48.3 %
HGB BLD-MCNC: 14.8 G/DL
IMM GRANULOCYTES NFR BLD AUTO: 0.4 %
LYMPHOCYTES # BLD AUTO: 1.81 K/UL
LYMPHOCYTES NFR BLD AUTO: 38 %
MAN DIFF?: NORMAL
MCHC RBC-ENTMCNC: 30.6 GM/DL
MCHC RBC-ENTMCNC: 30.9 PG
MCV RBC AUTO: 100.8 FL
MONOCYTES # BLD AUTO: 0.47 K/UL
MONOCYTES NFR BLD AUTO: 9.9 %
NEUTROPHILS # BLD AUTO: 2.16 K/UL
NEUTROPHILS NFR BLD AUTO: 45.4 %
PLATELET # BLD AUTO: 280 K/UL
RBC # BLD: 4.79 M/UL
RBC # FLD: 13 %
SARS-COV-2 N GENE NPH QL NAA+PROBE: DETECTED
WBC # FLD AUTO: 4.76 K/UL

## 2022-06-21 ENCOUNTER — NON-APPOINTMENT (OUTPATIENT)
Age: 62
End: 2022-06-21

## 2022-06-21 ENCOUNTER — APPOINTMENT (OUTPATIENT)
Dept: OPHTHALMOLOGY | Facility: CLINIC | Age: 62
End: 2022-06-21
Payer: COMMERCIAL

## 2022-06-21 PROCEDURE — 92014 COMPRE OPH EXAM EST PT 1/>: CPT

## 2022-06-21 PROCEDURE — 92133 CPTRZD OPH DX IMG PST SGM ON: CPT

## 2022-06-23 ENCOUNTER — RX RENEWAL (OUTPATIENT)
Age: 62
End: 2022-06-23

## 2022-06-27 ENCOUNTER — RX RENEWAL (OUTPATIENT)
Age: 62
End: 2022-06-27

## 2022-09-09 ENCOUNTER — RX RENEWAL (OUTPATIENT)
Age: 62
End: 2022-09-09

## 2022-09-19 ENCOUNTER — APPOINTMENT (OUTPATIENT)
Dept: INTERNAL MEDICINE | Facility: CLINIC | Age: 62
End: 2022-09-19

## 2022-09-19 ENCOUNTER — LABORATORY RESULT (OUTPATIENT)
Age: 62
End: 2022-09-19

## 2022-09-19 VITALS
HEIGHT: 63 IN | TEMPERATURE: 98 F | RESPIRATION RATE: 14 BRPM | DIASTOLIC BLOOD PRESSURE: 78 MMHG | OXYGEN SATURATION: 96 % | HEART RATE: 80 BPM | SYSTOLIC BLOOD PRESSURE: 106 MMHG

## 2022-09-19 DIAGNOSIS — R41.3 OTHER AMNESIA: ICD-10-CM

## 2022-09-19 PROCEDURE — 36415 COLL VENOUS BLD VENIPUNCTURE: CPT

## 2022-09-19 PROCEDURE — 99213 OFFICE O/P EST LOW 20 MIN: CPT | Mod: 25,GC

## 2022-09-20 LAB
TSH SERPL-ACNC: 0.02 UIU/ML
VIT B12 SERPL-MCNC: 511 PG/ML

## 2022-09-20 NOTE — ED PROVIDER NOTE - CARE PLAN

## 2022-10-13 ENCOUNTER — APPOINTMENT (OUTPATIENT)
Dept: ORTHOPEDIC SURGERY | Facility: CLINIC | Age: 62
End: 2022-10-13

## 2022-10-13 PROCEDURE — 73564 X-RAY EXAM KNEE 4 OR MORE: CPT | Mod: LT

## 2022-10-13 PROCEDURE — 99214 OFFICE O/P EST MOD 30 MIN: CPT

## 2022-10-13 NOTE — ASSESSMENT
[FreeTextEntry1] : 62-year-old woman left knee severe osteoarthritis medial and patellofemoral compartments and right knee moderate patellofemoral arthritis and mild medial compartment arthritis.\par She fell hitting her left knee a few days ago.  This on the exam I do not suspect any fracture and no fractures seen on x-rays.\par I suggested using some heat and ice and doing straight leg raises and gentle exercises for the injury but also in general for her arthritis.\par Heat and ice as needed.\par Tylenol or ibuprofen if needed.\par Follow-up in about a month unless her knee is doing better.  We talked about possible corticosteroid and hyaluronic acid injections for the arthritis.\par Given the severity of the arthritis and I think Knee replacement will be something she considers and is indicated at some point in her future.  I discussed with her that knee replacement is done when the arthritis is typically severe and also the quality of life is significantly compromised and function is decreased due to the arthritis.

## 2022-10-13 NOTE — PHYSICAL EXAM
[LE] : Sensory: Intact in bilateral lower extremities [Normal RLE] : Right Lower Extremity: No scars, rashes, lesions, ulcers, skin intact [Normal LLE] : Left Lower Extremity: No scars, rashes, lesions, ulcers, skin intact [Normal Touch] : sensation intact for touch [Normal] : Oriented to person, place, and time, insight and judgement were intact and the affect was normal [Slightly Antalgic] : slightly antalgic [de-identified] : Knees\par Antalgic gait \par Tender medial joint line left greater than right knee.  Tender patella facets.  No severe tenderness patella or elsewhere\par Range of motion 0- 120 degrees left knee with pain in 125 degrees right knee with patellofemoral pain and crepitus.\par Negative Osbaldo.  Ia Lachman.normal varus and valgus laxity.\par Healed portal incisions left knee.\par Intact extensor mechanism.\par Normal neurovascular exam distally. [de-identified] : \par \par X-rays taken today compared to 11/16/21 of bilateral knees WB 4 views showed severe productive changes medial and patellofemoral compartments of the LEFT knee, with some progression compared to last year and greater severity.. Mild narrowing medial RIGHT knee and moderate degenerative changes PF RIGHT knee

## 2022-10-13 NOTE — HISTORY OF PRESENT ILLNESS
[de-identified] : Ms. Sawyer is a 63 y/o woman who comes in for follow up for bilateral knee osteoarthritis and pain.\par She recently fell on the street and her LEFT knee is swelling and clicking.  Her knees were doing okay until a few days ago when she fell on her left knee.  She was last seen almost a year ago.  She did physical therapy.  She tends to not always focus and sometimes trips and falls.  No syncope.  She had pain and swelling and her knee felt tight.  She took Motrin which helped centimeters lidocaine patch.  Her knee has some stiffness and crunching and clicking.\par She is walking without assistive devices.  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

## 2022-10-25 PROBLEM — M17.0 PRIMARY OSTEOARTHRITIS OF BOTH KNEES: Status: ACTIVE | Noted: 2021-11-16

## 2022-10-25 PROBLEM — S80.02XA CONTUSION OF LEFT KNEE, INITIAL ENCOUNTER: Status: ACTIVE | Noted: 2022-10-13

## 2022-10-26 ENCOUNTER — APPOINTMENT (OUTPATIENT)
Dept: ORTHOPEDIC SURGERY | Facility: CLINIC | Age: 62
End: 2022-10-26

## 2022-10-26 DIAGNOSIS — S80.02XA CONTUSION OF LEFT KNEE, INITIAL ENCOUNTER: ICD-10-CM

## 2022-10-26 DIAGNOSIS — M25.562 PAIN IN RIGHT KNEE: ICD-10-CM

## 2022-10-26 DIAGNOSIS — M25.561 PAIN IN RIGHT KNEE: ICD-10-CM

## 2022-10-26 DIAGNOSIS — M17.0 BILATERAL PRIMARY OSTEOARTHRITIS OF KNEE: ICD-10-CM

## 2022-10-26 PROCEDURE — 99214 OFFICE O/P EST MOD 30 MIN: CPT

## 2022-10-26 NOTE — HISTORY OF PRESENT ILLNESS
[de-identified] : Ms. Sawyer is a 63 y/o woman who comes in for follow up for bilateral knee osteoarthritis and pain aggravated when she fell on 10/17/22 on her LEFT knee.  This fall was because she was walking down stairs at her daughter's house and missed the last step and fell flexing her knee and falling on the knee.  It did not swell or bruise.  The pain is not severe or as bad as when she fell in the past.  She has been walking without assistive devices.  She takes occasional Aleve.\par She wanted to come in today to document that she had fallen.

## 2022-10-26 NOTE — PHYSICAL EXAM
[LE] : Sensory: Intact in bilateral lower extremities [Normal RLE] : Right Lower Extremity: No scars, rashes, lesions, ulcers, skin intact [Normal LLE] : Left Lower Extremity: No scars, rashes, lesions, ulcers, skin intact [Normal Touch] : sensation intact for touch [Normal] : Oriented to person, place, and time, insight and judgement were intact and the affect was normal [Slightly Antalgic] : slightly antalgic [de-identified] : Knees\par Left knee is larger than the right without any significant edema or effusion.\par Tender medial joint line left greater than right knee.  Tender patella facets.  No severe tenderness patella or elsewhere\par Range of motion 0- 120 degrees left knee with pain and 0- 125 degrees right knee with patellofemoral pain and crepitus.\par Negative Osbaldo.  Ia Lachman.normal varus and valgus laxity.\par Healed portal incisions left knee.\par Intact extensor mechanism/straight leg raise\par Normal neurovascular exam distally. [de-identified] : \par \par X-rays taken today compared to 11/16/21 of bilateral knees WB 4 views showed severe productive changes medial and patellofemoral compartments of the LEFT knee, with some progression compared to last year and greater severity. Mild narrowing medial RIGHT knee and moderate degenerative changes PF RIGHT knee

## 2022-10-26 NOTE — ASSESSMENT
[FreeTextEntry1] : 62-year-old woman left knee severe osteoarthritis medial and patellofemoral compartments and right knee moderate patellofemoral arthritis and mild medial compartment arthritis.\par She has had several falls on her knees.  The last 1 it sounds like she just missed the last step.  She wears glasses which could affect her visibility.  She may want to talk to her medical doctor or see a neurologist or cardiologist.  She also is having her ears checked.  She should work on strengthening and balance.\par She has never had any injections in the knee for the arthritis.  I did offer that today but she would prefer not to have an injection.\par She can take Tylenol or I gave her a prescription for Celebrex which is easier on her stomach.  She is on sertraline which can increase the risk of GI effects from other NSAIDs such as Naprosyn which she took in the past.\par Heat and ice as needed.\par Physical therapy.\par Follow-up if pain is not improving or as needed.

## 2022-12-06 ENCOUNTER — APPOINTMENT (OUTPATIENT)
Dept: OPHTHALMOLOGY | Facility: CLINIC | Age: 62
End: 2022-12-06

## 2022-12-08 ENCOUNTER — APPOINTMENT (OUTPATIENT)
Dept: INTERNAL MEDICINE | Facility: CLINIC | Age: 62
End: 2022-12-08

## 2022-12-08 VITALS
SYSTOLIC BLOOD PRESSURE: 124 MMHG | OXYGEN SATURATION: 95 % | HEIGHT: 63 IN | TEMPERATURE: 97.5 F | WEIGHT: 180 LBS | BODY MASS INDEX: 31.89 KG/M2 | HEART RATE: 90 BPM | DIASTOLIC BLOOD PRESSURE: 83 MMHG

## 2022-12-08 DIAGNOSIS — H10.30 UNSPECIFIED ACUTE CONJUNCTIVITIS, UNSPECIFIED EYE: ICD-10-CM

## 2022-12-08 DIAGNOSIS — J06.9 ACUTE UPPER RESPIRATORY INFECTION, UNSPECIFIED: ICD-10-CM

## 2022-12-08 PROCEDURE — 99214 OFFICE O/P EST MOD 30 MIN: CPT | Mod: GC

## 2022-12-09 PROBLEM — J06.9 VIRAL URI WITH COUGH: Status: RESOLVED | Noted: 2022-12-09 | Resolved: 2023-01-08

## 2022-12-09 NOTE — END OF VISIT
[FreeTextEntry3] : I saw and evaluated the patient.  The findings and assessment were discussed with the resident and I agree with resident’s plan as documented in the above, in the resident’s note.\par \par Pertinent exam findings: Well-appearing, NAD. \par \par B/l conjunctivitis worse on R. >5 days in duration.  No improvement w/ discharge - Erythomycin ointment.\par Viral URI - conservative therapy.

## 2022-12-09 NOTE — PHYSICAL EXAM
[Normal] : affect was normal and insight and judgment were intact [de-identified] : bilateral conjunctival injection

## 2022-12-09 NOTE — HISTORY OF PRESENT ILLNESS
[FreeTextEntry1] : pt states she has been experiencing a sore throat and cough,redness in both eyes [de-identified] : 62 yoF with a PMHx of ITP, HLD, hypothyroidism presents for pink eye and sore throat. Pt reports she began to feel sick on Friday 12/2 with post-nasal drip and sore throat. Her symptoms progressed to dry cough with some muscle aches in the low back. Additionally, patient c/o bilateral redness of her eyes with associated yellow discharge. Denies fever, mucus production, chills, eye itching, eye pain.

## 2022-12-09 NOTE — REVIEW OF SYSTEMS
[Chills] : chills [Discharge] : discharge [Redness] : redness [Sore Throat] : sore throat [Postnasal Drip] : postnasal drip [Negative] : Musculoskeletal [Fever] : no fever [Fatigue] : no fatigue [Pain] : no pain [Dryness] : no dryness  [Vision Problems] : no vision problems [Itching] : no itching [Earache] : no earache [Nosebleed] : no nosebleeds [Nasal Discharge] : no nasal discharge

## 2022-12-09 NOTE — ASSESSMENT
[FreeTextEntry1] : 62 yoF with a PMHx of ITP, HLD, hypothyroidism presents for pink eye and sore throat.\par \par # Bilateral conjunctivitis - viral vs bacterial \par Pt reports eye redness x 1 week with associated yellow discharge. No eye itching or eye pain. \par - Ofloxacin eye gtts (patient w allergy to erythromycin) x 7 days\par \par # Sore throat\par Likely 2/2 acute viral illness. Patient without fevers, no productive cough. Symptoms have been improving over the past week. \par - continue with symptomatic management - tylenol prn body aches, encouraged staying well hydrated

## 2022-12-12 ENCOUNTER — RX RENEWAL (OUTPATIENT)
Age: 62
End: 2022-12-12

## 2022-12-12 ENCOUNTER — NON-APPOINTMENT (OUTPATIENT)
Age: 62
End: 2022-12-12

## 2022-12-12 RX ORDER — SERTRALINE HYDROCHLORIDE 50 MG/1
50 TABLET, FILM COATED ORAL
Qty: 90 | Refills: 0 | Status: ACTIVE | COMMUNITY
Start: 2021-05-14 | End: 1900-01-01

## 2022-12-13 RX ORDER — ERYTHROMYCIN 5 MG/G
5 OINTMENT OPHTHALMIC
Qty: 1 | Refills: 0 | Status: COMPLETED | COMMUNITY
Start: 2022-12-08 | End: 2022-12-13

## 2022-12-23 ENCOUNTER — RX RENEWAL (OUTPATIENT)
Age: 62
End: 2022-12-23

## 2023-01-20 ENCOUNTER — APPOINTMENT (OUTPATIENT)
Dept: INTERNAL MEDICINE | Facility: CLINIC | Age: 63
End: 2023-01-20
Payer: COMMERCIAL

## 2023-01-20 VITALS
WEIGHT: 178 LBS | TEMPERATURE: 95 F | HEIGHT: 62 IN | OXYGEN SATURATION: 97 % | SYSTOLIC BLOOD PRESSURE: 114 MMHG | HEART RATE: 94 BPM | BODY MASS INDEX: 32.76 KG/M2 | DIASTOLIC BLOOD PRESSURE: 73 MMHG

## 2023-01-20 DIAGNOSIS — E78.5 HYPERLIPIDEMIA, UNSPECIFIED: ICD-10-CM

## 2023-01-20 DIAGNOSIS — E03.9 HYPOTHYROIDISM, UNSPECIFIED: ICD-10-CM

## 2023-01-20 PROCEDURE — 36415 COLL VENOUS BLD VENIPUNCTURE: CPT

## 2023-01-20 PROCEDURE — 99214 OFFICE O/P EST MOD 30 MIN: CPT | Mod: 25

## 2023-01-23 ENCOUNTER — NON-APPOINTMENT (OUTPATIENT)
Age: 63
End: 2023-01-23

## 2023-01-24 ENCOUNTER — TRANSCRIPTION ENCOUNTER (OUTPATIENT)
Age: 63
End: 2023-01-24

## 2023-01-30 LAB — TSH SERPL-ACNC: 0.49 UIU/ML

## 2023-02-02 ENCOUNTER — NON-APPOINTMENT (OUTPATIENT)
Age: 63
End: 2023-02-02

## 2023-02-02 RX ORDER — LEVOTHYROXINE SODIUM 0.12 MG/1
125 TABLET ORAL DAILY
Qty: 30 | Refills: 3 | Status: ACTIVE | COMMUNITY
Start: 2021-08-26 | End: 1900-01-01

## 2023-03-10 ENCOUNTER — APPOINTMENT (OUTPATIENT)
Dept: INTERNAL MEDICINE | Facility: CLINIC | Age: 63
End: 2023-03-10

## 2023-03-27 ENCOUNTER — APPOINTMENT (OUTPATIENT)
Dept: ULTRASOUND IMAGING | Facility: HOSPITAL | Age: 63
End: 2023-03-27

## 2023-05-01 ENCOUNTER — NON-APPOINTMENT (OUTPATIENT)
Age: 63
End: 2023-05-01

## 2023-05-01 ENCOUNTER — APPOINTMENT (OUTPATIENT)
Dept: OPHTHALMOLOGY | Facility: CLINIC | Age: 63
End: 2023-05-01
Payer: COMMERCIAL

## 2023-05-01 PROCEDURE — 92014 COMPRE OPH EXAM EST PT 1/>: CPT

## 2023-05-01 PROCEDURE — 92133 CPTRZD OPH DX IMG PST SGM ON: CPT

## 2023-05-24 NOTE — PATIENT PROFILE ADULT - ARRIVAL FROM
Abdomen , soft, nontender, nondistended , no guarding or rigidity , no masses palpable , normal bowel sounds , Liver and Spleen , no hepatomegaly present , no hepatosplenomegaly , liver nontender , spleen not palpable
Home

## 2023-06-06 ENCOUNTER — APPOINTMENT (OUTPATIENT)
Dept: INTERNAL MEDICINE | Facility: CLINIC | Age: 63
End: 2023-06-06

## 2023-06-19 ENCOUNTER — APPOINTMENT (OUTPATIENT)
Dept: NEUROLOGY | Facility: CLINIC | Age: 63
End: 2023-06-19

## 2023-06-21 ENCOUNTER — APPOINTMENT (OUTPATIENT)
Dept: VASCULAR SURGERY | Facility: CLINIC | Age: 63
End: 2023-06-21
Payer: COMMERCIAL

## 2023-07-26 ENCOUNTER — NON-APPOINTMENT (OUTPATIENT)
Age: 63
End: 2023-07-26

## 2023-07-26 ENCOUNTER — APPOINTMENT (OUTPATIENT)
Dept: VASCULAR SURGERY | Facility: CLINIC | Age: 63
End: 2023-07-26
Payer: COMMERCIAL

## 2023-07-26 VITALS
BODY MASS INDEX: 32.78 KG/M2 | SYSTOLIC BLOOD PRESSURE: 128 MMHG | WEIGHT: 185 LBS | HEART RATE: 75 BPM | HEIGHT: 63 IN | DIASTOLIC BLOOD PRESSURE: 80 MMHG

## 2023-07-26 DIAGNOSIS — I83.93 ASYMPTOMATIC VARICOSE VEINS OF BILATERAL LOWER EXTREMITIES: ICD-10-CM

## 2023-07-26 DIAGNOSIS — Z87.891 PERSONAL HISTORY OF NICOTINE DEPENDENCE: ICD-10-CM

## 2023-07-26 DIAGNOSIS — I78.1 NEVUS, NON-NEOPLASTIC: ICD-10-CM

## 2023-07-26 PROCEDURE — 99203 OFFICE O/P NEW LOW 30 MIN: CPT

## 2023-07-26 PROCEDURE — 93970 EXTREMITY STUDY: CPT

## 2023-07-26 RX ORDER — LEVOTHYROXINE SODIUM 150 UG/1
150 TABLET ORAL
Refills: 0 | Status: DISCONTINUED | COMMUNITY
End: 2023-07-26

## 2023-07-26 RX ORDER — TETRACYCLINE HYDROCHLORIDE 500 MG/1
500 CAPSULE ORAL EVERY 6 HOURS
Qty: 60 | Refills: 0 | Status: DISCONTINUED | COMMUNITY
Start: 2021-09-13 | End: 2023-07-26

## 2023-07-26 RX ORDER — CLARITHROMYCIN 500 MG/1
500 TABLET, FILM COATED ORAL
Refills: 0 | Status: DISCONTINUED | COMMUNITY
End: 2023-07-26

## 2023-07-26 RX ORDER — CELECOXIB 100 MG/1
100 CAPSULE ORAL
Qty: 90 | Refills: 0 | Status: DISCONTINUED | COMMUNITY
Start: 2022-10-26 | End: 2023-07-26

## 2023-07-26 RX ORDER — OFLOXACIN 3 MG/ML
0.3 SOLUTION/ DROPS OPHTHALMIC
Qty: 1 | Refills: 0 | Status: DISCONTINUED | COMMUNITY
Start: 2022-12-08 | End: 2023-07-26

## 2023-07-26 RX ORDER — NAPROXEN 500 MG/1
500 TABLET, DELAYED RELEASE ORAL DAILY
Qty: 30 | Refills: 0 | Status: DISCONTINUED | COMMUNITY
Start: 2022-12-05 | End: 2023-07-26

## 2023-07-26 RX ORDER — METRONIDAZOLE 250 MG/1
250 TABLET ORAL EVERY 6 HOURS
Qty: 60 | Refills: 0 | Status: DISCONTINUED | COMMUNITY
Start: 2021-09-13 | End: 2023-07-26

## 2023-07-27 PROBLEM — I83.93 ASYMPTOMATIC VARICOSE VEINS OF BOTH LOWER EXTREMITIES: Status: ACTIVE | Noted: 2023-07-27

## 2023-07-27 PROBLEM — Z87.891 FORMER SMOKER: Status: ACTIVE | Noted: 2021-06-30

## 2023-07-27 PROBLEM — I78.1 ASYMPTOMATIC SPIDER VEINS OF BOTH LOWER EXTREMITIES: Status: ACTIVE | Noted: 2023-07-27

## 2023-07-27 RX ORDER — MAGNESIUM OXIDE/MAG AA CHELATE 300 MG
CAPSULE ORAL
Refills: 0 | Status: ACTIVE | COMMUNITY

## 2023-08-09 NOTE — CONSULT LETTER
[Dear  ___] : Dear ~LIZA, [FreeTextEntry2] : Mehnaz Wise,  [address] [place] [address] [FreeTextEntry1] : I saw Ms Sarah Sawyer in the office. She reports a long-standing history of varicose veins. She denies any localized pain over the veins but only reports some generalized itchiness and leg heaviness. She denies previous vein procedures, venous thrombosis or wounds. She wears compression stockings but not on a daily basis. She sits for prolonged periods of time at work.   On exam, legs are well perfused. Numerous mildly bulging venous varices and spider veins are present in both legs. Skin intact. No edema.   Venous duplex showed no evidence of thrombosis or reflux bilaterally.   Ms Smith has relatively asymptomatic varicose and spider veins. No surgical intervention is recommended at this time. Activity is encouraged and we also discussed benefits of weight loss which may alleviate some of the leg symptoms. She may compressions stockings and should keep her skin well moisturized. She may see me as needed.   My complete EMR office note is below for your records.  [FreeTextEntry3] : Sincerely,   Aidan Vaca M.D.  , Surgical Services [facility] Surgeon-in-Chief, [facility] Professor of Surgery, [employer] at [facility]

## 2023-08-09 NOTE — ADDENDUM
[FreeTextEntry1] : I, Dr. Aidan Vaca, personally performed the evaluation and management (E/M) services for this new patient.  That E/M includes conducting the initial examination, assessing all conditions, and establishing the plan of care.  Today, my ACP, Becca Marquez NP, was here to observe my evaluation and management services for this patient to be followed going forward. \par \par The documentation for this encounter was entered by Marimar Castellanos acting as a scribe for Dr. Aidan Vaca.

## 2023-08-09 NOTE — PHYSICAL EXAM
[Varicose Veins Of Lower Extremities] : bilaterally [Ankle Swelling On The Left] : moderate [2+] : left 2+ [Ankle Swelling (On Exam)] : not present [] : not present [de-identified] : WN/WD [FreeTextEntry1] : Numerous varicose veins on both legs from ankles to upper thighs, some bulging but majority superficial and spider veins.  Skin intact, warm to touch.   [de-identified] : overweight [de-identified] : FROM

## 2023-08-09 NOTE — ASSESSMENT
[FreeTextEntry1] : 64 y/o F with bilateral LEs varicose and spider veins. On exam, numerous varicose veins on both legs from ankles to upper thighs, some bulging but majority superficial and spider veins.  Skin intact, warm to touch.   Venous duplex showed no DVT b/l. No GSV/ SSV reflux.  Pt was encouraged to maintain an active lifestyle and walk daily. No intervention is recommended for the varicose and spider veins. Deep vein systems seem in good condition. Pt may use compression socks as needed. Weight loss discussed as it may alleviate some of the leg symptoms too. She should keep skin well moisturized. F/u prn.

## 2023-08-09 NOTE — PROCEDURE
[FreeTextEntry1] : Venous duplex ordered to r/o insuff and eval vv, shows: no DVT b/l. No GSV/ SSV reflux.

## 2023-08-09 NOTE — HISTORY OF PRESENT ILLNESS
[FreeTextEntry1] : 64 y/o F referred by REY Wise. She has a PMHx of ITP, HLD, hypothyroidism, cataracts, depression, osteoarthritis, knee arthroscopy in 2010 and 2012, and presents for evaluation of varicose veins. She reports she has had the veins for many years and denies any previous procedures. She had a vein one time that bleed on the left leg, but she applied pressure and it resolved. The veins itch and she reports the legs often feel heavy. Denies any pain localized over the veins. She has worn compression stockings but does not wear them daily. She sit a lot for work. Denies any wounds, leg swelling, fever/chills, signs of phlebitis, SOB/CP. She had 3 pregnancies total.   FMHx:  - Mother: hx of brain aneurysm.   - Father: passed away from COVID - Family history of varicose veins.    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

## 2023-11-20 ENCOUNTER — APPOINTMENT (OUTPATIENT)
Dept: OPHTHALMOLOGY | Facility: CLINIC | Age: 63
End: 2023-11-20

## 2023-11-28 ENCOUNTER — APPOINTMENT (OUTPATIENT)
Dept: ENDOCRINOLOGY | Facility: CLINIC | Age: 63
End: 2023-11-28

## 2024-05-13 NOTE — ASSESSMENT
[FreeTextEntry1] : 64-year-old woman left knee severe osteoarthritis medial and patellofemoral compartments and right knee moderate patellofemoral arthritis and mild medial compartment arthritis. She has had several falls on her knees.

## 2024-05-13 NOTE — PHYSICAL EXAM
[Slightly Antalgic] : slightly antalgic [LE] : Sensory: Intact in bilateral lower extremities [Normal RLE] : Right Lower Extremity: No scars, rashes, lesions, ulcers, skin intact [Normal LLE] : Left Lower Extremity: No scars, rashes, lesions, ulcers, skin intact [Normal Touch] : sensation intact for touch [Normal] : Oriented to person, place, and time, insight and judgement were intact and the affect was normal [de-identified] : Knees\par  Left knee is larger than the right without any significant edema or effusion.\par  Tender medial joint line left greater than right knee.  Tender patella facets.  No severe tenderness patella or elsewhere\par  Range of motion 0- 120 degrees left knee with pain and 0- 125 degrees right knee with patellofemoral pain and crepitus.\par  Negative Osbaldo.  Ia Lachman.normal varus and valgus laxity.\par  Healed portal incisions left knee.\par  Intact extensor mechanism/straight leg raise\par  Normal neurovascular exam distally. [de-identified] :   X-rays taken today for knee osteoarthritis follow-up compared to x-rays 2022 of bilateral knees WB 4 views showed severe productive changes medial and patellofemoral compartments of the LEFT knee, with some progression compared to last year and greater severity.. Mild narrowing medial RIGHT knee and moderate degenerative changes PF RIGHT knee

## 2024-05-13 NOTE — HISTORY OF PRESENT ILLNESS
[de-identified] : Ms. Sawyer is a 65 y/o woman who comes in for follow up for bilateral knee osteoarthritis She was last seen a couple years ago for this. Since then

## 2024-05-17 ENCOUNTER — APPOINTMENT (OUTPATIENT)
Dept: ORTHOPEDIC SURGERY | Facility: CLINIC | Age: 64
End: 2024-05-17

## 2024-08-12 ENCOUNTER — APPOINTMENT (OUTPATIENT)
Dept: UROLOGY | Facility: CLINIC | Age: 64
End: 2024-08-12

## 2024-11-26 NOTE — ED ADULT NURSE NOTE - ED CARDIAC RHYTHM
REFILL  MEDICATION:     Bupropion  MG; Take 1 tablet daily.     Citalopram 40 MG; Take 1 tablet daily.     PHARM: [facility]   PHARM NUMBER: [phone]    LR: 1/30/24     90 tablets with 1 refill   LV: 7/26/24  NV: 8/6/25  
Refilled.   
regular